# Patient Record
Sex: FEMALE | Race: WHITE | Employment: OTHER | URBAN - METROPOLITAN AREA
[De-identification: names, ages, dates, MRNs, and addresses within clinical notes are randomized per-mention and may not be internally consistent; named-entity substitution may affect disease eponyms.]

---

## 2017-05-12 ENCOUNTER — HOSPITAL ENCOUNTER (OUTPATIENT)
Dept: MAMMOGRAPHY | Age: 67
Discharge: HOME | End: 2017-05-12
Attending: FAMILY MEDICINE
Payer: MEDICARE

## 2017-05-12 DIAGNOSIS — Z12.31 VISIT FOR SCREENING MAMMOGRAM: ICD-10-CM

## 2017-05-12 PROCEDURE — 77067 SCR MAMMO BI INCL CAD: CPT

## 2017-08-04 ENCOUNTER — HOSPITAL ENCOUNTER (OUTPATIENT)
Dept: MAMMOGRAPHY | Age: 67
Discharge: HOME | End: 2017-08-04
Attending: FAMILY MEDICINE
Payer: MEDICARE

## 2017-08-04 DIAGNOSIS — Z13.820 ENCOUNTER FOR SCREENING FOR OSTEOPOROSIS: ICD-10-CM

## 2017-08-04 DIAGNOSIS — Z78.0 POST-MENOPAUSE: ICD-10-CM

## 2017-08-04 PROCEDURE — 77080 DXA BONE DENSITY AXIAL: CPT

## 2018-08-20 ENCOUNTER — HOSPITAL ENCOUNTER (OUTPATIENT)
Dept: ULTRASOUND IMAGING | Age: 68
Discharge: HOME | End: 2018-08-20
Attending: INTERNAL MEDICINE
Payer: MEDICARE

## 2018-08-20 ENCOUNTER — HOSPITAL ENCOUNTER (OUTPATIENT)
Dept: GENERAL RADIOLOGY | Age: 68
Discharge: HOME | End: 2018-08-20
Attending: INTERNAL MEDICINE
Payer: MEDICARE

## 2018-08-20 ENCOUNTER — HOSPITAL ENCOUNTER (OUTPATIENT)
Dept: NUCLEAR MEDICINE | Age: 68
End: 2018-08-20
Attending: INTERNAL MEDICINE
Payer: MEDICARE

## 2018-08-20 DIAGNOSIS — K66.0 ABDOMINAL ADHESIONS: ICD-10-CM

## 2018-08-20 DIAGNOSIS — G89.4 CHRONIC PAIN SYNDROME: ICD-10-CM

## 2018-08-20 DIAGNOSIS — K80.20 GALLSTONE: ICD-10-CM

## 2018-08-20 DIAGNOSIS — B18.2 HEP C W/O COMA, CHRONIC (HCC): ICD-10-CM

## 2018-08-20 PROCEDURE — 76705 ECHO EXAM OF ABDOMEN: CPT

## 2018-08-21 ENCOUNTER — ANESTHESIA EVENT (OUTPATIENT)
Dept: ENDOSCOPY | Age: 68
End: 2018-08-21
Payer: MEDICARE

## 2018-08-21 NOTE — PERIOP NOTES
Instructed pt that they will be notified via office/GI LAB for time of arrival to GI lab. Limited assessment complete per protocol. Follow diet and prep instructions per office. NPO after midnight. Bath or shower the night before and the am of surgery with antibacterial soap. No lotions, oils, powders, cologne on skin. No make up, eye make up or jewelry. Wear loose fitting comfortable, clean clothing . Must have adult present in building the entire time . Medications for the day of procedure : Oxycodone, Ativan    Medications to hold prior to procedure : all vitamins and supplements    Type 1a  No labs or ekg per grid. Patient verbalized understanding of all instructions and provided all medical/health information to the best of their ability.

## 2018-08-22 ENCOUNTER — HOSPITAL ENCOUNTER (OUTPATIENT)
Age: 68
Setting detail: OUTPATIENT SURGERY
Discharge: HOME | End: 2018-08-22
Attending: INTERNAL MEDICINE | Admitting: INTERNAL MEDICINE
Payer: MEDICARE

## 2018-08-22 ENCOUNTER — APPOINTMENT (OUTPATIENT)
Dept: GENERAL RADIOLOGY | Age: 68
End: 2018-08-22
Attending: INTERNAL MEDICINE
Payer: MEDICARE

## 2018-08-22 ENCOUNTER — ANESTHESIA (OUTPATIENT)
Dept: ENDOSCOPY | Age: 68
End: 2018-08-22
Payer: MEDICARE

## 2018-08-22 VITALS
OXYGEN SATURATION: 93 % | BODY MASS INDEX: 15.66 KG/M2 | TEMPERATURE: 97.4 F | DIASTOLIC BLOOD PRESSURE: 85 MMHG | RESPIRATION RATE: 17 BRPM | WEIGHT: 94 LBS | SYSTOLIC BLOOD PRESSURE: 147 MMHG | HEIGHT: 65 IN | HEART RATE: 79 BPM

## 2018-08-22 PROCEDURE — 77030012595 HC SPHNTOM BILI BSC -D: Performed by: INTERNAL MEDICINE

## 2018-08-22 PROCEDURE — 74011000250 HC RX REV CODE- 250

## 2018-08-22 PROCEDURE — 77030032490 HC SLV COMPR SCD KNE COVD -B: Performed by: INTERNAL MEDICINE

## 2018-08-22 PROCEDURE — 77030008703 HC TU ET UNCUF COVD -A: Performed by: ANESTHESIOLOGY

## 2018-08-22 PROCEDURE — C2617 STENT, NON-COR, TEM W/O DEL: HCPCS | Performed by: INTERNAL MEDICINE

## 2018-08-22 PROCEDURE — 74011000250 HC RX REV CODE- 250: Performed by: ANESTHESIOLOGY

## 2018-08-22 PROCEDURE — 76060000033 HC ANESTHESIA 1 TO 1.5 HR: Performed by: INTERNAL MEDICINE

## 2018-08-22 PROCEDURE — 77030007288 HC DEV LOK BILI BSC -A: Performed by: INTERNAL MEDICINE

## 2018-08-22 PROCEDURE — 74011250637 HC RX REV CODE- 250/637: Performed by: INTERNAL MEDICINE

## 2018-08-22 PROCEDURE — 76040000027: Performed by: INTERNAL MEDICINE

## 2018-08-22 PROCEDURE — C1769 GUIDE WIRE: HCPCS | Performed by: INTERNAL MEDICINE

## 2018-08-22 PROCEDURE — 74011250636 HC RX REV CODE- 250/636: Performed by: ANESTHESIOLOGY

## 2018-08-22 PROCEDURE — 77030008477 HC STYL SATN SLP COVD -A: Performed by: ANESTHESIOLOGY

## 2018-08-22 PROCEDURE — 77030009038 HC CATH BILI STN RTVR BSC -C: Performed by: INTERNAL MEDICINE

## 2018-08-22 PROCEDURE — 74011250636 HC RX REV CODE- 250/636

## 2018-08-22 PROCEDURE — 74330 X-RAY BILE/PANC ENDOSCOPY: CPT

## 2018-08-22 DEVICE — PANCREATIC STENT
Type: IMPLANTABLE DEVICE | Status: FUNCTIONAL
Brand: ADVANIX™ PANCREATIC STENT

## 2018-08-22 RX ORDER — LIDOCAINE HYDROCHLORIDE 20 MG/ML
INJECTION, SOLUTION EPIDURAL; INFILTRATION; INTRACAUDAL; PERINEURAL AS NEEDED
Status: DISCONTINUED | OUTPATIENT
Start: 2018-08-22 | End: 2018-08-22 | Stop reason: HOSPADM

## 2018-08-22 RX ORDER — SODIUM CHLORIDE 0.9 % (FLUSH) 0.9 %
5-10 SYRINGE (ML) INJECTION AS NEEDED
Status: DISCONTINUED | OUTPATIENT
Start: 2018-08-22 | End: 2018-08-22 | Stop reason: HOSPADM

## 2018-08-22 RX ORDER — DEXAMETHASONE SODIUM PHOSPHATE 4 MG/ML
INJECTION, SOLUTION INTRA-ARTICULAR; INTRALESIONAL; INTRAMUSCULAR; INTRAVENOUS; SOFT TISSUE AS NEEDED
Status: DISCONTINUED | OUTPATIENT
Start: 2018-08-22 | End: 2018-08-22 | Stop reason: HOSPADM

## 2018-08-22 RX ORDER — ROCURONIUM BROMIDE 10 MG/ML
INJECTION, SOLUTION INTRAVENOUS AS NEEDED
Status: DISCONTINUED | OUTPATIENT
Start: 2018-08-22 | End: 2018-08-22 | Stop reason: HOSPADM

## 2018-08-22 RX ORDER — SODIUM CHLORIDE, SODIUM LACTATE, POTASSIUM CHLORIDE, CALCIUM CHLORIDE 600; 310; 30; 20 MG/100ML; MG/100ML; MG/100ML; MG/100ML
100 INJECTION, SOLUTION INTRAVENOUS CONTINUOUS
Status: DISCONTINUED | OUTPATIENT
Start: 2018-08-22 | End: 2018-08-22 | Stop reason: HOSPADM

## 2018-08-22 RX ORDER — EPHEDRINE SULFATE 50 MG/ML
INJECTION, SOLUTION INTRAVENOUS AS NEEDED
Status: DISCONTINUED | OUTPATIENT
Start: 2018-08-22 | End: 2018-08-22 | Stop reason: HOSPADM

## 2018-08-22 RX ORDER — SODIUM CHLORIDE 9 MG/ML
10 INJECTION, SOLUTION INTRAVENOUS CONTINUOUS
Status: DISCONTINUED | OUTPATIENT
Start: 2018-08-22 | End: 2018-08-22 | Stop reason: HOSPADM

## 2018-08-22 RX ORDER — FENTANYL CITRATE 50 UG/ML
INJECTION, SOLUTION INTRAMUSCULAR; INTRAVENOUS AS NEEDED
Status: DISCONTINUED | OUTPATIENT
Start: 2018-08-22 | End: 2018-08-22 | Stop reason: HOSPADM

## 2018-08-22 RX ORDER — ONDANSETRON 2 MG/ML
INJECTION INTRAMUSCULAR; INTRAVENOUS AS NEEDED
Status: DISCONTINUED | OUTPATIENT
Start: 2018-08-22 | End: 2018-08-22 | Stop reason: HOSPADM

## 2018-08-22 RX ORDER — SUCCINYLCHOLINE CHLORIDE 20 MG/ML
INJECTION INTRAMUSCULAR; INTRAVENOUS AS NEEDED
Status: DISCONTINUED | OUTPATIENT
Start: 2018-08-22 | End: 2018-08-22 | Stop reason: HOSPADM

## 2018-08-22 RX ORDER — PROPOFOL 10 MG/ML
INJECTION, EMULSION INTRAVENOUS AS NEEDED
Status: DISCONTINUED | OUTPATIENT
Start: 2018-08-22 | End: 2018-08-22 | Stop reason: HOSPADM

## 2018-08-22 RX ADMIN — LIDOCAINE HYDROCHLORIDE 100 MG: 20 INJECTION, SOLUTION EPIDURAL; INFILTRATION; INTRACAUDAL; PERINEURAL at 12:12

## 2018-08-22 RX ADMIN — ROCURONIUM BROMIDE 5 MG: 10 INJECTION, SOLUTION INTRAVENOUS at 12:12

## 2018-08-22 RX ADMIN — DEXAMETHASONE SODIUM PHOSPHATE 10 MG: 4 INJECTION, SOLUTION INTRA-ARTICULAR; INTRALESIONAL; INTRAMUSCULAR; INTRAVENOUS; SOFT TISSUE at 12:20

## 2018-08-22 RX ADMIN — INDOMETHACIN 100 MG: 50 SUPPOSITORY RECTAL at 13:18

## 2018-08-22 RX ADMIN — SUCCINYLCHOLINE CHLORIDE 80 MG: 20 INJECTION INTRAMUSCULAR; INTRAVENOUS at 12:12

## 2018-08-22 RX ADMIN — PROPOFOL 200 MG: 10 INJECTION, EMULSION INTRAVENOUS at 12:12

## 2018-08-22 RX ADMIN — EPHEDRINE SULFATE 10 MG: 50 INJECTION, SOLUTION INTRAVENOUS at 12:44

## 2018-08-22 RX ADMIN — SODIUM CHLORIDE, SODIUM LACTATE, POTASSIUM CHLORIDE, AND CALCIUM CHLORIDE 100 ML/HR: 600; 310; 30; 20 INJECTION, SOLUTION INTRAVENOUS at 11:56

## 2018-08-22 RX ADMIN — FAMOTIDINE 20 MG: 10 INJECTION INTRAVENOUS at 11:56

## 2018-08-22 RX ADMIN — FENTANYL CITRATE 100 MCG: 50 INJECTION, SOLUTION INTRAMUSCULAR; INTRAVENOUS at 13:30

## 2018-08-22 RX ADMIN — ONDANSETRON 4 MG: 2 INJECTION INTRAMUSCULAR; INTRAVENOUS at 13:12

## 2018-08-22 NOTE — ANESTHESIA PREPROCEDURE EVALUATION
Anesthetic History   No history of anesthetic complications            Review of Systems / Medical History  Patient summary reviewed and pertinent labs reviewed    Pulmonary  Within defined limits                 Neuro/Psych   Within defined limits           Cardiovascular                Pertinent negatives: No past MI  Exercise tolerance: >4 METS     GI/Hepatic/Renal           Liver disease (elevated LFTs)     Endo/Other        Arthritis     Other Findings   Comments: Abdominal pain  Weight loss almost 30 pounds over 2 years  Migraines  cholelithiasis         Physical Exam    Airway  Mallampati: III  TM Distance: 4 - 6 cm  Neck ROM: normal range of motion   Mouth opening: Normal     Cardiovascular  Regular rate and rhythm,  S1 and S2 normal,  no murmur, click, rub, or gallop  Rhythm: regular  Rate: normal         Dental  No notable dental hx       Pulmonary  Breath sounds clear to auscultation               Abdominal  GI exam deferred       Other Findings            Anesthetic Plan    ASA: 2  Anesthesia type: general          Induction: Intravenous and RSI  Anesthetic plan and risks discussed with: Patient      Pt produced a letter from Miguel Shine MD that recommended an IV in the neck approach due to difficulty with IV access on many occasions in the past.  Ultrasound was used to guide IV placement. The EJ was attempted without success and the IJ was . 5cm below the skin due to patient's body habitus. I 1.25 inch 20G catheter was placed easily preop with active ultrasound guidance. No adverse sequelae.

## 2018-08-22 NOTE — H&P
Gastroenterology Outpatient History and Physical    Patient: Filemon Price    Physician: Modesto Maldonado MD    Chief Complaint: CBD stones    History of Present Illness: EPI pain    Justification for Procedure: As above    History:  Past Medical History:   Diagnosis Date    Abdominal pain, unspecified site     Abdominal tenderness, epigastric     Abnormal liver enzymes 2015    Acute bronchitis     Autoimmune disease ([facility] [address]     Benign neoplasm of skin, site unspecified     Fibromyalgia     Generalized anxiety disorder     Generalized hyperhidrosis     Headache     Hepatitis C     Lumbago     Migraine, unspecified, without mention of intractable migraine without mention of status migrainosus     Motion sickness     Myalgia and myositis, unspecified     Osteoarthrosis, unspecified whether generalized or localized, unspecified site     Other and unspecified noninfectious gastroenteritis and colitis(558.9)     Other and unspecified nonspecific immunological findings     Personal history of tobacco use, presenting hazards to health     Shortness of breath     Tobacco use disorder     Unspecified constipation       Past Surgical History:   Procedure Laterality Date    HX APPENDECTOMY      HX GYN      about 12 lap surgery    HX HYSTERECTOMY      complete    HX ORTHOPAEDIC Left     knee    HX TONSIL AND ADENOIDECTOMY      as a child    HX UROLOGICAL      bladder/ bladder/ bladder sling      Social History     Social History    Marital status:      Spouse name: N/A    Number of children: N/A    Years of education: N/A     Social History Main Topics    Smoking status: Current Every Day Smoker     Packs/day: 0.50     Years: 35.00     Types: Cigarettes    Smokeless tobacco: Current User    Alcohol use No    Drug use: No    Sexual activity: Not Asked     Other Topics Concern    None     Social History Narrative      Family History   Problem Relation Age of Onset    Arthritis-osteo Mother     Cancer Mother      breast    Heart Disease Mother     Hypertension Mother     Breast Cancer Mother 80       Allergies: Allergies   Allergen Reactions    Biaxin [Clarithromycin] Unknown (comments)    Gluten Diarrhea    Lactose Diarrhea       Medications:   Prior to Admission medications    Medication Sig Start Date End Date Taking? Authorizing Provider   oxyCODONE IR (ROXICODONE) 10 mg tab immediate release tablet Take 1 Tab by mouth four (4) times daily for 5 days. Max Daily Amount: 40 mg. 2of2  Indications: Pain 9/4/18 9/9/18 Yes Vikram Walden MD   fentaNYL (DURAGESIC) 75 mcg/hr 1 Patch by TransDERmal route every seventy-two (72) hours for 30 days. Max Daily Amount: 1 Patch. Indications: Chronic Pain with Opioid Tolerance 8/10/18 9/9/18 Yes Vikram Walden MD   LORazepam (ATIVAN) 1 mg tablet TAKE 1 TABLET BY MOUTH FOUR TIMES DAILY AS NEEDED FOR ANXIETY- MAXIMUM DAILY DOSE IS 4 TABLETS 7/11/18  Yes Vikram Walden MD   ascorbic acid, vitamin C, (VITAMIN C) 500 mg tablet Take  by mouth. Yes Historical Provider   CHOLECALCIFEROL, VITAMIN D3, (VITAMIN D3 PO) Take  by mouth. Yes Historical Provider   oxyCODONE IR (ROXICODONE) 10 mg tab immediate release tablet Take 1 Tab by mouth four (4) times daily for 25 days. Max Daily Amount: 40 mg. 1of2  Indications: Pain 8/10/18 9/4/18  Vikram Walden MD   naloxone 2 mg/actuation spry Use 1 spray intranasally into 1 nostril. Use a new Narcan nasal spray for subsequent doses and administer into alternating nostrils. May repeat every 2 to 3 minutes as needed. 3/12/18   Vikram Walden MD   SUMAtriptan (IMITREX) 50 mg tablet Take 1 Tab by mouth once as needed for Migraine for up to 1 dose. Indications: MIGRAINE 7/26/17   Vikram Walden MD       Vital Signs: Blood pressure 150/82, pulse 79, temperature 98 °F (36.7 °C), resp. rate 18, height 5' 5\" (1.651 m), weight 42.6 kg (94 lb), SpO2 98 %.     Physical Exam:   General: alert      Heart: regular rate and rhythm   Lungs: no tachypnea, retractions or cyanosis, Heart exam - S1, S2 normal, no murmur, no gallop, rate regular   Abdominal: Bowel sounds are normal, soft, non distended             Plan of Care/Planned Procedure: ERCP    Signed:  Julita Madrigal MD 8/22/2018

## 2018-08-22 NOTE — ANESTHESIA POSTPROCEDURE EVALUATION
Post-Anesthesia Evaluation and Assessment    Patient: Kaylee Mohan MRN: [id number]  SSN: [id number]    YOB: 1950  Age: 80 y.o. Sex: female       Cardiovascular Function/Vital Signs  Visit Vitals    /85 (BP 1 Location: Right arm, BP Patient Position: At rest)    Pulse 69    Temp 36.3 °C (97.4 °F)    Resp 21    Ht 5' 5\" (1.651 m)    Wt 42.6 kg (94 lb)    SpO2 95%    BMI 15.64 kg/m2       Patient is status post general anesthesia for Procedure(s):  ENDOSCOPIC RETROGRADE CHOLANGIOPANCREATOGRAPHY (ERCP)/ 16  ENDOSCOPIC SPHINCTEROTOMY  BILIARY STENT PLACEMENT  ENDOSCOPIC STONE EXTRACTION/BALLOON SWEEP. Nausea/Vomiting: None    Postoperative hydration reviewed and adequate. Pain:  Pain Scale 1: Numeric (0 - 10) (08/22/18 1336)  Pain Intensity 1: 0 (08/22/18 1336)   Managed    Neurological Status:   Neuro (WDL): Exceptions to WDL (08/22/18 1336)  Neuro  Neurologic State: Alert (08/22/18 1336)  Orientation Level: Oriented X4 (08/22/18 1336)  Cognition: Appropriate decision making; Appropriate for age attention/concentration; Appropriate safety awareness; Follows commands (08/22/18 1336)  Speech: Clear (08/22/18 1336)  LUE Motor Response: Purposeful (08/22/18 1336)  LLE Motor Response: Purposeful (08/22/18 1336)  RUE Motor Response: Purposeful (08/22/18 1336)  RLE Motor Response: Purposeful (08/22/18 1336)   At baseline    Mental Status and Level of Consciousness: Arousable    Pulmonary Status:   O2 Device: Room air (08/22/18 1402)   Adequate oxygenation and airway patent    Complications related to anesthesia: None    Post-anesthesia assessment completed. No concerns. Right IJ removed and pressure held. No bleeding/sterile dressing placed. Pt discharged to home.     Signed By: Olga Rivero MD     August 22, 2018

## 2018-08-22 NOTE — DISCHARGE INSTRUCTIONS
Endoscopic Retrograde Cholangiopancreatogram (ERCP): What to Expect at [address] [facility]  After you have an endoscopic retrograde cholangiopancreatogram (ERCP). You will be able to go home after your doctor or a nurse checks to make sure you are not having any problems. If you stay in the hospital overnight, you may go home the next day. You may have a sore throat for a day or two after the procedure. This care sheet gives you a general idea about how long it will take for you to recover. But each person recovers at a different pace. Follow the steps below to get better as quickly as possible. How can you care for yourself at home? Activity  1. Rest as much as you need to after you go home. 2. You should be able to go back to your usual activities the day after the procedure. Diet  · Follow your doctor's directions for eating after the procedure. · Drink plenty of fluids (unless your doctor tells you not to). Medicines  · If you have a sore throat the next day, use an over-the-counter spray to numb your throat. Follow-up care is a key part of your treatment and safety. Be sure to make and go to all appointments, and call your doctor if you are having problems. Its also a good idea to know your test results and keep a list of the medicines you take. When should you call for help? Call 911 anytime you think you may need emergency care. For example, call if:  · You vomit blood or what looks like coffee grounds. · You pass maroon or bloody stools. Call your doctor now or go to the emergency room if:  · You have trouble swallowing. · You have belly pain. · Your stools are black and tarlike or have streaks of blood. · You are sick to your stomach and cannot drink fluids. · You have a fever. · You have pain that does not get better after you take your pain medicine. Watch closely for changes in your health, and be sure to contact your doctor if:  · Your throat still hurts after a day or two.   You do not get better as expected. After general anesthesia or intravenous sedation, for 24 hours or while taking prescription Narcotics:  · Limit your activities  · Do not drive and operate hazardous machinery  · Do not make important personal or business decisions  · Do  not drink alcoholic beverages  · If you have not urinated within 8 hours after discharge, please contact your surgeon on call. *  Please give a list of your current medications to your Primary Care Provider. *  Please update this list whenever your medications are discontinued, doses are      changed, or new medications (including over-the-counter products) are added. *  Please carry medication information at all times in case of emergency situations. These are general instructions for a healthy lifestyle:    No smoking/ No tobacco products/ Avoid exposure to second hand smoke    Surgeon General's Warning:  Quitting smoking now greatly reduces serious risk to your health. Obesity, smoking, and sedentary lifestyle greatly increases your risk for illness    A healthy diet, regular physical exercise & weight monitoring are important for maintaining a healthy lifestyle    You may be retaining fluid if you have a history of heart failure or if you experience any of the following symptoms:  Weight gain of 3 pounds or more overnight or 5 pounds in a week, increased swelling in our hands or feet or shortness of breath while lying flat in bed. Please call your doctor as soon as you notice any of these symptoms; do not wait until your next office visit. Recognize signs and symptoms of STROKE:    F-face looks uneven    A-arms unable to move or move unevenly    S-speech slurred or non-existent    T-time-call 911 as soon as signs and symptoms begin-DO NOT go       Back to bed or wait to see if you get better-TIME IS BRAIN.

## 2018-08-22 NOTE — IP AVS SNAPSHOT
[facility] 
 
 
 [address] [place] [address] 
[phone] Patient: Yang Leblanc MRN: [id number] About your hospitalization You were admitted on:  August 22, 2018 You last received care in the:  [facility] PACU You were discharged on:  August 22, 2018 Why you were hospitalized Your primary diagnosis was:  Not on File Follow-up Information Follow up With Details Comments Contact Info MD DR IZZY Venegas [facility] OFFICE WILL CALL WITH FOLLOW-UP APPOINTMENT. [address] [place] [address] [phone] [facility] [address] 
[phone] Your Scheduled Appointments Monday October 08, 2018  1:50 PM EDT Office Visit with Timmy Perez MD  
[address] ([address] [place] [address]) [address] [address]  
[phone] Discharge Orders None A check farhan indicates which time of day the medication should be taken. My Medications ASK your doctor about these medications Instructions Each Dose to Equal  
 Morning Noon Evening Bedtime  
 ascorbic acid (vitamin C) 500 mg tablet Commonly known as:  VITAMIN C Your last dose was: Your next dose is: Take  by mouth. fentaNYL 75 mcg/hr Commonly known as:  Cori Parrot Your last dose was: Your next dose is:    
   
   
 1 Patch by TransDERmal route every seventy-two (72) hours for 30 days. Max Daily Amount: 1 Patch. Indications: Chronic Pain with Opioid Tolerance 1 Patch LORazepam 1 mg tablet Commonly known as:  ATIVAN Your last dose was: Your next dose is: TAKE 1 TABLET BY MOUTH FOUR TIMES DAILY AS NEEDED FOR ANXIETY- MAXIMUM DAILY DOSE IS 4 TABLETS  
     
   
   
   
  
 naloxone 2 mg/actuation Spry Your last dose was: Your next dose is: Use 1 spray intranasally into 1 nostril. Use a new Narcan nasal spray for subsequent doses and administer into alternating nostrils. May repeat every 2 to 3 minutes as needed. * oxyCODONE IR 10 mg Tab immediate release tablet Commonly known as:  Jung Cortez Your last dose was: Your next dose is: Take 1 Tab by mouth four (4) times daily for 25 days. Max Daily Amount: 40 mg. 1of2  Indications: Pain 10 mg  
    
   
   
   
  
 * oxyCODONE IR 10 mg Tab immediate release tablet Commonly known as:  Dominicafricachristopher Cortez Start taking on:  9/4/2018 Your last dose was: Your next dose is: Take 1 Tab by mouth four (4) times daily for 5 days. Max Daily Amount: 40 mg. 2of2  Indications: Pain 10 mg  
    
   
   
   
  
 SUMAtriptan 50 mg tablet Commonly known as:  IMITREX Your last dose was: Your next dose is: Take 1 Tab by mouth once as needed for Migraine for up to 1 dose. Indications: MIGRAINE 50 mg  
    
   
   
   
  
 VITAMIN D3 PO Your last dose was: Your next dose is: Take  by mouth. * Notice: This list has 2 medication(s) that are the same as other medications prescribed for you. Read the directions carefully, and ask your doctor or other care provider to review them with you. Opioid Education Prescription Opioids: What You Need to Know: 
 
Prescription opioids can be used to help relieve moderate-to-severe pain and are often prescribed following a surgery or injury, or for certain health conditions. These medications can be an important part of treatment but also come with serious risks. Opioids are strong pain medicines. Examples include hydrocodone, oxycodone, fentanyl, and morphine. Heroin is an example of an illegal opioid.   It is important to work with your health care provider to make sure you are getting the safest, most effective care. WHAT ARE THE RISKS AND SIDE EFFECTS OF OPIOID USE? Prescription opioids carry serious risks of addiction and overdose, especially with prolonged use. An opioid overdose, often marked by slow breathing, can cause sudden death. The use of prescription opioids can have a number of side effects as well, even when taken as directed. · Tolerance-meaning you might need to take more of a medication for the same pain relief · Physical dependence-meaning you have symptoms of withdrawal when the medication is stopped. Withdrawal symptoms can include nausea, sweating, chills, diarrhea, stomach cramps, and muscle aches. Withdrawal can last up to several weeks, depending on which drug you took and how long you took it. · Increased sensitivity to pain · Constipation · Nausea, vomiting, and dry mouth · Sleepiness and dizziness · Confusion · Depression · Low levels of testosterone that can result in lower sex drive, energy, and strength · Itching and sweating RISKS ARE GREATER WITH:      
· History of drug misuse, substance use disorder, or overdose · Mental health conditions (such as depression or anxiety) · Sleep apnea · Older age (72 years or older) · Pregnancy Avoid alcohol while taking prescription opioids. Also, unless specifically advised by your health care provider, medications to avoid include: · Benzodiazepines (such as Xanax or Valium) · Muscle relaxants (such as Soma or Flexeril) · Hypnotics (such as Ambien or Lunesta) · Other prescription opioids KNOW YOUR OPTIONS Talk to your health care provider about ways to manage your pain that don't involve prescription opioids. Some of these options may actually work better and have fewer risks and side effects. Options may include: 
· Pain relievers such as acetaminophen, ibuprofen, and naproxen · Some medications that are also used for depression or seizures · Physical therapy and exercise · Counseling to help patients learn how to cope better with triggers of pain and stress. · Application of heat or cold compress · Massage therapy · Relaxation techniques Be Informed Make sure you know the name of your medication, how much and how often to take it, and its potential risks & side effects. IF YOU ARE PRESCRIBED OPIOIDS FOR PAIN: 
· Never take opioids in greater amounts or more often than prescribed. Remember the goal is not to be pain-free but to manage your pain at a tolerable level. · Follow up with your primary care provider to: · Work together to create a plan on how to manage your pain. · Talk about ways to help manage your pain that don't involve prescription opioids. · Talk about any and all concerns and side effects. · Help prevent misuse and abuse. · Never sell or share prescription opioids · Help prevent misuse and abuse. · Store prescription opioids in a secure place and out of reach of others (this may include visitors, children, friends, and family). · Safely dispose of unused/unwanted prescription opioids: Find your community drug take-back program or your pharmacy mail-back program, or flush them down the toilet, following guidance from the Food and Drug Administration (www.fda.gov/Drugs/ResourcesForYou). · Visit www.cdc.gov/drugoverdose to learn about the risks of opioid abuse and overdose. · If you believe you may be struggling with addiction, tell your health care provider and ask for guidance or call [address] [place] [address] at 7-254-902-PGSQ. Discharge Instructions Endoscopic Retrograde Cholangiopancreatogram (ERCP): What to Expect at [facility] Your Recovery After you have an endoscopic retrograde cholangiopancreatogram (ERCP). You will be able to go home after your doctor or a nurse checks to make sure you are not having any problems.  If you stay in the hospital overnight, you may go home the next day. You may have a sore throat for a day or two after the procedure. This care sheet gives you a general idea about how long it will take for you to recover. But each person recovers at a different pace. Follow the steps below to get better as quickly as possible. How can you care for yourself at home? Activity 1. Rest as much as you need to after you go home. 2. You should be able to go back to your usual activities the day after the procedure. Diet · Follow your doctor's directions for eating after the procedure. · Drink plenty of fluids (unless your doctor tells you not to). Medicines · If you have a sore throat the next day, use an over-the-counter spray to numb your throat. Follow-up care is a key part of your treatment and safety. Be sure to make and go to all appointments, and call your doctor if you are having problems. Its also a good idea to know your test results and keep a list of the medicines you take. When should you call for help? Call 911 anytime you think you may need emergency care. For example, call if: 
· You vomit blood or what looks like coffee grounds. · You pass maroon or bloody stools. Call your doctor now or go to the emergency room if: 
· You have trouble swallowing. · You have belly pain. · Your stools are black and tarlike or have streaks of blood. · You are sick to your stomach and cannot drink fluids. · You have a fever. · You have pain that does not get better after you take your pain medicine. Watch closely for changes in your health, and be sure to contact your doctor if: 
· Your throat still hurts after a day or two. You do not get better as expected. After general anesthesia or intravenous sedation, for 24 hours or while taking prescription Narcotics: · Limit your activities · Do not drive and operate hazardous machinery · Do not make important personal or business decisions · Do  not drink alcoholic beverages · If you have not urinated within 8 hours after discharge, please contact your surgeon on call. *  Please give a list of your current medications to your Primary Care Provider. *  Please update this list whenever your medications are discontinued, doses are 
    changed, or new medications (including over-the-counter products) are added. *  Please carry medication information at all times in case of emergency situations. These are general instructions for a healthy lifestyle: No smoking/ No tobacco products/ Avoid exposure to second hand smoke Surgeon General's Warning:  Quitting smoking now greatly reduces serious risk to your health. Obesity, smoking, and sedentary lifestyle greatly increases your risk for illness A healthy diet, regular physical exercise & weight monitoring are important for maintaining a healthy lifestyle You may be retaining fluid if you have a history of heart failure or if you experience any of the following symptoms:  Weight gain of 3 pounds or more overnight or 5 pounds in a week, increased swelling in our hands or feet or shortness of breath while lying flat in bed. Please call your doctor as soon as you notice any of these symptoms; do not wait until your next office visit. Recognize signs and symptoms of STROKE: 
 
F-face looks uneven A-arms unable to move or move unevenly S-speech slurred or non-existent T-time-call 911 as soon as signs and symptoms begin-DO NOT go Back to bed or wait to see if you get better-TIME IS BRAIN. ACO Transitions of Care Introducing [employer] [address]y offers a voluntary care coordination program to provide high quality service and care to [facility] fee-for-service beneficiaries. Mauricio Corona was designed to help you enhance your health and well-being through the following services: ? Transitions of Care  support for individuals who are transitioning from one care setting to another (example: Hospital to home). ? Chronic and Complex Care Coordination  support for individuals and caregivers of those with serious or chronic illnesses or with more than one chronic (ongoing) condition and those who take a number of different medications. If you meet specific medical criteria, a [facility] [address] may call you directly to coordinate your care with your primary care physician and your other care providers. For questions about the [facility] programs, please, contact your physicians office. For general questions or additional information about Accountable Care Organizations: 
Please visit www.medicare.gov/acos. html or call 1-800-MEDICARE (8-911.361.5572) TTY users should call 1-540.689.7777. Introducing [facility]! Dear Cruzito Jin: 
Thank you for requesting a [employer] account. Our records indicate that you already have an active Wudya account. You can access your account anytime at [email or web address] Did you know that you can access your hospital and ER discharge instructions at any time in [employer]ud[employer]? You can also review all of your test results from your hospital stay or ER visit. Additional Information If you have questions, please visit the Frequently Asked Questions section of the [employer] website at [email or web address] Remember, W[employer]ya is NOT to be used for urgent needs. For medical emergencies, dial 911. Now available from your iPhone and Android! Introducing Kavon Fernandez As a [facility] patient, I wanted to make you aware of our electronic visit tool called Kavon Fernandez. [facility] [address] 24/7 allows you to connect within minutes with a medical provider 24 hours a day, seven days a week via a mobile device or tablet or logging into a secure website from your computer. You can access [employer] from anywhere in the [place]. A virtual visit might be right for you when you have a simple condition and feel like you just dont want to get out of bed, or cant get away from work for an appointment, when your regular [employer] provider is not available (evenings, weekends or holidays), or when youre out of town and need minor care. Electronic visits cost only $49 and if the [employer] 24/7 provider determines a prescription is needed to treat your condition, one can be electronically transmitted to a nearby pharmacy*. Please take a moment to enroll today if you have not already done so. The enrollment process is free and takes just a few minutes. To enroll, please download the [employer] 24/7 dagoberto to your tablet or phone, or visit [email or web address] to enroll on your computer. And, as an [address] [place] [address] patient with a [employer] account, the results of your visits will be scanned into your electronic medical record and your primary care provider will be able to view the scanned results. We urge you to continue to see your regular [employer] provider for your ongoing medical care. And while your primary care provider may not be the one available when you seek a [employer] virtual visit, the peace of mind you get from getting a real diagnosis real time can be priceless. For more information on [employer], view our Frequently Asked Questions (FAQs) at [email or web address]. Sincerely, 
 
Livia Henning MD 
Chief Medical Officer [employer] *:  certain medications cannot be prescribed via [employer] Unresulted Labs-Please follow up with your PCP about these lab tests Order Current Status XR ERCP / ERCB COMBINED In process Providers Seen During Your Hospitalization Provider Specialty Primary office phone Tawanna Barthel, MD Gastroenterology [phone] Your Primary Care Physician (PCP) Primary Care Physician Office Phone Office Fax [address] [phone] You are allergic to the following Allergen Reactions Biaxin (Clarithromycin) Unknown (comments) Gluten Diarrhea Lactose Diarrhea Recent Documentation Height Weight BMI OB Status Smoking Status 1.651 m 42.6 kg 15.64 kg/m2 Hysterectomy Current Every Day Smoker Emergency Contacts Name Discharge Info Relation Home Work Mobile [address] CAREGIVER [3] Spouse [3] [phone] Patient Belongings The following personal items are in your possession at time of discharge: 
  Dental Appliances: None  Visual Aid: None Please provide this summary of care documentation to your next provider. Signatures-by signing, you are acknowledging that this After Visit Summary has been reviewed with you and you have received a copy. Patient Signature:  ____________________________________________________________ Date:  ____________________________________________________________  
  
Luther Lawler Provider Signature:  ____________________________________________________________ Date:  ____________________________________________________________

## 2018-08-22 NOTE — PROCEDURES
ERCP: Endoscopic Retrograde Cholangiopancreatogram    DATE of PROCEDURE: 8/22/2018    INDICATION:  Choledocholithiasis  Elevated LFTs  Epigastric pain  Nausea    POSTPROCEDURE DIAGNOSIS:  Choledocholithiasis  Prophylactic pancreatic duct stement  Moderate retained food in the stomach-possible  gastroparesis    MEDICATIONS ADMINISTERED:    1. General anesthesia    INSTRUMENT: [id number]    PROCEDURE:  After obtaining informed consent, the patient was placed in prone position on the fluoroscopy table. The patient was then sedated. The endoscope was passed under indirect technique to the oropharynx and gently passed into the duodenum. Only partial views of the stomach and duodenum were obtained. After the procedure, the patient was taken to the recovery area in stable condition. FINDINGS:  STOMACH: moderate amount of retained food in the gastric fundus and body, suggesting gastroparesis.  confirms oxycodone use. AMPULLA: Normal, small  BILE DUCT: cannulated with some difficulty using a 44 sphincterotome over a 0.035 wire. OThe wire repeatedly and went in a pancreatic trajectory despite multiple attempts at repositioning. The wire was left in PT and a second wire was advanced through the tome but also went into the pancreatic duct. Then a 5 English 5 cm plastic temporary single pigtail stent was placed into the pancreatic duct with fluoroscopic guidance. This allowed prompt cannulation of the bile duct. Wire advanced into intrahepatic ducts under fluoroscopic guidance, and a cholangiogram was obtained. This revealed multiple mobile filling defects consistent with stones. The CBD was mildly dilated, 10-11mm. partial filling of the the cystic duct, intentionally underfilled. An 8mm biliary sphincterotomy was performed over a guidewire without complications. The tome was exchanged for a 9/12mm extraction balloon. Multiple CBD stones were removed from the duct. Repeat occlusion cholangiogram was clear. PANCREATIC DUCT: Limited Pancreatogram was Normal in the head. Body and tail were not filled. Rectal Indomethicin Given: Yes, 100 mg    ASSESSMENT/PLAN:  1. Clears x 2 hrs, then low fat diet for 24 hrs  2. Avoid ASA/NSAIDs x 2 weeks after biliary sphincterotomy  3.  Surgical referral for cholecystectomy  EGD in 1-4 weeks for PD stent removal      Johnathon Lindsay MD  [facility] [place]

## 2018-09-13 PROBLEM — K80.50 CHOLEDOCHOLITHIASIS: Status: ACTIVE | Noted: 2018-09-13

## 2018-09-13 PROBLEM — R10.11 RUQ PAIN: Status: ACTIVE | Noted: 2018-09-13

## 2018-09-13 PROBLEM — K80.10 CCC (CHRONIC CALCULOUS CHOLECYSTITIS): Status: ACTIVE | Noted: 2018-09-13

## 2018-09-23 ENCOUNTER — ANESTHESIA EVENT (OUTPATIENT)
Dept: ENDOSCOPY | Age: 68
End: 2018-09-23
Payer: MEDICARE

## 2018-09-23 RX ORDER — SODIUM CHLORIDE 0.9 % (FLUSH) 0.9 %
5-10 SYRINGE (ML) INJECTION AS NEEDED
Status: CANCELLED | OUTPATIENT
Start: 2018-09-23

## 2018-09-23 RX ORDER — SODIUM CHLORIDE, SODIUM LACTATE, POTASSIUM CHLORIDE, CALCIUM CHLORIDE 600; 310; 30; 20 MG/100ML; MG/100ML; MG/100ML; MG/100ML
100 INJECTION, SOLUTION INTRAVENOUS CONTINUOUS
Status: CANCELLED | OUTPATIENT
Start: 2018-09-23

## 2018-09-24 ENCOUNTER — HOSPITAL ENCOUNTER (OUTPATIENT)
Age: 68
Setting detail: OUTPATIENT SURGERY
Discharge: HOME | End: 2018-09-24
Attending: INTERNAL MEDICINE | Admitting: INTERNAL MEDICINE
Payer: MEDICARE

## 2018-09-24 ENCOUNTER — ANESTHESIA (OUTPATIENT)
Dept: ENDOSCOPY | Age: 68
End: 2018-09-24
Payer: MEDICARE

## 2018-09-24 VITALS
SYSTOLIC BLOOD PRESSURE: 126 MMHG | WEIGHT: 95 LBS | HEIGHT: 66 IN | RESPIRATION RATE: 14 BRPM | TEMPERATURE: 97.8 F | OXYGEN SATURATION: 96 % | HEART RATE: 71 BPM | BODY MASS INDEX: 15.27 KG/M2 | DIASTOLIC BLOOD PRESSURE: 79 MMHG

## 2018-09-24 PROCEDURE — 76060000031 HC ANESTHESIA FIRST 0.5 HR: Performed by: INTERNAL MEDICINE

## 2018-09-24 PROCEDURE — 74011250636 HC RX REV CODE- 250/636

## 2018-09-24 PROCEDURE — 74011250636 HC RX REV CODE- 250/636: Performed by: ANESTHESIOLOGY

## 2018-09-24 PROCEDURE — 76040000025: Performed by: INTERNAL MEDICINE

## 2018-09-24 RX ORDER — PROPOFOL 10 MG/ML
INJECTION, EMULSION INTRAVENOUS AS NEEDED
Status: DISCONTINUED | OUTPATIENT
Start: 2018-09-24 | End: 2018-09-24 | Stop reason: HOSPADM

## 2018-09-24 RX ORDER — PROPOFOL 10 MG/ML
INJECTION, EMULSION INTRAVENOUS
Status: DISCONTINUED | OUTPATIENT
Start: 2018-09-24 | End: 2018-09-24 | Stop reason: HOSPADM

## 2018-09-24 RX ORDER — SODIUM CHLORIDE, SODIUM LACTATE, POTASSIUM CHLORIDE, CALCIUM CHLORIDE 600; 310; 30; 20 MG/100ML; MG/100ML; MG/100ML; MG/100ML
100 INJECTION, SOLUTION INTRAVENOUS CONTINUOUS
Status: DISCONTINUED | OUTPATIENT
Start: 2018-09-24 | End: 2018-09-24 | Stop reason: HOSPADM

## 2018-09-24 RX ADMIN — PROPOFOL 30 MG: 10 INJECTION, EMULSION INTRAVENOUS at 10:50

## 2018-09-24 RX ADMIN — PROPOFOL 30 MG: 10 INJECTION, EMULSION INTRAVENOUS at 10:53

## 2018-09-24 RX ADMIN — PROPOFOL 20 MG: 10 INJECTION, EMULSION INTRAVENOUS at 10:55

## 2018-09-24 RX ADMIN — SODIUM CHLORIDE, SODIUM LACTATE, POTASSIUM CHLORIDE, AND CALCIUM CHLORIDE 100 ML/HR: 600; 310; 30; 20 INJECTION, SOLUTION INTRAVENOUS at 10:39

## 2018-09-24 RX ADMIN — PROPOFOL 20 MG: 10 INJECTION, EMULSION INTRAVENOUS at 10:52

## 2018-09-24 RX ADMIN — PROPOFOL 140 MCG/KG/MIN: 10 INJECTION, EMULSION INTRAVENOUS at 10:50

## 2018-09-24 NOTE — ANESTHESIA POSTPROCEDURE EVALUATION
Post-Anesthesia Evaluation and Assessment Patient: Jayme Swift MRN: [id number]  SSN: [id number] YOB: 1950  Age: 80 y.o. Sex: female Cardiovascular Function/Vital Signs Visit Vitals  /60  Pulse 70  Temp 36.6 °C (97.8 °F)  Resp 16  
 Ht 5' 5.5\" (1.664 m)  Wt 43.1 kg (95 lb)  SpO2 97%  BMI 15.57 kg/m2 Patient is status post general anesthesia for Procedure(s): 
egd. Nausea/Vomiting: None Postoperative hydration reviewed and adequate. Pain: 
Pain Scale 1: Numeric (0 - 10) (09/24/18 0956) Pain Intensity 1: 6 (09/24/18 0956) Managed Neurological Status: At baseline Mental Status and Level of Consciousness: Alert and oriented Pulmonary Status:  
O2 Device: Nasal cannula (09/24/18 1111) Adequate oxygenation and airway patent Complications related to anesthesia: None Post-anesthesia assessment completed. No concerns Signed By: Stafford Holter, MD   
 September 24, 2018

## 2018-09-24 NOTE — PROGRESS NOTES
Iv pulled and pressure held for 5 minutes. Placed 2 2x2's gauze and tegaderm. No hematoma and no bleeding noted. Educated pt on holding pressure if site starts to bleed and call the doctor.

## 2018-09-24 NOTE — ANESTHESIA PREPROCEDURE EVALUATION
Anesthetic History No history of anesthetic complications Review of Systems / Medical History Patient summary reviewed and pertinent labs reviewed Pulmonary Within defined limits Neuro/Psych Within defined limits Cardiovascular Pertinent negatives: No past MI Exercise tolerance: >4 METS 
  
GI/Hepatic/Renal 
  
 
 
 
Liver disease (elevated LFTs) Endo/Other Arthritis Other Findings Comments: Abdominal pain Weight loss almost 30 pounds over 2 years Migraines 
cholelithiasis Physical Exam 
 
Airway Mallampati: III 
TM Distance: 4 - 6 cm Neck ROM: normal range of motion Mouth opening: Normal 
 
 Cardiovascular Regular rate and rhythm,  S1 and S2 normal,  no murmur, click, rub, or gallop Rhythm: regular Rate: normal 
 
 
 
 Dental 
No notable dental hx Pulmonary Breath sounds clear to auscultation Abdominal 
GI exam deferred Other Findings Anesthetic Plan ASA: 2 Anesthesia type: total IV anesthesia Induction: Intravenous and RSI Anesthetic plan and risks discussed with: Patient and Spouse Pt has a letter from Pernell Short MD that recommended an IV in the neck approach due to difficulty with IV access on many occasions in the past.  She had some superficial veins in her neck and I attempted twice with a 22g IV, but I was unsuccessful. I then used ultrasound to scan her neck and guide IV placement. Both her right and left IJ veins were 0.5cm below the skin due to patient's body habitus. Because I had already prepped her left neck with chlorprep, I cannulated the left IJ with a 4.8 cm 20G PIV catheter with a single attempt with active ultrasound guidance. No apparent complications.

## 2018-09-24 NOTE — DISCHARGE INSTRUCTIONS
Gastrointestinal Esophagogastroduodenoscopy (EGD) - Upper Exam Discharge Instructions    1. Call Dr. Rajendra Sosa at [phone] for any problems or questions. 2. Contact the doctor's office for follow up appointment as directed. 3. Medication may cause drowsiness for several hours, therefore, do not drive or  operate machinery for remainder of the day. 4. No alcohol today. 5. Ordinarily, you may resume regular diet and activity after exam unless otherwise  specified by your physician. 6. For mild soreness in your throat you may use Cepacol throat lozenges or warm salt-water gargles as needed. Any additional instructions:Recommendations:  Low residue diet  Increase fluid intake  Decrease narcotics- likely cause of gastroparesis  Proceed with laparoscopic Cholecystectomy as scheduled  Colon cancer screening, If not up-to-date       Instructions given to Hayde Peñaloza and other family members.   Instructions given by:  Dr. Rajendra Sosa

## 2018-09-24 NOTE — PROCEDURES
EGD Procedure Note    PreOp Diagnosis:   Foreign body removal  Pancreatic duct stent    PostOp Diagnosis:  Normal ampulla- without retained stent  Probable gastroparesis    Medications:  Monitored Anesthesia    Procedure:  EGD   Instrument:   After informed consent was obtained, the patient was sedated and the endoscope was inserted  in the mouth and advanced into the duodenum without difficulty. The scope was slowly withdrawn while the mucosa was carefully inspected, including a retroflexed view of the cardia and fundus. Findings:   Esophagus: The proximal and mid esophagus appeared normal.  In the distal esophagus, The Z line was normal  Stomach: Normal, without ulcers, erosions, or polyps. Large amount of retained solid food Filling most of the fundus, body, antrum. No gastric outlet obstruction  Duodenum:   Normal.  Some retained food.   Views of the ampulla with a side-viewing endoscope appear normal    Recommendations:  Low residue diet  Increase fluid intake  Decrease narcotics- likely cause of gastroparesis  Proceed with laparoscopic Cholecystectomy as scheduled  Colon cancer screening, If not up-to-date    Tayo Gongora MD

## 2018-09-24 NOTE — H&P
Gastroenterology Outpatient History and Physical 
 
Patient: Filemon Price Physician: Modesto Maldonado MD 
 
Chief Complaint: Pancreatic duct stent History of Present Illness: Foreign body removal 
 
Justification for Procedure: As above History: 
Past Medical History:  
Diagnosis Date  Abdominal pain, unspecified site  Abdominal tenderness, epigastric  Abnormal liver enzymes 2015  Acute bronchitis  Autoimmune disease ([facility] [address]  Benign neoplasm of skin, site unspecified  Fibromyalgia  Generalized anxiety disorder  Generalized hyperhidrosis  Headache  Hepatitis C   
 Lumbago  Migraine, unspecified, without mention of intractable migraine without mention of status migrainosus  Motion sickness  Myalgia and myositis, unspecified  Osteoarthrosis, unspecified whether generalized or localized, unspecified site  Other and unspecified noninfectious gastroenteritis and colitis(558.9)  Other and unspecified nonspecific immunological findings  Personal history of tobacco use, presenting hazards to health  Shortness of breath  Tobacco use disorder  Unspecified constipation Past Surgical History:  
Procedure Laterality Date  HX APPENDECTOMY  HX GYN    
 about 12 lap surgery  HX HYSTERECTOMY complete  HX ORTHOPAEDIC Left   
 knee  HX TONSIL AND ADENOIDECTOMY    
 as a child  HX UROLOGICAL    
 bladder/ bladder/ bladder sling Social History Social History  Marital status:  Spouse name: N/A  
 Number of children: N/A  
 Years of education: N/A Social History Main Topics  Smoking status: Current Every Day Smoker Packs/day: 0.50 Years: 35.00 Types: Cigarettes  Smokeless tobacco: Never Used  Alcohol use No  
 Drug use: No  
 Sexual activity: Not Asked Other Topics Concern  None Social History Narrative Family History Problem Relation Age of Onset David Coronel Arthritis-osteo Mother  Cancer Mother   
  breast  
 Heart Disease Mother  Hypertension Mother  Breast Cancer Mother 80 Allergies: Allergies Allergen Reactions  Biaxin [Clarithromycin] Unknown (comments)  Gluten Diarrhea  Lactose Diarrhea Medications:  
Prior to Admission medications Medication Sig Start Date End Date Taking? Authorizing Provider  
oxyCODONE IR (ROXICODONE) 10 mg tab immediate release tablet Take 1 Tab by mouth four (4) times daily for 5 days. Max Daily Amount: 40 mg. 2of2  Indications: Pain 10/2/18 10/7/18 Yes Be Madrigal MD  
fentaNYL (DURAGESIC) 75 mcg/hr 1 Patch by TransDERmal route every seventy-two (72) hours for 30 days. Max Daily Amount: 1 Patch. Indications: Chronic Pain with Opioid Tolerance 9/7/18 10/7/18 Yes Be Madrigal MD  
LORazepam (ATIVAN) 1 mg tablet TAKE 1 TABLET BY MOUTH FOUR TIMES DAILY AS NEEDED FOR ANXIETY- MAXIMUM DAILY DOSE IS 4 TABLETS 7/11/18  Yes Be Madrigal MD  
SUMAtriptan (IMITREX) 50 mg tablet Take 1 Tab by mouth once as needed for Migraine for up to 1 dose. Indications: MIGRAINE 7/26/17  Yes Be Madrigal MD  
oxyCODONE IR (ROXICODONE) 10 mg tab immediate release tablet Take 1 Tab by mouth four (4) times daily for 25 days. Max Daily Amount: 40 mg. 1of2  Indications: Pain 9/7/18 10/2/18  Be Madrigal MD  
naloxone 2 mg/actuation spry Use 1 spray intranasally into 1 nostril. Use a new Narcan nasal spray for subsequent doses and administer into alternating nostrils. May repeat every 2 to 3 minutes as needed. 3/12/18   Be Madrigal MD  
ascorbic acid, vitamin C, (VITAMIN C) 500 mg tablet Take  by mouth. Historical Provider CHOLECALCIFEROL, VITAMIN D3, (VITAMIN D3 PO) Take  by mouth. Historical Provider Vital Signs: Blood pressure 150/84, pulse 86, temperature 97.8 °F (36.6 °C), resp.  rate 18, height 5' 5.5\" (1.664 m), weight 43.1 kg (95 lb), SpO2 97 %. 
 
Physical Exam:  
General: alert Heart: regular rate and rhythm Lungs: no tachypnea, retractions or cyanosis, Heart exam - S1, S2 normal, no murmur, no gallop, rate regular Abdominal: Bowel sounds are normal, soft, non distended Plan of Care/Planned Procedure: EGD Signed: 
Adina Bee MD 9/24/2018

## 2018-09-24 NOTE — ROUTINE PROCESS
PATIENT DISCHARGED HOME VIA WHEELCHAIR WITH FAMILY. DISCHARGE INSTRUCTIONS GIVEN.  VSS AT DISCHARGE.

## 2018-10-08 PROBLEM — F11.20 CONTINUOUS OPIOID DEPENDENCE (HCC): Status: ACTIVE | Noted: 2018-10-08

## 2018-10-16 ENCOUNTER — HOSPITAL ENCOUNTER (OUTPATIENT)
Dept: GENERAL RADIOLOGY | Age: 68
Discharge: HOME | End: 2018-10-16
Payer: MEDICARE

## 2018-10-16 ENCOUNTER — HOSPITAL ENCOUNTER (OUTPATIENT)
Dept: SURGERY | Age: 68
Discharge: HOME | End: 2018-10-16
Payer: MEDICARE

## 2018-10-16 VITALS
WEIGHT: 100.25 LBS | HEIGHT: 66 IN | DIASTOLIC BLOOD PRESSURE: 95 MMHG | SYSTOLIC BLOOD PRESSURE: 147 MMHG | HEART RATE: 85 BPM | RESPIRATION RATE: 16 BRPM | BODY MASS INDEX: 16.11 KG/M2 | TEMPERATURE: 97.8 F | OXYGEN SATURATION: 98 %

## 2018-10-16 DIAGNOSIS — T85.528A DISPLACEMENT OF OTHER GASTROINTESTINAL PROSTHETIC DEVICES, IMPLANTS AND GRAFTS, INITIAL ENCOUNTER: ICD-10-CM

## 2018-10-16 LAB
ALBUMIN SERPL-MCNC: 4 G/DL (ref 3.2–4.6)
ALBUMIN/GLOB SERPL: 1.1 {RATIO} (ref 1.2–3.5)
ALP SERPL-CCNC: 84 U/L (ref 50–136)
ALT SERPL-CCNC: 21 U/L (ref 12–65)
ANION GAP SERPL CALC-SCNC: 7 MMOL/L (ref 7–16)
AST SERPL-CCNC: 24 U/L (ref 15–37)
BASOPHILS # BLD: 0 K/UL (ref 0–0.2)
BASOPHILS NFR BLD: 1 % (ref 0–2)
BILIRUB SERPL-MCNC: 0.7 MG/DL (ref 0.2–1.1)
BUN SERPL-MCNC: 12 MG/DL (ref 8–23)
CALCIUM SERPL-MCNC: 8.8 MG/DL (ref 8.3–10.4)
CHLORIDE SERPL-SCNC: 104 MMOL/L (ref 98–107)
CO2 SERPL-SCNC: 28 MMOL/L (ref 21–32)
CREAT SERPL-MCNC: 0.71 MG/DL (ref 0.6–1)
DIFFERENTIAL METHOD BLD: NORMAL
EOSINOPHIL # BLD: 0.1 K/UL (ref 0–0.8)
EOSINOPHIL NFR BLD: 1 % (ref 0.5–7.8)
ERYTHROCYTE [DISTWIDTH] IN BLOOD BY AUTOMATED COUNT: 12.6 %
GLOBULIN SER CALC-MCNC: 3.8 G/DL (ref 2.3–3.5)
GLUCOSE SERPL-MCNC: 63 MG/DL (ref 65–100)
HCT VFR BLD AUTO: 42.5 % (ref 35.8–46.3)
HGB BLD-MCNC: 14.2 G/DL (ref 11.7–15.4)
IMM GRANULOCYTES # BLD: 0 K/UL (ref 0–0.5)
IMM GRANULOCYTES NFR BLD AUTO: 0 % (ref 0–5)
INR PPP: 1
LYMPHOCYTES # BLD: 2.6 K/UL (ref 0.5–4.6)
LYMPHOCYTES NFR BLD: 41 % (ref 13–44)
MCH RBC QN AUTO: 31.9 PG (ref 26.1–32.9)
MCHC RBC AUTO-ENTMCNC: 33.4 G/DL (ref 31.4–35)
MCV RBC AUTO: 95.5 FL (ref 79.6–97.8)
MONOCYTES # BLD: 0.6 K/UL (ref 0.1–1.3)
MONOCYTES NFR BLD: 9 % (ref 4–12)
NEUTS SEG # BLD: 3.1 K/UL (ref 1.7–8.2)
NEUTS SEG NFR BLD: 48 % (ref 43–78)
NRBC # BLD: 0 K/UL (ref 0–0.2)
PLATELET # BLD AUTO: 169 K/UL (ref 150–450)
PMV BLD AUTO: 10.8 FL (ref 9.4–12.3)
POTASSIUM SERPL-SCNC: 3.5 MMOL/L (ref 3.5–5.1)
PROT SERPL-MCNC: 7.8 G/DL (ref 6.3–8.2)
PROTHROMBIN TIME: 12.3 SEC (ref 11.5–14.5)
RBC # BLD AUTO: 4.45 M/UL (ref 4.05–5.2)
SODIUM SERPL-SCNC: 139 MMOL/L (ref 136–145)
WBC # BLD AUTO: 6.4 K/UL (ref 4.3–11.1)

## 2018-10-16 PROCEDURE — 85025 COMPLETE CBC W/AUTO DIFF WBC: CPT

## 2018-10-16 PROCEDURE — 74018 RADEX ABDOMEN 1 VIEW: CPT

## 2018-10-16 PROCEDURE — 80053 COMPREHEN METABOLIC PANEL: CPT

## 2018-10-16 PROCEDURE — 85610 PROTHROMBIN TIME: CPT

## 2018-10-16 RX ORDER — SODIUM CHLORIDE 0.9 % (FLUSH) 0.9 %
5-10 SYRINGE (ML) INJECTION EVERY 8 HOURS
Status: CANCELLED | OUTPATIENT
Start: 2018-10-16

## 2018-10-16 RX ORDER — OXYCODONE HYDROCHLORIDE 10 MG/1
10 TABLET ORAL
COMMUNITY
End: 2018-11-06 | Stop reason: SDUPTHER

## 2018-10-16 RX ORDER — SODIUM CHLORIDE 0.9 % (FLUSH) 0.9 %
5-10 SYRINGE (ML) INJECTION AS NEEDED
Status: CANCELLED | OUTPATIENT
Start: 2018-10-16

## 2018-10-16 NOTE — PERIOP NOTES
Recent Results (from the past 12 hour(s)) CBC WITH AUTOMATED DIFF Collection Time: 10/16/18 10:47 AM  
Result Value Ref Range WBC 6.4 4.3 - 11.1 K/uL  
 RBC 4.45 4.05 - 5.2 M/uL  
 HGB 14.2 11.7 - 15.4 g/dL HCT 42.5 35.8 - 46.3 % MCV 95.5 79.6 - 97.8 FL  
 MCH 31.9 26.1 - 32.9 PG  
 MCHC 33.4 31.4 - 35.0 g/dL  
 RDW 12.6 % PLATELET 471 455 - 113 K/uL MPV 10.8 9.4 - 12.3 FL ABSOLUTE NRBC 0.00 0.0 - 0.2 K/uL  
 DF AUTOMATED NEUTROPHILS 48 43 - 78 % LYMPHOCYTES 41 13 - 44 % MONOCYTES 9 4.0 - 12.0 % EOSINOPHILS 1 0.5 - 7.8 % BASOPHILS 1 0.0 - 2.0 % IMMATURE GRANULOCYTES 0 0.0 - 5.0 %  
 ABS. NEUTROPHILS 3.1 1.7 - 8.2 K/UL  
 ABS. LYMPHOCYTES 2.6 0.5 - 4.6 K/UL  
 ABS. MONOCYTES 0.6 0.1 - 1.3 K/UL  
 ABS. EOSINOPHILS 0.1 0.0 - 0.8 K/UL  
 ABS. BASOPHILS 0.0 0.0 - 0.2 K/UL  
 ABS. IMM. GRANS. 0.0 0.0 - 0.5 K/UL METABOLIC PANEL, COMPREHENSIVE Collection Time: 10/16/18  1:19 PM  
Result Value Ref Range Sodium 139 136 - 145 mmol/L Potassium 3.5 3.5 - 5.1 mmol/L Chloride 104 98 - 107 mmol/L  
 CO2 28 21 - 32 mmol/L Anion gap 7 7 - 16 mmol/L Glucose 63 (L) 65 - 100 mg/dL BUN 12 8 - 23 MG/DL Creatinine 0.71 0.6 - 1.0 MG/DL  
 GFR est AA >60 >60 ml/min/1.73m2 GFR est non-AA >60 >60 ml/min/1.73m2 Calcium 8.8 8.3 - 10.4 MG/DL Bilirubin, total 0.7 0.2 - 1.1 MG/DL  
 ALT (SGPT) 21 12 - 65 U/L  
 AST (SGOT) 24 15 - 37 U/L Alk. phosphatase 84 50 - 136 U/L Protein, total 7.8 6.3 - 8.2 g/dL Albumin 4.0 3.2 - 4.6 g/dL Globulin 3.8 (H) 2.3 - 3.5 g/dL A-G Ratio 1.1 (L) 1.2 - 3.5 PROTHROMBIN TIME + INR Collection Time: 10/16/18  1:19 PM  
Result Value Ref Range Prothrombin time 12.3 11.5 - 14.5 sec INR 1.0

## 2018-10-16 NOTE — PERIOP NOTES
Patient verified name and . Patient provided medical/health information and PTA medications to the best of their ability. TYPE  CASE:1B Order for consent Orders in EHR and consent verified. Labs per surgeon:CBC, CMP, PT/INR Labs per anesthesia protocol: no additional 
EKG  :  NA 
 
Patient provided with and instructed on education handouts including Guide to Surgery, blood transfusions, pain management, and hand hygiene for the family and community, and [facility]. Hibiclens and instructions given per hospital policy. Instructed patient to continue previous medications as prescribed prior to surgery unless otherwise directed and to take the following medications the day of surgery according to anesthesia guidelines : Ativan or Oxycodone as instructed if needed . Instructed patient to hold  the following medications: Vitamin C-all other vitamins, supplements, NSAIDs. Original medication prescription bottles were not visualized during patient appointment. Medication profile updated and reviewed with patient. Patient teach back successful and patient demonstrates knowledge of instruction.

## 2018-11-12 ENCOUNTER — ANESTHESIA EVENT (OUTPATIENT)
Dept: SURGERY | Age: 68
End: 2018-11-12
Payer: MEDICARE

## 2018-11-13 ENCOUNTER — ANESTHESIA (OUTPATIENT)
Dept: SURGERY | Age: 68
End: 2018-11-13
Payer: MEDICARE

## 2018-11-13 ENCOUNTER — HOSPITAL ENCOUNTER (OUTPATIENT)
Age: 68
Setting detail: OUTPATIENT SURGERY
Discharge: HOME | End: 2018-11-13
Attending: SURGERY | Admitting: SURGERY
Payer: MEDICARE

## 2018-11-13 VITALS
WEIGHT: 98.25 LBS | DIASTOLIC BLOOD PRESSURE: 94 MMHG | HEART RATE: 69 BPM | SYSTOLIC BLOOD PRESSURE: 142 MMHG | BODY MASS INDEX: 15.79 KG/M2 | HEIGHT: 66 IN | TEMPERATURE: 99.4 F | RESPIRATION RATE: 22 BRPM | OXYGEN SATURATION: 96 %

## 2018-11-13 PROBLEM — R74.8 ELEVATED LIVER ENZYMES: Status: ACTIVE | Noted: 2018-11-13

## 2018-11-13 PROCEDURE — 77030019908 HC STETH ESOPH SIMS -A: Performed by: ANESTHESIOLOGY

## 2018-11-13 PROCEDURE — 74011250636 HC RX REV CODE- 250/636: Performed by: ANESTHESIOLOGY

## 2018-11-13 PROCEDURE — 77030008522 HC TBNG INSUF LAPRO STRY -B: Performed by: SURGERY

## 2018-11-13 PROCEDURE — 74011250636 HC RX REV CODE- 250/636

## 2018-11-13 PROCEDURE — 88304 TISSUE EXAM BY PATHOLOGIST: CPT

## 2018-11-13 PROCEDURE — 77030037088 HC TUBE ENDOTRACH ORAL NSL COVD-A: Performed by: ANESTHESIOLOGY

## 2018-11-13 PROCEDURE — 76060000034 HC ANESTHESIA 1.5 TO 2 HR: Performed by: SURGERY

## 2018-11-13 PROCEDURE — 76210000003 HC OR PH I REC 3.5 TO 4 HR: Performed by: SURGERY

## 2018-11-13 PROCEDURE — 88313 SPECIAL STAINS GROUP 2: CPT

## 2018-11-13 PROCEDURE — 88312 SPECIAL STAINS GROUP 1: CPT

## 2018-11-13 PROCEDURE — 77030000038 HC TIP SCIS LAPSCP SURI -B: Performed by: SURGERY

## 2018-11-13 PROCEDURE — 77030018876 HC APPL CLP LIG4 COVD -B: Performed by: SURGERY

## 2018-11-13 PROCEDURE — 76210000020 HC REC RM PH II FIRST 0.5 HR: Performed by: SURGERY

## 2018-11-13 PROCEDURE — 77030032490 HC SLV COMPR SCD KNE COVD -B: Performed by: SURGERY

## 2018-11-13 PROCEDURE — 77030035048 HC TRCR ENDOSC OPTCL COVD -B: Performed by: SURGERY

## 2018-11-13 PROCEDURE — 74011250637 HC RX REV CODE- 250/637: Performed by: ANESTHESIOLOGY

## 2018-11-13 PROCEDURE — 74011000250 HC RX REV CODE- 250

## 2018-11-13 PROCEDURE — 77030035051: Performed by: SURGERY

## 2018-11-13 PROCEDURE — 77030008467 HC STPLR SKN COVD -B: Performed by: SURGERY

## 2018-11-13 PROCEDURE — 74011250636 HC RX REV CODE- 250/636: Performed by: SURGERY

## 2018-11-13 PROCEDURE — 77030031139 HC SUT VCRL2 J&J -A: Performed by: SURGERY

## 2018-11-13 PROCEDURE — 77030035044 HC TRCR ENDOSC VRSPRT BLDLSS COVD -C: Performed by: SURGERY

## 2018-11-13 PROCEDURE — 74011000250 HC RX REV CODE- 250: Performed by: SURGERY

## 2018-11-13 PROCEDURE — 77030020782 HC GWN BAIR PAWS FLX 3M -B: Performed by: ANESTHESIOLOGY

## 2018-11-13 PROCEDURE — 77030021158 HC TRCR BLN GELPRT AMR -B: Performed by: SURGERY

## 2018-11-13 PROCEDURE — 76010000161 HC OR TIME 1 TO 1.5 HR INTENSV-TIER 1: Performed by: SURGERY

## 2018-11-13 PROCEDURE — 77030025088 HC APPL CLP LIG 1 COVD -B: Performed by: SURGERY

## 2018-11-13 PROCEDURE — 77030039425 HC BLD LARYNG TRULITE DISP TELE -A: Performed by: ANESTHESIOLOGY

## 2018-11-13 PROCEDURE — 88307 TISSUE EXAM BY PATHOLOGIST: CPT

## 2018-11-13 RX ORDER — ALBUTEROL SULFATE 0.83 MG/ML
2.5 SOLUTION RESPIRATORY (INHALATION) AS NEEDED
Status: DISCONTINUED | OUTPATIENT
Start: 2018-11-13 | End: 2018-11-13 | Stop reason: HOSPADM

## 2018-11-13 RX ORDER — MIDAZOLAM HYDROCHLORIDE 1 MG/ML
2 INJECTION, SOLUTION INTRAMUSCULAR; INTRAVENOUS ONCE
Status: DISCONTINUED | OUTPATIENT
Start: 2018-11-13 | End: 2018-11-13 | Stop reason: HOSPADM

## 2018-11-13 RX ORDER — FENTANYL CITRATE 50 UG/ML
INJECTION, SOLUTION INTRAMUSCULAR; INTRAVENOUS AS NEEDED
Status: DISCONTINUED | OUTPATIENT
Start: 2018-11-13 | End: 2018-11-13 | Stop reason: HOSPADM

## 2018-11-13 RX ORDER — OXYCODONE HYDROCHLORIDE 5 MG/1
5 TABLET ORAL
Status: DISCONTINUED | OUTPATIENT
Start: 2018-11-13 | End: 2018-11-13 | Stop reason: HOSPADM

## 2018-11-13 RX ORDER — KETOROLAC TROMETHAMINE 30 MG/ML
INJECTION, SOLUTION INTRAMUSCULAR; INTRAVENOUS AS NEEDED
Status: DISCONTINUED | OUTPATIENT
Start: 2018-11-13 | End: 2018-11-13 | Stop reason: HOSPADM

## 2018-11-13 RX ORDER — PROPOFOL 10 MG/ML
INJECTION, EMULSION INTRAVENOUS AS NEEDED
Status: DISCONTINUED | OUTPATIENT
Start: 2018-11-13 | End: 2018-11-13 | Stop reason: HOSPADM

## 2018-11-13 RX ORDER — LABETALOL HYDROCHLORIDE 5 MG/ML
INJECTION, SOLUTION INTRAVENOUS AS NEEDED
Status: DISCONTINUED | OUTPATIENT
Start: 2018-11-13 | End: 2018-11-13 | Stop reason: HOSPADM

## 2018-11-13 RX ORDER — ROCURONIUM BROMIDE 10 MG/ML
INJECTION, SOLUTION INTRAVENOUS AS NEEDED
Status: DISCONTINUED | OUTPATIENT
Start: 2018-11-13 | End: 2018-11-13 | Stop reason: HOSPADM

## 2018-11-13 RX ORDER — HYDROMORPHONE HYDROCHLORIDE 2 MG/ML
0.5 INJECTION, SOLUTION INTRAMUSCULAR; INTRAVENOUS; SUBCUTANEOUS
Status: DISCONTINUED | OUTPATIENT
Start: 2018-11-13 | End: 2018-11-13 | Stop reason: HOSPADM

## 2018-11-13 RX ORDER — ONDANSETRON 2 MG/ML
4 INJECTION INTRAMUSCULAR; INTRAVENOUS ONCE
Status: DISCONTINUED | OUTPATIENT
Start: 2018-11-13 | End: 2018-11-13 | Stop reason: HOSPADM

## 2018-11-13 RX ORDER — MIDAZOLAM HYDROCHLORIDE 1 MG/ML
2 INJECTION, SOLUTION INTRAMUSCULAR; INTRAVENOUS
Status: COMPLETED | OUTPATIENT
Start: 2018-11-13 | End: 2018-11-13

## 2018-11-13 RX ORDER — ACETAMINOPHEN 10 MG/ML
INJECTION, SOLUTION INTRAVENOUS AS NEEDED
Status: DISCONTINUED | OUTPATIENT
Start: 2018-11-13 | End: 2018-11-13 | Stop reason: HOSPADM

## 2018-11-13 RX ORDER — SODIUM CHLORIDE 0.9 % (FLUSH) 0.9 %
5-10 SYRINGE (ML) INJECTION AS NEEDED
Status: DISCONTINUED | OUTPATIENT
Start: 2018-11-13 | End: 2018-11-13 | Stop reason: HOSPADM

## 2018-11-13 RX ORDER — NALOXONE HYDROCHLORIDE 0.4 MG/ML
0.1 INJECTION, SOLUTION INTRAMUSCULAR; INTRAVENOUS; SUBCUTANEOUS AS NEEDED
Status: DISCONTINUED | OUTPATIENT
Start: 2018-11-13 | End: 2018-11-13 | Stop reason: HOSPADM

## 2018-11-13 RX ORDER — SODIUM CHLORIDE 0.9 % (FLUSH) 0.9 %
5-10 SYRINGE (ML) INJECTION EVERY 8 HOURS
Status: DISCONTINUED | OUTPATIENT
Start: 2018-11-13 | End: 2018-11-13 | Stop reason: HOSPADM

## 2018-11-13 RX ORDER — DIPHENHYDRAMINE HYDROCHLORIDE 50 MG/ML
12.5 INJECTION, SOLUTION INTRAMUSCULAR; INTRAVENOUS
Status: DISCONTINUED | OUTPATIENT
Start: 2018-11-13 | End: 2018-11-13 | Stop reason: HOSPADM

## 2018-11-13 RX ORDER — HYDROMORPHONE HYDROCHLORIDE 2 MG/ML
0.2 INJECTION, SOLUTION INTRAMUSCULAR; INTRAVENOUS; SUBCUTANEOUS
Status: DISCONTINUED | OUTPATIENT
Start: 2018-11-13 | End: 2018-11-13 | Stop reason: HOSPADM

## 2018-11-13 RX ORDER — LIDOCAINE HYDROCHLORIDE 10 MG/ML
0.1 INJECTION INFILTRATION; PERINEURAL AS NEEDED
Status: DISCONTINUED | OUTPATIENT
Start: 2018-11-13 | End: 2018-11-13 | Stop reason: HOSPADM

## 2018-11-13 RX ORDER — SODIUM CHLORIDE, SODIUM LACTATE, POTASSIUM CHLORIDE, CALCIUM CHLORIDE 600; 310; 30; 20 MG/100ML; MG/100ML; MG/100ML; MG/100ML
100 INJECTION, SOLUTION INTRAVENOUS CONTINUOUS
Status: DISCONTINUED | OUTPATIENT
Start: 2018-11-13 | End: 2018-11-13 | Stop reason: HOSPADM

## 2018-11-13 RX ORDER — DEXAMETHASONE SODIUM PHOSPHATE 4 MG/ML
INJECTION, SOLUTION INTRA-ARTICULAR; INTRALESIONAL; INTRAMUSCULAR; INTRAVENOUS; SOFT TISSUE AS NEEDED
Status: DISCONTINUED | OUTPATIENT
Start: 2018-11-13 | End: 2018-11-13 | Stop reason: HOSPADM

## 2018-11-13 RX ORDER — BUPIVACAINE HYDROCHLORIDE 2.5 MG/ML
INJECTION, SOLUTION EPIDURAL; INFILTRATION; INTRACAUDAL AS NEEDED
Status: DISCONTINUED | OUTPATIENT
Start: 2018-11-13 | End: 2018-11-13 | Stop reason: HOSPADM

## 2018-11-13 RX ORDER — LIDOCAINE HYDROCHLORIDE 20 MG/ML
INJECTION, SOLUTION EPIDURAL; INFILTRATION; INTRACAUDAL; PERINEURAL AS NEEDED
Status: DISCONTINUED | OUTPATIENT
Start: 2018-11-13 | End: 2018-11-13 | Stop reason: HOSPADM

## 2018-11-13 RX ORDER — ONDANSETRON 2 MG/ML
INJECTION INTRAMUSCULAR; INTRAVENOUS AS NEEDED
Status: DISCONTINUED | OUTPATIENT
Start: 2018-11-13 | End: 2018-11-13 | Stop reason: HOSPADM

## 2018-11-13 RX ORDER — FENTANYL CITRATE 50 UG/ML
100 INJECTION, SOLUTION INTRAMUSCULAR; INTRAVENOUS ONCE
Status: DISCONTINUED | OUTPATIENT
Start: 2018-11-13 | End: 2018-11-13 | Stop reason: HOSPADM

## 2018-11-13 RX ORDER — OXYCODONE HYDROCHLORIDE 5 MG/1
10 TABLET ORAL
Status: COMPLETED | OUTPATIENT
Start: 2018-11-13 | End: 2018-11-13

## 2018-11-13 RX ORDER — CEFAZOLIN SODIUM/WATER 2 G/20 ML
2 SYRINGE (ML) INTRAVENOUS ONCE
Status: COMPLETED | OUTPATIENT
Start: 2018-11-13 | End: 2018-11-13

## 2018-11-13 RX ADMIN — PROPOFOL 100 MG: 10 INJECTION, EMULSION INTRAVENOUS at 09:06

## 2018-11-13 RX ADMIN — HYDROMORPHONE HYDROCHLORIDE 0.5 MG: 2 INJECTION, SOLUTION INTRAMUSCULAR; INTRAVENOUS; SUBCUTANEOUS at 10:39

## 2018-11-13 RX ADMIN — HYDROMORPHONE HYDROCHLORIDE 0.2 MG: 2 INJECTION, SOLUTION INTRAMUSCULAR; INTRAVENOUS; SUBCUTANEOUS at 11:39

## 2018-11-13 RX ADMIN — LIDOCAINE HYDROCHLORIDE 50 MG: 10 INJECTION, SOLUTION INFILTRATION; PERINEURAL at 09:06

## 2018-11-13 RX ADMIN — HYDROMORPHONE HYDROCHLORIDE 0.5 MG: 2 INJECTION, SOLUTION INTRAMUSCULAR; INTRAVENOUS; SUBCUTANEOUS at 10:34

## 2018-11-13 RX ADMIN — DEXAMETHASONE SODIUM PHOSPHATE 4 MG: 4 INJECTION, SOLUTION INTRA-ARTICULAR; INTRALESIONAL; INTRAMUSCULAR; INTRAVENOUS; SOFT TISSUE at 09:17

## 2018-11-13 RX ADMIN — HYDROMORPHONE HYDROCHLORIDE 0.2 MG: 2 INJECTION, SOLUTION INTRAMUSCULAR; INTRAVENOUS; SUBCUTANEOUS at 11:46

## 2018-11-13 RX ADMIN — OXYCODONE HYDROCHLORIDE 10 MG: 5 TABLET ORAL at 11:18

## 2018-11-13 RX ADMIN — ACETAMINOPHEN 1000 MG: 10 INJECTION, SOLUTION INTRAVENOUS at 10:00

## 2018-11-13 RX ADMIN — Medication 2 G: at 09:01

## 2018-11-13 RX ADMIN — HYDROMORPHONE HYDROCHLORIDE 0.2 MG: 2 INJECTION, SOLUTION INTRAMUSCULAR; INTRAVENOUS; SUBCUTANEOUS at 11:24

## 2018-11-13 RX ADMIN — HYDROMORPHONE HYDROCHLORIDE 0.2 MG: 2 INJECTION, SOLUTION INTRAMUSCULAR; INTRAVENOUS; SUBCUTANEOUS at 11:52

## 2018-11-13 RX ADMIN — MIDAZOLAM HYDROCHLORIDE 2 MG: 2 INJECTION, SOLUTION INTRAMUSCULAR; INTRAVENOUS at 08:24

## 2018-11-13 RX ADMIN — FENTANYL CITRATE 100 MCG: 50 INJECTION, SOLUTION INTRAMUSCULAR; INTRAVENOUS at 09:06

## 2018-11-13 RX ADMIN — KETOROLAC TROMETHAMINE 30 MG: 30 INJECTION, SOLUTION INTRAMUSCULAR; INTRAVENOUS at 10:09

## 2018-11-13 RX ADMIN — ONDANSETRON 4 MG: 2 INJECTION INTRAMUSCULAR; INTRAVENOUS at 10:01

## 2018-11-13 RX ADMIN — ROCURONIUM BROMIDE 50 MG: 10 INJECTION, SOLUTION INTRAVENOUS at 09:06

## 2018-11-13 RX ADMIN — LIDOCAINE HYDROCHLORIDE 40 MG: 20 INJECTION, SOLUTION EPIDURAL; INFILTRATION; INTRACAUDAL; PERINEURAL at 10:25

## 2018-11-13 RX ADMIN — SODIUM CHLORIDE, SODIUM LACTATE, POTASSIUM CHLORIDE, AND CALCIUM CHLORIDE 100 ML/HR: 600; 310; 30; 20 INJECTION, SOLUTION INTRAVENOUS at 08:00

## 2018-11-13 RX ADMIN — LABETALOL HYDROCHLORIDE 2.5 MG: 5 INJECTION, SOLUTION INTRAVENOUS at 09:31

## 2018-11-13 RX ADMIN — HYDROMORPHONE HYDROCHLORIDE 0.2 MG: 2 INJECTION, SOLUTION INTRAMUSCULAR; INTRAVENOUS; SUBCUTANEOUS at 12:05

## 2018-11-13 RX ADMIN — HYDROMORPHONE HYDROCHLORIDE 0.2 MG: 2 INJECTION, SOLUTION INTRAMUSCULAR; INTRAVENOUS; SUBCUTANEOUS at 11:34

## 2018-11-13 RX ADMIN — HYDROMORPHONE HYDROCHLORIDE 0.2 MG: 2 INJECTION, SOLUTION INTRAMUSCULAR; INTRAVENOUS; SUBCUTANEOUS at 11:58

## 2018-11-13 RX ADMIN — HYDROMORPHONE HYDROCHLORIDE 0.5 MG: 2 INJECTION, SOLUTION INTRAMUSCULAR; INTRAVENOUS; SUBCUTANEOUS at 10:29

## 2018-11-13 RX ADMIN — HYDROMORPHONE HYDROCHLORIDE 0.2 MG: 2 INJECTION, SOLUTION INTRAMUSCULAR; INTRAVENOUS; SUBCUTANEOUS at 11:29

## 2018-11-13 RX ADMIN — HYDROMORPHONE HYDROCHLORIDE 0.5 MG: 2 INJECTION, SOLUTION INTRAMUSCULAR; INTRAVENOUS; SUBCUTANEOUS at 10:47

## 2018-11-13 RX ADMIN — SODIUM CHLORIDE, SODIUM LACTATE, POTASSIUM CHLORIDE, AND CALCIUM CHLORIDE: 600; 310; 30; 20 INJECTION, SOLUTION INTRAVENOUS at 10:00

## 2018-11-13 NOTE — OP NOTES
[facility] [address], [address] [facility]  [phone]    OPERATVE REPORT    Name: Twila Pacheco     Date of Surgery: 11/13/2018  Med Record Number: [id number]   Age: 80 y.o. Sex: female   Pre-operative Diagnosis:   Chronic Calculous Cholecystitis  H/o Hepatitis C  H/O Elevated LFTs  >20 prior abd surgeries    Post-operative Diagnosis: same  Procedure:   Laparoscopic Cholecystectomy  Laparoscopic wedge liver biopsy  Surgeon: Massiel Benton MD  Asistants: none  Anesthesia:  General  Complications: none  Specimen: gallbladder to path; wedge liver biopsy to path  Estimated Blood Loss: <30cc    Procedure Description:   The risks, benefits, potential complications, treatment options, and expected outcomes were discussed with the patient pre-operatively. The possibilities of reaction to medication, chronic pain, bleeding, infection, injury to internal organs including bowel or  bile ducts, blood clots, hernia, bile leak, the need for further surgery, open surgery, etc.. .. Hung Crespo were discussed with the patient. The patient voiced understanding and gave informed consent preoperatively. The patient was taken to the Operating Room, and the Sapna Zuniga time-out protocol checklist was followed. After the induction of adequate anesthesia, the abdomen was prepped and draped in the usual sterile fashion. Preoperative antibiotics were given. A sub umbilical incision was made and a cut down approach was used to place a blunt Bela trochar under direct vision. After adequate pneumoperitioneum, two 5mm static and dynamic retraction ports were placed and an 11mm trochar also placed, all in the usual locations. The gallbladder was dilated, redundant, and distended. The liver was grossly abnormal with fibrotic and mild-moderate cirrhotic changes with signs of portal HTN with some dilated venous tributerirs on the GB wall.   The gallbladder was retracted and inflammatory adhesions to the inferior aspect of the gallbladder were taken down. Careful and tedious dissection was performed to free up the cystic duct and artery from the surrounding tissues. A clear window was created between the inferior aspect of the gallbladder wall, the cystic duct, and the cystic artery. The cystic duct could clearly be seen to enter the gallbladder and the cystic artery seen to traverse on the gallbladder wall toward the fundus of the gallbladder. The critical view of safety was achieved. We placed 3 clips on the distal cystic duct (patient side) and 2 clips on the proximal (specimen side) of the cystic duct. We then placed 2 clips on the proximal cystic artery and 2 clips on the distal cystic artery. Both structures were then divided. The cystic artery could be seen to pulsate at its clipped end as usual.  The gallbladder was the removed from its attachments to the liver. Small venous tributaries were clipped as needed as dissection was carried up toward the gallbladder fundus. The gallbladder was retracted out through the umbilical trochar suite with the camera placed temporarily through the epigastric trochar site. After the gallbladder had been removed, we turned our attention to the task the liver biopsy. The liver looked abnormal and had signs of hepatic steatosis. A liver wedge biopsy was obtained from the lateral aspect of segment IV at the apex of the fundal/dome region of the gallbladder fossa. This was performed with cautery around the border of the biopsy site and then curved scissors to wedge out an adequate specimen for histologic review. The gallbladder fossa and liver biopsy site were inspected. No bile leaks were seen, the clips on the artery and duct were intact and hemostatsis confirmed. Marcaine was infiltrated into the pre peritoneal tissues around each trochar site. The fascia of the umbilical incision was closed with interrupted 0 vicryl suture.   Clips were placed to close the skin incisions. The wounds were dressed sterilely with telfa and tegaderm, All sponge, instruments, and needle counts were correct. The patient was taken to recovery in good condition.     Randall Tinoco MD, FACS

## 2018-11-13 NOTE — ANESTHESIA PREPROCEDURE EVALUATION
Anesthetic History Review of Systems / Medical History Patient summary reviewed, nursing notes reviewed and pertinent labs reviewed Pulmonary Neuro/Psych Cardiovascular Exercise tolerance: >4 METS 
  
GI/Hepatic/Renal 
  
 
 
 
 
 
 Endo/Other Other Findings Physical Exam 
 
Airway Mallampati: II 
TM Distance: 4 - 6 cm Neck ROM: normal range of motion Mouth opening: Normal 
 
 Cardiovascular Regular rate and rhythm,  S1 and S2 normal,  no murmur, click, rub, or gallop Dental 
No notable dental hx Pulmonary Breath sounds clear to auscultation Abdominal 
GI exam deferred Other Findings Anesthetic Plan ASA: 2 Anesthesia type: general 
 
 
 
 
Induction: Intravenous Anesthetic plan and risks discussed with: Patient

## 2018-11-13 NOTE — ANESTHESIA POSTPROCEDURE EVALUATION
Procedure(s): CHOLECYSTECTOMY LAPAROSCOPIC. Anesthesia Post Evaluation Multimodal analgesia: multimodal analgesia used between 6 hours prior to anesthesia start to PACU discharge Patient location during evaluation: PACU Patient participation: complete - patient participated Level of consciousness: awake and awake and alert Pain management: adequate Airway patency: patent Anesthetic complications: no 
Cardiovascular status: acceptable Respiratory status: acceptable Hydration status: acceptable Visit Vitals /74 Pulse 70 Temp 37.4 °C (99.4 °F) Resp 19 Ht 5' 5.5\" (1.664 m) Wt 44.6 kg (98 lb 4 oz) SpO2 96% BMI 16.10 kg/m²

## 2018-11-13 NOTE — DISCHARGE INSTRUCTIONS
Leave dressings 5-6 days. Then remove, but keep incisions covered daily until follow-up. Try to keep incisions as dry as possible to lower risk of infection. No heavy lifting (>5lbs) for 6 weeks to reduce risk of developing a hernia in the incisions. No driving until you are off pain meds for 24hrs and have no pain with movements associated with driving. Pain prescription (Norco) on chart for patient to use as needed for pain    Follow-up with Dr Alejandro Jack in the office at:  [address], [address]  (Call for an appt time-->[phone]-->option 1)    [place] foods today, nothing spicy or greasy    After general anesthesia or intravenous sedation, for 24 hours or while taking prescription Narcotics:  · Limit your activities  · Do not drive and operate hazardous machinery  · Do not make important personal or business decisions  · Do  not drink alcoholic beverages  · If you have not urinated within 8 hours after discharge, please contact your surgeon on call. *  Please give a list of your current medications to your Primary Care Provider. *  Please update this list whenever your medications are discontinued, doses are      changed, or new medications (including over-the-counter products) are added. *  Please carry medication information at all times in case of emergency situations. These are general instructions for a healthy lifestyle:  No smoking/ No tobacco products/ Avoid exposure to second hand smoke  Surgeon General's Warning:  Quitting smoking now greatly reduces serious risk to your health.   Obesity, smoking, and sedentary lifestyle greatly increases your risk for illness  A healthy diet, regular physical exercise & weight monitoring are important for maintaining a healthy lifestyle    You may be retaining fluid if you have a history of heart failure or if you experience any of the following symptoms:  Weight gain of 3 pounds or more overnight or 5 pounds in a week, increased swelling in our hands or feet or shortness of breath while lying flat in bed. Please call your doctor as soon as you notice any of these symptoms; do not wait until your next office visit. Recognize signs and symptoms of STROKE:  F-face looks uneven  A-arms unable to move or move unevenly  S-speech slurred or non-existent  T-time-call 911 as soon as signs and symptoms begin-DO NOT go       Back to bed or wait to see if you get better-TIME IS BRAIN.

## 2018-11-13 NOTE — H&P
H&P/Consult Note/Progress Note/Office Note:  
Nader Godwin  MRN: [id number]  :1950  Age:68 y.o. 
 
HPI: Nader Godwin is a 80 y.o. female with h/o Hep C who reports over 20 abdominal surgeries in the past and who was referred for evaluation for possible cholecystectomy. She reports intermittent episodes of severe right upper quadrant pain with radiation to her back for over a year. She reports only occasional relief with antacids. She had known gallstones since at least  and most recently developed an attack of severe RUQ  pain which was worse than previous attacks and led to diagnosis of choledocholithiasis requiring ERCP, stone extraction, and stent placement. She reported she was scheduled to have her stent removed by GI on 2018. I saw her in  gave her a prescription for Actigall which she did not take in an effort to dissolve her stones. At that time she was having epigastric pain, on multiple narcotics, and had untreated Hep C. Her CT identified no liver mass or pancreatic mass but this showed hepatic ductal dilation. At this time she reports her Hep C has been treated her platelet count is normal. 
She denies any history of anyone ever telling her she has cirrhosis 18 abd U/S Hx: chronic pain sydrome, gallstone, abd adhesions, hep c w/o coma COMPARISON: Abdominal ultrasound 2016, abdominal CT 3/5/2015 
  
PANCREAS: Unremarkable in its visualized portions. LIVER: Size: 16.5 cm, normal in size. Echogenicity: Normal Biliary system: No intrahepatic biliary ductal dilation. MAIN PORTAL VEIN: Doppler evaluation: Patent. Normal hepatopedal flow. Normal portal venous waveform. IMAGED PORTIONS OF THE INFERIOR VENA CAVA: Unremarkable. GALLBLADDER:  Gallbladder wall: 0.3 cm, within normal limits in thickness. Gallbladder lumen: As demonstrated on prior exams, there are innumerable shadowing calculi in the gallbladder lumen. There is no pericholecystic fluid. Sonographic Ghotra sign: Negative per the sonographer. COMMON BILE DUCT: Size: 1.1 cm, enlarged. There is a probable shadowing calculus measuring 0.6 cm 
within the distal common bile duct near the pancreatic head. RIGHT KIDNEY: Size: 11.8 cm Cortex: Normal renal cortical echogenicity. Collecting system: No evidence of hydronephrosis. The spleen measures 11.8 cm and demonstrates unremarkable echogenicity. 
  
The left kidney measures 11.3 cm. There is a 0.7 cm echogenic focus without 
significant associated shadowing within the left renal parenchyma. This is 
well-circumscribed. This may represent a focal calcification. There is mild left renal pelviectasis. 
  
The imaged portions of the abdominal aorta are normal in caliber, measuring up to 1.6 cm. There is no evidence of right upper quadrant free fluid. 
  
IMPRESSION: 
The common bile duct is abnormally enlarged, measuring up to 1.1 cm. On the abdominal ultrasound of 4/29/2016, the common bile duct measured 0.4 cm. There is a 0.6 cm shadowing focus within the distal common bile duct, most likely a stone. There is no intrahepatic biliary ductal dilation. 
  
The overall constellation of findings favor early/biliary obstruction upstream 
from a 0.6 cm calculus in the distal common bile duct. There is as yet no 
significant intrahepatic biliary ductal dilation. Evaluation by gastroenterology 
with consideration towards MRCP or ERCP is recommended. 
  
Extensive cholelithiasis without evidence of acute cholecystitis. 
  
There is a 0.7 cm echogenic focus involving the left renal parenchyma. There is 
no substantial associated shadowing. This is indeterminate but could represent a 
parenchymal calcification. Mild left renal pelviectasis Prior abd/pelvis CT from 2015 showed: The liver appears homogeneous. No enhancing lesions. No obvious changes of cirrhosis. The intrahepatic biliary tree is mildly dilated. The CBD measures 9 mm, borderline enlarged. No obstructing mass identified. Pancreas grossly unremarkable. 
  
Multiple small gallstones are present. The spleen enhances uniformly. No free 
fluid, acute inflammatory changes or adenopathy. IMPRESSION:  Cholelithiasis and mild biliary tree dilation, probably 
obstruction. ERCP should be considered. No focal liver lesion. Katherine Mcgraw 8/22/18 s/p ERCP and prophylactic PD duct stent placement (Dr Beau Soto): Choledocholithiasis FINDINGS: 
STOMACH: moderate amount of retained food in the gastric fundus and body, suggesting gastroparesis.  confirms oxycodone use. AMPULLA: Normal, small BILE DUCT: cannulated with some difficulty using a 44 sphincterotome over a 0.035 wire. OThe wire repeatedly and went in a pancreatic trajectory despite multiple attempts at repositioning. The wire was left in PT and a second wire was advanced through the tome but also went into the pancreatic duct. Then a 5 [place] 5 cm plastic temporary single pigtail stent was placed into the pancreatic duct with fluoroscopic guidance. This allowed prompt cannulation of the bile duct. Wire advanced into intrahepatic ducts under fluoroscopic guidance, and a cholangiogram was obtained. This revealed multiple mobile filling defects consistent with stones. The CBD was mildly dilated, 10-11mm. partial filling of the the cystic duct, intentionally underfilled. An 8mm biliary sphincterotomy was performed over a guidewire without complications. The tome was exchanged for a 9/12mm extraction balloon. Multiple CBD stones were removed from the duct. Repeat occlusion cholangiogram was clear. PANCREATIC DUCT: Limited Pancreatogram was Normal in the head. Body and tail were not filled. 
  
ASSESSMENT/PLAN: 
1. Clears x 2 hrs, then low fat diet for 24 hrs 2. Avoid ASA/NSAIDs x 2 weeks after biliary sphincterotomy 3. Surgical referral for cholecystectomy She is s/p EGD () which she reports was unremarkable by Dr Santo oTro And prior to that was s/p EGD with Dr. Jeanne Yi in  shown below. 5/25/15 s/p EGD (Dr Alexey Trinidad) DIAGNOSIS 
A: DUODENAL BIOPSIES: UNREMARKABLE SMALL INTESTINAL MUCOSA. B: GASTRIC BIOPSIES: UNREMARKABLE GASTRIC MUCOSA. Electronically signed out on 2015 09:56 by Miguel Angel Valentine. Esau Oro MD 
 
She reports an unremarkable colonoscopy in 2016 She reported a long and complicated surgical history. She reports over 20 laparoscopies for endometriosis. She is status post  ×2. She eventually had total abdominal hysterectomy and bilateral salpingo-oophorectomy and reports she had a bladder injury at that time which required a low transverse incision and urologic bladder repair. She is status post appendectomy. She is status post cystoscopy with bladder polyp removal which was benign. She is status post prolapsed bladder surgery at which time she had a mesh and sling placed. This was done by Dr. Karen Ortiz in . She denies any prior colon stomach or liver surgery. Past Medical History:  
Diagnosis Date  Abdominal pain, unspecified site  Abdominal tenderness, epigastric  Abnormal liver enzymes   
 resolved per pt  Acute bronchitis   
 hx of  Arthritis  Benign neoplasm of skin, site unspecified  BMI less than 19,adult BMI 16.4  Claustrophobia  Fibromyalgia  Generalized anxiety disorder  Generalized hyperhidrosis  Headache  Hepatitis C   
 treated with no further complications  Lumbago  Migraine, unspecified, without mention of intractable migraine without mention of status migrainosus   
 managed with PRN medications  Motion sickness  Myalgia and myositis, unspecified  Osteoarthrosis, unspecified whether generalized or localized, unspecified site  Other and unspecified noninfectious gastroenteritis and colitis(558.9)  Other and unspecified nonspecific immunological findings  Personal history of tobacco use, presenting hazards to health  Scoliosis  Shortness of breath  Tobacco use disorder  Unspecified constipation Past Surgical History:  
Procedure Laterality Date  HX APPENDECTOMY  HX  SECTION    
 HX COLONOSCOPY    
 HX ENDOSCOPY    
 HX ORTHOPAEDIC Left   
 knee  HX PELVIC LAPAROSCOPY    
 multiple due to endometriosis  HX ESPERANZA AND BSO  HX TONSIL AND ADENOIDECTOMY  as a child  HX UROLOGICAL    
 bladder/ bladder/ bladder sling No current facility-administered medications for this encounter. Current Outpatient Medications Medication Sig  
 oxyCODONE IR (ROXICODONE) 10 mg tab immediate release tablet Take 1 Tab by mouth four (4) times daily as needed. Max Daily Amount: 40 mg.  
 fentaNYL (DURAGESIC) 75 mcg/hr 1 Patch by TransDERmal route every seventy-two (72) hours for 30 days. Max Daily Amount: 1 Patch.  LORazepam (ATIVAN) 1 mg tablet TAKE 1 TABLET BY MOUTH FOUR TIMES DAILY AS NEEDED FOR ANXIETY- MAXIMUM DAILY DOSE IS 4 TABLETS  
 SUMAtriptan (IMITREX) 50 mg tablet Take 1 Tab by mouth once as needed for Migraine for up to 1 dose. Indications: MIGRAINE  ascorbic acid, vitamin C, (VITAMIN C) 500 mg tablet Take 500 mg by mouth daily. Biaxin [clarithromycin]; Gluten; and Lactose Social History Socioeconomic History  Marital status:  Spouse name: Not on file  Number of children: Not on file  Years of education: Not on file  Highest education level: Not on file Social Needs  Financial resource strain: Not on file  Food insecurity - worry: Not on file  Food insecurity - inability: Not on file  Transportation needs - medical: Not on file  Transportation needs - non-medical: Not on file Occupational History  Not on file Tobacco Use  Smoking status: Current Every Day Smoker Packs/day: 1.00 Years: 35.00 Pack years: 35.00 Types: Cigarettes  Smokeless tobacco: Never Used Substance and Sexual Activity  Alcohol use: No  
  Alcohol/week: 0.0 oz  Drug use: No  
 Sexual activity: Not on file Other Topics Concern  Not on file Social History Narrative  Not on file Social History Tobacco Use Smoking Status Current Every Day Smoker  Packs/day: 1.00  Years: 35.00  Pack years: 35.00  Types: Cigarettes Smokeless Tobacco Never Used Family History Problem Relation Age of Onset Akash Arthritis-osteo Mother  Cancer Mother   
     breast  
 Heart Disease Mother  Hypertension Mother  Breast Cancer Mother 80 ROS: The patient has no difficulty with chest pain or shortness of breath. No fever or chills. Comprehensive review of systems was otherwise unremarkable except as noted above. Physical Exam:  
There were no vitals taken for this visit. There were no vitals filed for this visit. [unfilled] 
[unfilled] Constitutional: Alert, oriented, cooperative patient in no acute distress; appears stated age Eyes:Sclera are clear. EOMs intact ENMT: no external lesions gross hearing normal; no obvious neck masses, no ear or lip lesions, nares normal 
CV: RRR. Normal perfusion Resp: No JVD. Breathing is  non-labored; no audible wheezing. GI: soft and non-distended; multiple healed scars; thick dense quoc-umbilical texture Musculoskeletal: unremarkable with normal function. No embolic signs or cyanosis. Neuro:  Oriented; moves all 4; no focal deficits Psychiatric: normal affect and mood, no memory impairment Recent vitals (if inpt): 
@IPVITALS(24:)@ Labs: 
No results for input(s): WBC, HGB, PLT, NA, K, CL, CO2, BUN, CREA, GLU, PTP, INR, APTT, TBIL, TBILI, CBIL, SGOT, GPT, ALT, AP, AML, AML, LPSE, LCAD, NH4, TROPT, TROIQ, PCO2, PO2, HCO3, HGBEXT, PLTEXT, HGBEXT, PLTEXT in the last 72 hours. No lab exists for component:  PH, INREXT, INREXT Lab Results Component Value Date/Time WBC 6.4 10/16/2018 10:47 AM  
 HGB 14.2 10/16/2018 10:47 AM  
 PLATELET 438 17/15/6098 10:47 AM  
 Sodium 139 10/16/2018 01:19 PM  
 Potassium 3.5 10/16/2018 01:19 PM  
 Chloride 104 10/16/2018 01:19 PM  
 CO2 28 10/16/2018 01:19 PM  
 BUN 12 10/16/2018 01:19 PM  
 Creatinine 0.71 10/16/2018 01:19 PM  
 Glucose 63 (L) 10/16/2018 01:19 PM  
 INR 1.0 10/16/2018 01:19 PM  
 Bilirubin, total 0.7 10/16/2018 01:19 PM  
 AST (SGOT) 24 10/16/2018 01:19 PM  
 ALT (SGPT) 21 10/16/2018 01:19 PM  
 Alk. phosphatase 84 10/16/2018 01:19 PM  
 
 
 
I reviewed recent labs and recent radiologic studies. I independently reviewed radiology images for studies I described above or studies I have ordered. Admission date (for inpatients): (Not on file) [unfilled]  [unfilled] ASSESSMENT/PLAN: 
Problem List  Date Reviewed: 10/8/2018 Codes Class Noted [facility] ([facility] ICD-10-CM: H23.05 ICD-9-CM: 304.01  10/8/2018 CCC (chronic calculous cholecystitis) ICD-10-CM: K80.10 ICD-9-CM: 574.10  9/13/2018 RUQ pain ICD-10-CM: R10.11 ICD-9-CM: 789.01  9/13/2018 Choledocholithiasis s/p ERCP ICD-10-CM: K80.50 ICD-9-CM: 574.50  9/13/2018 Chronic hepatitis C without hepatic coma (HCC) ICD-10-CM: B18.2 ICD-9-CM: 070.54  8/14/2015 Constipation ICD-10-CM: K59.00 ICD-9-CM: 564.00  8/14/2015 Migraine ICD-10-CM: Z61.627 ICD-9-CM: 346.90  8/14/2015 Chronic pain ICD-10-CM: G89.29 ICD-9-CM: 338.29  8/14/2015 Osteoarthritis of multiple joints ICD-10-CM: M15.9 ICD-9-CM: 715.89  Unknown Fibromyalgia ICD-10-CM: M79.7 ICD-9-CM: 729.1  Unknown Generalized anxiety disorder ICD-10-CM: F41.1 ICD-9-CM: 300.02  Unknown Lumbago ICD-10-CM: M54.5 ICD-9-CM: 724.2  Unknown Tobacco use disorder ICD-10-CM: F17.200 ICD-9-CM: 305.1  Unknown Other and unspecified nonspecific immunological findings ICD-10-CM: R89.4 ICD-9-CM: 795.79  Unknown Hepatitis C ICD-10-CM: B19.20 ICD-9-CM: 070.70  2/25/2015 Portal hypertension (HCC) ICD-10-CM: K76.6 ICD-9-CM: 572.3  2/25/2015 Gallstones ICD-10-CM: K80.20 ICD-9-CM: 574.20  2/25/2015 Overview Signed 3/26/2015  2:03 PM by García Allen MD  
  Wt loss; Hep C 
2/4/15 US   
TECHNIQUE: Sonographic gray scale and Doppler images were obtained of the 
abdomen. FINDINGS: There are no discrete lesions in the visualized portions of the liver 
or spleen. The liver measures 18 cm and the spleen measures 14.5 cm. Limited visualization of the pancreas is unremarkable. The common bile duct diameter is 9 mm. The gallbladder is distended measuring 11 
cm in length, with 4-5 mm nonspecific wall thickening. There are multiple 
layering stones throughout the lumen. Sonographic Ghotra's sign negative 
according to the technologist. 
The right kidney measures 9.9 cm in length. The left kidney measures 10.5 cm. There is no hydronephrosis. A tiny round echogenic focus measuring less than 4 
mm is noted in the right upper kidney, likely vascular calcification rather than 
nonobstructing stone. In the left lateral kidney there is a 6 mm round echogenic 
lesion with no shadowing or Doppler flow, most compatible with a benign 
angiomyolipoma. Color Doppler demonstrates flow to both kidneys. There is no evidence of ascites. The partially visualized aorta is normal in caliber with atherosclerotic change 
noted. Main portal vein and IVC appear patent on Doppler imaging. IMPRESSION:  
1. Gallstones, gallbladder wall thickening, distended gallbladder, and biliary 
dilatation. 2. Splenomegaly. 3. Small left renal mass noted, most compatible with benign angiomyolipoma. 3/5/14 CT scan CT ABDOMEN AND PELVIS WITH CONTRAST. COMPARISON: None. Recent ultrasound is reviewed. FINDINGS:  
Abdomen: The lung bases are grossly clear. There are some bullous disease. The liver appears homogeneous. No enhancing lesions. No obvious changes of 
cirrhosis. The intrahepatic biliary tree is mildly dilated. The common bile duct 
measures 9 mm, borderline enlarged. No obstructing mass identified. Pancreas 
grossly unremarkable. Multiple small gallstones are present. The spleen enhances uniformly. No free 
fluid, acute inflammatory changes or adenopathy. Bowel loops are not dilated. The kidneys enhance uniformly. No radiopaque renal calculi. No hydronephrosis. The adrenal glands are normal size. Aorta is normal caliber. Pelvis: No free fluid or acute inflammatory changes. The urinary bladder is  
unremarkable. The uterus is absent. IMPRESSION: Cholelithiasis and mild biliary tree dilation, probably 
obstruction. ERCP should be considered. No focal liver lesion. Splenomegaly ICD-10-CM: R16.1 ICD-9-CM: 789.2  2/25/2015 Renal mass ICD-10-CM: N28.89 ICD-9-CM: 593.9  2/25/2015 Overview Addendum 2/25/2015 11:32 AM by Amber Quiroz MD  
  2/25/15 US shows small left renal mass most compatible with benign angiomyolipoma Pt told to follow-up with urology (she plans to see anyway regarding bladder prolapse) Epigastric pain ICD-10-CM: R10.13 ICD-9-CM: 789.06  2/25/2015 Active Problems: * No active hospital problems. * We had a lengthy discussion about the significant operative risks given her history. Of concern primarily is the fact she has had over 20 abdominal surgeries which were mostly GYN related and involved her lower abdomen. She denies any upper abdominal surgery including liver, colon, or gastric surgeries.   There is also the concern with her history of hepatitis C which she reports has been completely treated by GI and there is mention in the chart without any documentation of suspected cirrhosis. I do not see where any prior imaging or biopsy has shown this. If present, this will clearly increase her risks. I discussed this with her. I discussed the patient's condition with the patient at length and treatment options. I discussed risks of surgery (laparoscopic, possible open, cholecystectomy, possible liver biopsy) in language the patient could understand including bleeding, infection, need for further surgery, bile leak, injury to bowel or bile duct, abscess, fistula, SBO, blood clots, renal failure, respiratory failure, etc... The patient voiced understanding of all this and all questions were answered. Alternatives to surgery were discussed also and risks of the alternatives. The patient requested that we proceed with surgery. Informed consent was obtained. There is concern for cirrhosis and portal hypertension although her recent ultrasound identified no nodularity or liver echogenicity abnormality. Her albumin on 8/11/2017 was 4.9 with normal liver enzymes at that time and her platelet count was normal at 151k. These are the most recent labs we have. We will need to repeat her labs prior to surgery and I have ordered CBC, CMP, and coags. We will proceed with cholecystectomy and possible liver biopsy - 11/13/18.  
 
 
 
Signed:  Dmitry Almazan MD,  FACS

## 2018-11-13 NOTE — PERIOP NOTES
10:59 AM 
Dione called and notified that when pt arrived, her IV was leaking and the 1st mg dilaudid given may have leaked. Orders to give additional 2 mg IV dilaudid, max of 2 mg for new order, to be given in 0.2 mg doses. 11:24 AM 
Report to Jasmine Franz RN.

## 2018-11-21 PROBLEM — R10.11 RUQ PAIN: Status: RESOLVED | Noted: 2018-09-13 | Resolved: 2018-11-21

## 2018-11-21 PROBLEM — K74.69 OTHER CIRRHOSIS OF LIVER (HCC): Status: ACTIVE | Noted: 2018-11-21

## 2019-04-04 PROBLEM — K80.10 CCC (CHRONIC CALCULOUS CHOLECYSTITIS): Status: RESOLVED | Noted: 2018-09-13 | Resolved: 2019-04-04

## 2019-10-01 PROBLEM — K80.50 CHOLEDOCHOLITHIASIS: Status: RESOLVED | Noted: 2018-09-13 | Resolved: 2019-10-01

## 2019-10-01 PROBLEM — R74.8 ELEVATED LIVER ENZYMES: Status: RESOLVED | Noted: 2018-11-13 | Resolved: 2019-10-01

## 2020-10-13 ENCOUNTER — TRANSCRIBE ORDER (OUTPATIENT)
Dept: SCHEDULING | Age: 70
End: 2020-10-13

## 2020-10-13 DIAGNOSIS — Z12.31 VISIT FOR SCREENING MAMMOGRAM: Primary | ICD-10-CM

## 2020-12-15 ENCOUNTER — HOSPITAL ENCOUNTER (OUTPATIENT)
Dept: MAMMOGRAPHY | Age: 70
Discharge: HOME | End: 2020-12-15
Attending: FAMILY MEDICINE
Payer: MEDICARE

## 2020-12-15 DIAGNOSIS — Z12.31 VISIT FOR SCREENING MAMMOGRAM: ICD-10-CM

## 2020-12-15 PROCEDURE — 77080 DXA BONE DENSITY AXIAL: CPT

## 2020-12-15 PROCEDURE — 77063 BREAST TOMOSYNTHESIS BI: CPT

## 2021-04-24 ENCOUNTER — APPOINTMENT (OUTPATIENT)
Dept: CT IMAGING | Age: 71
DRG: 871 | End: 2021-04-24
Attending: EMERGENCY MEDICINE
Payer: MEDICARE

## 2021-04-24 ENCOUNTER — HOSPITAL ENCOUNTER (INPATIENT)
Age: 71
LOS: 1 days | Discharge: HOME | DRG: 871 | End: 2021-04-26
Attending: EMERGENCY MEDICINE | Admitting: HOSPITALIST
Payer: MEDICARE

## 2021-04-24 DIAGNOSIS — R10.84 ABDOMINAL PAIN, GENERALIZED: ICD-10-CM

## 2021-04-24 DIAGNOSIS — R16.0 LIVER MASS: Primary | ICD-10-CM

## 2021-04-24 LAB
ALBUMIN SERPL-MCNC: 2.2 G/DL (ref 3.2–4.6)
ALBUMIN/GLOB SERPL: 0.5 {RATIO} (ref 1.2–3.5)
ALP SERPL-CCNC: 434 U/L (ref 50–136)
ALT SERPL-CCNC: 85 U/L (ref 12–65)
AMMONIA PLAS-SCNC: <10 UMOL/L (ref 11–32)
ANION GAP SERPL CALC-SCNC: 5 MMOL/L (ref 7–16)
AST SERPL-CCNC: 150 U/L (ref 15–37)
BILIRUB SERPL-MCNC: 9.7 MG/DL (ref 0.2–1.1)
BUN SERPL-MCNC: 19 MG/DL (ref 8–23)
CALCIUM SERPL-MCNC: 8.4 MG/DL (ref 8.3–10.4)
CHLORIDE SERPL-SCNC: 102 MMOL/L (ref 98–107)
CO2 SERPL-SCNC: 27 MMOL/L (ref 21–32)
CREAT SERPL-MCNC: 0.62 MG/DL (ref 0.6–1)
DIFFERENTIAL METHOD BLD: ABNORMAL
ERYTHROCYTE [DISTWIDTH] IN BLOOD BY AUTOMATED COUNT: 14.4 % (ref 11.9–14.6)
GLOBULIN SER CALC-MCNC: 4.4 G/DL (ref 2.3–3.5)
GLUCOSE SERPL-MCNC: 117 MG/DL (ref 65–100)
HCT VFR BLD AUTO: 48.8 % (ref 35.8–46.3)
HGB BLD-MCNC: 17 G/DL (ref 11.7–15.4)
LACTATE SERPL-SCNC: 2 MMOL/L (ref 0.4–2)
LIPASE SERPL-CCNC: 645 U/L (ref 73–393)
LYMPHOCYTES # BLD: 0.3 K/UL (ref 0.5–4.6)
LYMPHOCYTES NFR BLD MANUAL: 2 % (ref 16–44)
MAGNESIUM SERPL-MCNC: 2.4 MG/DL (ref 1.8–2.4)
MCH RBC QN AUTO: 31.7 PG (ref 26.1–32.9)
MCHC RBC AUTO-ENTMCNC: 34.8 G/DL (ref 31.4–35)
MCV RBC AUTO: 91 FL (ref 79.6–97.8)
MONOCYTES # BLD: 0.5 K/UL (ref 0.1–1.3)
MONOCYTES NFR BLD MANUAL: 3 % (ref 3–9)
NEUTS BAND NFR BLD MANUAL: 4 % (ref 0–10)
NEUTS SEG # BLD: 14.8 K/UL (ref 1.7–8.2)
NEUTS SEG NFR BLD MANUAL: 91 % (ref 47–75)
NRBC # BLD: 0 K/UL (ref 0–0.2)
PLATELET # BLD AUTO: 294 K/UL (ref 150–450)
PLATELET COMMENTS,PCOM: ADEQUATE
PMV BLD AUTO: 12 FL (ref 9.4–12.3)
POTASSIUM SERPL-SCNC: 5.8 MMOL/L (ref 3.5–5.1)
PROCALCITONIN SERPL-MCNC: 2.67 NG/ML
PROT SERPL-MCNC: 6.6 G/DL (ref 6.3–8.2)
RBC # BLD AUTO: 5.36 M/UL (ref 4.05–5.2)
RBC MORPH BLD: ABNORMAL
SODIUM SERPL-SCNC: 134 MMOL/L (ref 136–145)
WBC # BLD AUTO: 15.6 K/UL (ref 4.3–11.1)
WBC MORPH BLD: ABNORMAL

## 2021-04-24 PROCEDURE — 74011250636 HC RX REV CODE- 250/636: Performed by: EMERGENCY MEDICINE

## 2021-04-24 PROCEDURE — 84145 PROCALCITONIN (PCT): CPT

## 2021-04-24 PROCEDURE — 96376 TX/PRO/DX INJ SAME DRUG ADON: CPT

## 2021-04-24 PROCEDURE — 74011000258 HC RX REV CODE- 258: Performed by: EMERGENCY MEDICINE

## 2021-04-24 PROCEDURE — 96375 TX/PRO/DX INJ NEW DRUG ADDON: CPT

## 2021-04-24 PROCEDURE — 83690 ASSAY OF LIPASE: CPT

## 2021-04-24 PROCEDURE — 74011000636 HC RX REV CODE- 636: Performed by: EMERGENCY MEDICINE

## 2021-04-24 PROCEDURE — 74011000250 HC RX REV CODE- 250: Performed by: EMERGENCY MEDICINE

## 2021-04-24 PROCEDURE — 83735 ASSAY OF MAGNESIUM: CPT

## 2021-04-24 PROCEDURE — 85025 COMPLETE CBC W/AUTO DIFF WBC: CPT

## 2021-04-24 PROCEDURE — 85610 PROTHROMBIN TIME: CPT

## 2021-04-24 PROCEDURE — 83605 ASSAY OF LACTIC ACID: CPT

## 2021-04-24 PROCEDURE — 96365 THER/PROPH/DIAG IV INF INIT: CPT

## 2021-04-24 PROCEDURE — 74177 CT ABD & PELVIS W/CONTRAST: CPT

## 2021-04-24 PROCEDURE — 99284 EMERGENCY DEPT VISIT MOD MDM: CPT

## 2021-04-24 PROCEDURE — 82140 ASSAY OF AMMONIA: CPT

## 2021-04-24 PROCEDURE — 80053 COMPREHEN METABOLIC PANEL: CPT

## 2021-04-24 RX ORDER — SODIUM CHLORIDE 0.9 % (FLUSH) 0.9 %
10 SYRINGE (ML) INJECTION
Status: COMPLETED | OUTPATIENT
Start: 2021-04-24 | End: 2021-04-24

## 2021-04-24 RX ORDER — HYDROMORPHONE HYDROCHLORIDE 1 MG/ML
1 INJECTION, SOLUTION INTRAMUSCULAR; INTRAVENOUS; SUBCUTANEOUS ONCE
Status: DISCONTINUED | OUTPATIENT
Start: 2021-04-24 | End: 2021-04-24 | Stop reason: CLARIF

## 2021-04-24 RX ORDER — HYDROMORPHONE HYDROCHLORIDE 1 MG/ML
1 INJECTION, SOLUTION INTRAMUSCULAR; INTRAVENOUS; SUBCUTANEOUS ONCE
Status: COMPLETED | OUTPATIENT
Start: 2021-04-24 | End: 2021-04-24

## 2021-04-24 RX ORDER — ONDANSETRON 2 MG/ML
4 INJECTION INTRAMUSCULAR; INTRAVENOUS
Status: COMPLETED | OUTPATIENT
Start: 2021-04-24 | End: 2021-04-24

## 2021-04-24 RX ORDER — LORAZEPAM 2 MG/ML
1 INJECTION INTRAMUSCULAR
Status: COMPLETED | OUTPATIENT
Start: 2021-04-24 | End: 2021-04-24

## 2021-04-24 RX ORDER — MORPHINE SULFATE 4 MG/ML
4 INJECTION INTRAVENOUS
Status: DISCONTINUED | OUTPATIENT
Start: 2021-04-24 | End: 2021-04-24

## 2021-04-24 RX ADMIN — SODIUM CHLORIDE 1000 ML: 900 INJECTION, SOLUTION INTRAVENOUS at 20:33

## 2021-04-24 RX ADMIN — Medication 10 ML: at 23:42

## 2021-04-24 RX ADMIN — HYDROMORPHONE HYDROCHLORIDE 1 MG: 1 INJECTION, SOLUTION INTRAMUSCULAR; INTRAVENOUS; SUBCUTANEOUS at 20:58

## 2021-04-24 RX ADMIN — PROMETHAZINE HYDROCHLORIDE 12.5 MG: 25 INJECTION INTRAMUSCULAR; INTRAVENOUS at 21:55

## 2021-04-24 RX ADMIN — SODIUM CHLORIDE 100 ML: 900 INJECTION, SOLUTION INTRAVENOUS at 23:42

## 2021-04-24 RX ADMIN — ONDANSETRON 4 MG: 2 INJECTION INTRAMUSCULAR; INTRAVENOUS at 20:30

## 2021-04-24 RX ADMIN — DIATRIZOATE MEGLUMINE AND DIATRIZOATE SODIUM 15 ML: 660; 100 LIQUID ORAL; RECTAL at 21:56

## 2021-04-24 RX ADMIN — LORAZEPAM 1 MG: 2 INJECTION INTRAMUSCULAR; INTRAVENOUS at 20:58

## 2021-04-24 RX ADMIN — HYDROMORPHONE HYDROCHLORIDE 1 MG: 1 INJECTION, SOLUTION INTRAMUSCULAR; INTRAVENOUS; SUBCUTANEOUS at 20:30

## 2021-04-24 RX ADMIN — SODIUM CHLORIDE 1000 ML: 900 INJECTION, SOLUTION INTRAVENOUS at 21:55

## 2021-04-24 RX ADMIN — HYDROMORPHONE HYDROCHLORIDE 1 MG: 1 INJECTION, SOLUTION INTRAMUSCULAR; INTRAVENOUS; SUBCUTANEOUS at 21:56

## 2021-04-24 RX ADMIN — PIPERACILLIN SODIUM AND TAZOBACTAM SODIUM 4.5 G: 4; .5 INJECTION, POWDER, LYOPHILIZED, FOR SOLUTION INTRAVENOUS at 23:57

## 2021-04-24 RX ADMIN — IOPAMIDOL 80 ML: 755 INJECTION, SOLUTION INTRAVENOUS at 23:42

## 2021-04-25 ENCOUNTER — ANESTHESIA (OUTPATIENT)
Dept: ENDOSCOPY | Age: 71
DRG: 871 | End: 2021-04-25
Payer: MEDICARE

## 2021-04-25 ENCOUNTER — ANESTHESIA EVENT (OUTPATIENT)
Dept: ENDOSCOPY | Age: 71
DRG: 871 | End: 2021-04-25
Payer: MEDICARE

## 2021-04-25 ENCOUNTER — APPOINTMENT (OUTPATIENT)
Dept: GENERAL RADIOLOGY | Age: 71
DRG: 871 | End: 2021-04-25
Attending: INTERNAL MEDICINE
Payer: MEDICARE

## 2021-04-25 PROBLEM — K76.9 LIVER LESION, RIGHT LOBE: Status: ACTIVE | Noted: 2021-04-25

## 2021-04-25 PROBLEM — R11.2 NAUSEA AND VOMITING: Status: RESOLVED | Noted: 2021-04-25 | Resolved: 2021-04-25

## 2021-04-25 PROBLEM — R11.2 NAUSEA AND VOMITING: Status: ACTIVE | Noted: 2021-04-25

## 2021-04-25 PROBLEM — R63.4 RECENT UNINTENTIONAL WEIGHT LOSS OVER SEVERAL MONTHS: Status: ACTIVE | Noted: 2021-04-25

## 2021-04-25 PROBLEM — K83.1 BILIARY OBSTRUCTION: Status: ACTIVE | Noted: 2021-04-25

## 2021-04-25 PROBLEM — R18.8 CIRRHOSIS OF LIVER WITH ASCITES (HCC): Status: ACTIVE | Noted: 2018-11-21

## 2021-04-25 PROBLEM — R10.9 ABDOMINAL PAIN: Status: ACTIVE | Noted: 2021-04-25

## 2021-04-25 PROBLEM — K74.60 CIRRHOSIS OF LIVER WITH ASCITES (HCC): Status: ACTIVE | Noted: 2018-11-21

## 2021-04-25 LAB
ALBUMIN SERPL-MCNC: 2.2 G/DL (ref 3.2–4.6)
ALBUMIN/GLOB SERPL: 0.5 {RATIO} (ref 1.2–3.5)
ALP SERPL-CCNC: 420 U/L (ref 50–136)
ALT SERPL-CCNC: 75 U/L (ref 12–65)
ANION GAP SERPL CALC-SCNC: 11 MMOL/L (ref 7–16)
APPEARANCE UR: CLEAR
AST SERPL-CCNC: 87 U/L (ref 15–37)
BACTERIA URNS QL MICRO: 0 /HPF
BILIRUB SERPL-MCNC: 7.7 MG/DL (ref 0.2–1.1)
BILIRUB UR QL: ABNORMAL
BUN SERPL-MCNC: 15 MG/DL (ref 8–23)
CALCIUM SERPL-MCNC: 9 MG/DL (ref 8.3–10.4)
CASTS URNS QL MICRO: 0 /LPF
CHLORIDE SERPL-SCNC: 107 MMOL/L (ref 98–107)
CO2 SERPL-SCNC: 23 MMOL/L (ref 21–32)
COLOR UR: ABNORMAL
CREAT SERPL-MCNC: 0.43 MG/DL (ref 0.6–1)
CRYSTALS URNS QL MICRO: 0 /LPF
EPI CELLS #/AREA URNS HPF: NORMAL /HPF
GLOBULIN SER CALC-MCNC: 4.3 G/DL (ref 2.3–3.5)
GLUCOSE SERPL-MCNC: 123 MG/DL (ref 65–100)
GLUCOSE UR STRIP.AUTO-MCNC: NEGATIVE MG/DL
HGB UR QL STRIP: ABNORMAL
INR PPP: 1.1
KETONES UR QL STRIP.AUTO: NEGATIVE MG/DL
LEUKOCYTE ESTERASE UR QL STRIP.AUTO: ABNORMAL
LIPASE SERPL-CCNC: 477 U/L (ref 73–393)
MUCOUS THREADS URNS QL MICRO: 0 /LPF
NITRITE UR QL STRIP.AUTO: NEGATIVE
OTHER OBSERVATIONS,UCOM: NORMAL
PH UR STRIP: 6.5 [PH] (ref 5–9)
POTASSIUM SERPL-SCNC: 3.2 MMOL/L (ref 3.5–5.1)
PROT SERPL-MCNC: 6.5 G/DL (ref 6.3–8.2)
PROT UR STRIP-MCNC: NEGATIVE MG/DL
PROTHROMBIN TIME: 14.2 SEC (ref 12.5–14.7)
RBC #/AREA URNS HPF: 0 /HPF
SODIUM SERPL-SCNC: 141 MMOL/L (ref 136–145)
SP GR UR REFRACTOMETRY: <1.005 (ref 1–1.02)
UROBILINOGEN UR QL STRIP.AUTO: 0.2 EU/DL (ref 0.2–1)
WBC URNS QL MICRO: NORMAL /HPF

## 2021-04-25 PROCEDURE — C1769 GUIDE WIRE: HCPCS | Performed by: INTERNAL MEDICINE

## 2021-04-25 PROCEDURE — 0FC98ZZ EXTIRPATION OF MATTER FROM COMMON BILE DUCT, VIA NATURAL OR ARTIFICIAL OPENING ENDOSCOPIC: ICD-10-PCS | Performed by: INTERNAL MEDICINE

## 2021-04-25 PROCEDURE — 65270000029 HC RM PRIVATE

## 2021-04-25 PROCEDURE — 74011250636 HC RX REV CODE- 250/636: Performed by: ANESTHESIOLOGY

## 2021-04-25 PROCEDURE — 77030019908 HC STETH ESOPH SIMS -A: Performed by: STUDENT IN AN ORGANIZED HEALTH CARE EDUCATION/TRAINING PROGRAM

## 2021-04-25 PROCEDURE — 81003 URINALYSIS AUTO W/O SCOPE: CPT

## 2021-04-25 PROCEDURE — 76040000025: Performed by: INTERNAL MEDICINE

## 2021-04-25 PROCEDURE — 94761 N-INVAS EAR/PLS OXIMETRY MLT: CPT

## 2021-04-25 PROCEDURE — 74011250637 HC RX REV CODE- 250/637: Performed by: HOSPITALIST

## 2021-04-25 PROCEDURE — 74011000258 HC RX REV CODE- 258: Performed by: HOSPITALIST

## 2021-04-25 PROCEDURE — 87040 BLOOD CULTURE FOR BACTERIA: CPT

## 2021-04-25 PROCEDURE — 77030040361 HC SLV COMPR DVT MDII -B: Performed by: INTERNAL MEDICINE

## 2021-04-25 PROCEDURE — 77030037088 HC TUBE ENDOTRACH ORAL NSL COVD-A: Performed by: STUDENT IN AN ORGANIZED HEALTH CARE EDUCATION/TRAINING PROGRAM

## 2021-04-25 PROCEDURE — 77010033678 HC OXYGEN DAILY

## 2021-04-25 PROCEDURE — 2709999900 HC NON-CHARGEABLE SUPPLY

## 2021-04-25 PROCEDURE — 2709999900 HC NON-CHARGEABLE SUPPLY: Performed by: INTERNAL MEDICINE

## 2021-04-25 PROCEDURE — 74011250636 HC RX REV CODE- 250/636: Performed by: EMERGENCY MEDICINE

## 2021-04-25 PROCEDURE — 36415 COLL VENOUS BLD VENIPUNCTURE: CPT

## 2021-04-25 PROCEDURE — 74011250636 HC RX REV CODE- 250/636: Performed by: HOSPITALIST

## 2021-04-25 PROCEDURE — 77030039425 HC BLD LARYNG TRULITE DISP TELE -A: Performed by: STUDENT IN AN ORGANIZED HEALTH CARE EDUCATION/TRAINING PROGRAM

## 2021-04-25 PROCEDURE — 81015 MICROSCOPIC EXAM OF URINE: CPT

## 2021-04-25 PROCEDURE — 76060000032 HC ANESTHESIA 0.5 TO 1 HR: Performed by: INTERNAL MEDICINE

## 2021-04-25 PROCEDURE — 74330 X-RAY BILE/PANC ENDOSCOPY: CPT

## 2021-04-25 PROCEDURE — 74011250636 HC RX REV CODE- 250/636: Performed by: STUDENT IN AN ORGANIZED HEALTH CARE EDUCATION/TRAINING PROGRAM

## 2021-04-25 PROCEDURE — 77030009038 HC CATH BILI STN RTVR BSC -C: Performed by: INTERNAL MEDICINE

## 2021-04-25 PROCEDURE — 74011000636 HC RX REV CODE- 636: Performed by: INTERNAL MEDICINE

## 2021-04-25 PROCEDURE — 80053 COMPREHEN METABOLIC PANEL: CPT

## 2021-04-25 PROCEDURE — 74011000250 HC RX REV CODE- 250: Performed by: STUDENT IN AN ORGANIZED HEALTH CARE EDUCATION/TRAINING PROGRAM

## 2021-04-25 PROCEDURE — 82105 ALPHA-FETOPROTEIN SERUM: CPT

## 2021-04-25 PROCEDURE — 83690 ASSAY OF LIPASE: CPT

## 2021-04-25 PROCEDURE — 77030007288 HC DEV LOK BILI BSC -A: Performed by: INTERNAL MEDICINE

## 2021-04-25 RX ORDER — MIDAZOLAM HYDROCHLORIDE 1 MG/ML
2 INJECTION, SOLUTION INTRAMUSCULAR; INTRAVENOUS
Status: ACTIVE | OUTPATIENT
Start: 2021-04-25 | End: 2021-04-26

## 2021-04-25 RX ORDER — SODIUM CHLORIDE 0.9 % (FLUSH) 0.9 %
5-40 SYRINGE (ML) INJECTION EVERY 8 HOURS
Status: DISCONTINUED | OUTPATIENT
Start: 2021-04-25 | End: 2021-04-26 | Stop reason: HOSPADM

## 2021-04-25 RX ORDER — PROPOFOL 10 MG/ML
INJECTION, EMULSION INTRAVENOUS AS NEEDED
Status: DISCONTINUED | OUTPATIENT
Start: 2021-04-25 | End: 2021-04-25 | Stop reason: HOSPADM

## 2021-04-25 RX ORDER — ACETAMINOPHEN 325 MG/1
650 TABLET ORAL
Status: DISCONTINUED | OUTPATIENT
Start: 2021-04-25 | End: 2021-04-26 | Stop reason: HOSPADM

## 2021-04-25 RX ORDER — FENTANYL CITRATE 50 UG/ML
100 INJECTION, SOLUTION INTRAMUSCULAR; INTRAVENOUS ONCE
Status: ACTIVE | OUTPATIENT
Start: 2021-04-25 | End: 2021-04-25

## 2021-04-25 RX ORDER — ACETAMINOPHEN 650 MG/1
650 SUPPOSITORY RECTAL
Status: DISCONTINUED | OUTPATIENT
Start: 2021-04-25 | End: 2021-04-26 | Stop reason: HOSPADM

## 2021-04-25 RX ORDER — SODIUM POLYSTYRENE SULFONATE 15 G/60ML
45 SUSPENSION ORAL; RECTAL
Status: COMPLETED | OUTPATIENT
Start: 2021-04-25 | End: 2021-04-25

## 2021-04-25 RX ORDER — NALOXONE HYDROCHLORIDE 0.4 MG/ML
0.04 INJECTION, SOLUTION INTRAMUSCULAR; INTRAVENOUS; SUBCUTANEOUS
Status: DISCONTINUED | OUTPATIENT
Start: 2021-04-25 | End: 2021-04-26 | Stop reason: HOSPADM

## 2021-04-25 RX ORDER — SODIUM CHLORIDE, SODIUM LACTATE, POTASSIUM CHLORIDE, CALCIUM CHLORIDE 600; 310; 30; 20 MG/100ML; MG/100ML; MG/100ML; MG/100ML
100 INJECTION, SOLUTION INTRAVENOUS CONTINUOUS
Status: DISCONTINUED | OUTPATIENT
Start: 2021-04-25 | End: 2021-04-25

## 2021-04-25 RX ORDER — ROCURONIUM BROMIDE 10 MG/ML
INJECTION, SOLUTION INTRAVENOUS AS NEEDED
Status: DISCONTINUED | OUTPATIENT
Start: 2021-04-25 | End: 2021-04-25 | Stop reason: HOSPADM

## 2021-04-25 RX ORDER — ONDANSETRON 2 MG/ML
4 INJECTION INTRAMUSCULAR; INTRAVENOUS
Status: DISCONTINUED | OUTPATIENT
Start: 2021-04-25 | End: 2021-04-25 | Stop reason: SDUPTHER

## 2021-04-25 RX ORDER — SODIUM CHLORIDE, SODIUM LACTATE, POTASSIUM CHLORIDE, CALCIUM CHLORIDE 600; 310; 30; 20 MG/100ML; MG/100ML; MG/100ML; MG/100ML
INJECTION, SOLUTION INTRAVENOUS
Status: DISCONTINUED | OUTPATIENT
Start: 2021-04-25 | End: 2021-04-25 | Stop reason: HOSPADM

## 2021-04-25 RX ORDER — MORPHINE SULFATE 2 MG/ML
2 INJECTION, SOLUTION INTRAMUSCULAR; INTRAVENOUS
Status: DISCONTINUED | OUTPATIENT
Start: 2021-04-25 | End: 2021-04-26 | Stop reason: HOSPADM

## 2021-04-25 RX ORDER — LIDOCAINE HYDROCHLORIDE 10 MG/ML
0.1 INJECTION INFILTRATION; PERINEURAL AS NEEDED
Status: DISCONTINUED | OUTPATIENT
Start: 2021-04-25 | End: 2021-04-26 | Stop reason: HOSPADM

## 2021-04-25 RX ORDER — SODIUM CHLORIDE 9 MG/ML
75 INJECTION, SOLUTION INTRAVENOUS CONTINUOUS
Status: DISCONTINUED | OUTPATIENT
Start: 2021-04-25 | End: 2021-04-25

## 2021-04-25 RX ORDER — DEXAMETHASONE SODIUM PHOSPHATE 4 MG/ML
INJECTION, SOLUTION INTRA-ARTICULAR; INTRALESIONAL; INTRAMUSCULAR; INTRAVENOUS; SOFT TISSUE AS NEEDED
Status: DISCONTINUED | OUTPATIENT
Start: 2021-04-25 | End: 2021-04-25 | Stop reason: HOSPADM

## 2021-04-25 RX ORDER — HYDROMORPHONE HYDROCHLORIDE 1 MG/ML
1 INJECTION, SOLUTION INTRAMUSCULAR; INTRAVENOUS; SUBCUTANEOUS ONCE
Status: COMPLETED | OUTPATIENT
Start: 2021-04-25 | End: 2021-04-25

## 2021-04-25 RX ORDER — SODIUM CHLORIDE 0.9 % (FLUSH) 0.9 %
5-40 SYRINGE (ML) INJECTION AS NEEDED
Status: DISCONTINUED | OUTPATIENT
Start: 2021-04-25 | End: 2021-04-26 | Stop reason: HOSPADM

## 2021-04-25 RX ORDER — MIDAZOLAM HYDROCHLORIDE 1 MG/ML
2 INJECTION, SOLUTION INTRAMUSCULAR; INTRAVENOUS ONCE
Status: ACTIVE | OUTPATIENT
Start: 2021-04-25 | End: 2021-04-25

## 2021-04-25 RX ORDER — ONDANSETRON 2 MG/ML
4 INJECTION INTRAMUSCULAR; INTRAVENOUS
Status: DISCONTINUED | OUTPATIENT
Start: 2021-04-25 | End: 2021-04-26 | Stop reason: HOSPADM

## 2021-04-25 RX ORDER — IBUPROFEN 200 MG
1 TABLET ORAL
Status: DISCONTINUED | OUTPATIENT
Start: 2021-04-25 | End: 2021-04-26 | Stop reason: HOSPADM

## 2021-04-25 RX ORDER — SODIUM CHLORIDE 9 MG/ML
50 INJECTION, SOLUTION INTRAVENOUS CONTINUOUS
Status: DISPENSED | OUTPATIENT
Start: 2021-04-25 | End: 2021-04-25

## 2021-04-25 RX ORDER — LORAZEPAM 0.5 MG/1
0.5 TABLET ORAL
Status: DISCONTINUED | OUTPATIENT
Start: 2021-04-25 | End: 2021-04-26 | Stop reason: HOSPADM

## 2021-04-25 RX ORDER — SUCCINYLCHOLINE CHLORIDE 20 MG/ML
INJECTION INTRAMUSCULAR; INTRAVENOUS AS NEEDED
Status: DISCONTINUED | OUTPATIENT
Start: 2021-04-25 | End: 2021-04-25 | Stop reason: HOSPADM

## 2021-04-25 RX ORDER — SENNOSIDES 8.6 MG/1
2 TABLET ORAL
Status: DISCONTINUED | OUTPATIENT
Start: 2021-04-25 | End: 2021-04-26 | Stop reason: HOSPADM

## 2021-04-25 RX ORDER — OXYCODONE HYDROCHLORIDE 5 MG/1
5 TABLET ORAL
Status: ACTIVE | OUTPATIENT
Start: 2021-04-25 | End: 2021-04-26

## 2021-04-25 RX ORDER — ONDANSETRON 2 MG/ML
INJECTION INTRAMUSCULAR; INTRAVENOUS AS NEEDED
Status: DISCONTINUED | OUTPATIENT
Start: 2021-04-25 | End: 2021-04-25 | Stop reason: HOSPADM

## 2021-04-25 RX ORDER — LIDOCAINE HYDROCHLORIDE 20 MG/ML
INJECTION, SOLUTION EPIDURAL; INFILTRATION; INTRACAUDAL; PERINEURAL AS NEEDED
Status: DISCONTINUED | OUTPATIENT
Start: 2021-04-25 | End: 2021-04-25 | Stop reason: HOSPADM

## 2021-04-25 RX ORDER — HYDROMORPHONE HYDROCHLORIDE 1 MG/ML
0.2 INJECTION, SOLUTION INTRAMUSCULAR; INTRAVENOUS; SUBCUTANEOUS
Status: COMPLETED | OUTPATIENT
Start: 2021-04-25 | End: 2021-04-25

## 2021-04-25 RX ADMIN — Medication 10 ML: at 13:53

## 2021-04-25 RX ADMIN — MORPHINE SULFATE 2 MG: 2 INJECTION, SOLUTION INTRAMUSCULAR; INTRAVENOUS at 08:50

## 2021-04-25 RX ADMIN — MORPHINE SULFATE 2 MG: 2 INJECTION, SOLUTION INTRAMUSCULAR; INTRAVENOUS at 03:11

## 2021-04-25 RX ADMIN — IOPAMIDOL 50 ML: 755 INJECTION, SOLUTION INTRAVENOUS at 12:21

## 2021-04-25 RX ADMIN — LORAZEPAM 0.5 MG: 0.5 TABLET ORAL at 23:42

## 2021-04-25 RX ADMIN — ROCURONIUM BROMIDE 5 MG: 10 INJECTION, SOLUTION INTRAVENOUS at 12:04

## 2021-04-25 RX ADMIN — CEFTRIAXONE 2 G: 2 INJECTION, POWDER, FOR SOLUTION INTRAMUSCULAR; INTRAVENOUS at 03:14

## 2021-04-25 RX ADMIN — MORPHINE SULFATE 2 MG: 2 INJECTION, SOLUTION INTRAMUSCULAR; INTRAVENOUS at 16:04

## 2021-04-25 RX ADMIN — SODIUM CHLORIDE, SODIUM LACTATE, POTASSIUM CHLORIDE, AND CALCIUM CHLORIDE 100 ML/HR: 600; 310; 30; 20 INJECTION, SOLUTION INTRAVENOUS at 10:15

## 2021-04-25 RX ADMIN — MORPHINE SULFATE 2 MG: 2 INJECTION, SOLUTION INTRAMUSCULAR; INTRAVENOUS at 20:25

## 2021-04-25 RX ADMIN — LORAZEPAM 0.5 MG: 0.5 TABLET ORAL at 17:20

## 2021-04-25 RX ADMIN — HYDROMORPHONE HYDROCHLORIDE 0.2 MG: 1 INJECTION, SOLUTION INTRAMUSCULAR; INTRAVENOUS; SUBCUTANEOUS at 13:10

## 2021-04-25 RX ADMIN — HYDROMORPHONE HYDROCHLORIDE 1 MG: 1 INJECTION, SOLUTION INTRAMUSCULAR; INTRAVENOUS; SUBCUTANEOUS at 00:55

## 2021-04-25 RX ADMIN — SODIUM CHLORIDE 50 ML/HR: 900 INJECTION, SOLUTION INTRAVENOUS at 02:48

## 2021-04-25 RX ADMIN — SODIUM CHLORIDE, SODIUM LACTATE, POTASSIUM CHLORIDE, AND CALCIUM CHLORIDE: 600; 310; 30; 20 INJECTION, SOLUTION INTRAVENOUS at 11:58

## 2021-04-25 RX ADMIN — DEXAMETHASONE SODIUM PHOSPHATE 4 MG: 4 INJECTION, SOLUTION INTRAMUSCULAR; INTRAVENOUS at 12:10

## 2021-04-25 RX ADMIN — SODIUM POLYSTYRENE SULFONATE 45 G: 15 SUSPENSION ORAL; RECTAL at 02:24

## 2021-04-25 RX ADMIN — HYDROMORPHONE HYDROCHLORIDE 0.2 MG: 1 INJECTION, SOLUTION INTRAMUSCULAR; INTRAVENOUS; SUBCUTANEOUS at 13:05

## 2021-04-25 RX ADMIN — PROPOFOL 120 MG: 10 INJECTION, EMULSION INTRAVENOUS at 12:04

## 2021-04-25 RX ADMIN — HYDROMORPHONE HYDROCHLORIDE 0.2 MG: 1 INJECTION, SOLUTION INTRAMUSCULAR; INTRAVENOUS; SUBCUTANEOUS at 12:55

## 2021-04-25 RX ADMIN — HYDROMORPHONE HYDROCHLORIDE 0.2 MG: 1 INJECTION, SOLUTION INTRAMUSCULAR; INTRAVENOUS; SUBCUTANEOUS at 13:00

## 2021-04-25 RX ADMIN — LIDOCAINE HYDROCHLORIDE 60 MG: 20 INJECTION, SOLUTION EPIDURAL; INFILTRATION; INTRACAUDAL; PERINEURAL at 12:04

## 2021-04-25 RX ADMIN — ONDANSETRON 4 MG: 2 INJECTION INTRAMUSCULAR; INTRAVENOUS at 12:10

## 2021-04-25 RX ADMIN — Medication 10 ML: at 21:15

## 2021-04-25 RX ADMIN — SUCCINYLCHOLINE CHLORIDE 100 MG: 20 INJECTION, SOLUTION INTRAMUSCULAR; INTRAVENOUS at 12:05

## 2021-04-25 NOTE — PROGRESS NOTES
END OF SHIFT NOTE:    INTAKE/OUTPUT  04/24 0701 - 04/25 0700  In: 153 [I.V.:153]  Out: -   Voiding: YES  Catheter: NO  Drain:              Flatus: Patient does have flatus present. Stool:  0 occurrences. Characteristics:       Emesis: 0 occurrences. Characteristics:        VITAL SIGNS  Patient Vitals for the past 12 hrs:   Temp Pulse Resp BP SpO2   04/25/21 1607 97.4 °F (36.3 °C) 89 18 (!) 143/80 96 %   04/25/21 1352 98.1 °F (36.7 °C) 80 17 (!) 145/85 96 %   04/25/21 1330  81 16 136/86 100 %   04/25/21 1314  78 16 135/82 100 %   04/25/21 1304  79 16 128/82 100 %   04/25/21 1259  82 16 123/80 100 %   04/25/21 1254  82 14 120/76 100 %   04/25/21 1244  89 14 121/77 100 %   04/25/21 1239  91 14 112/71 99 %   04/25/21 1234 97.7 °F (36.5 °C) 89 10 129/82 100 %   04/25/21 1009 98.6 °F (37 °C) (!) 103 16 139/82 94 %   04/25/21 0716 98.2 °F (36.8 °C) (!) 111 18 117/86 93 %       Pain Assessment  Pain Intensity 1: 5 (04/25/21 1700)  Pain Location 1: Abdomen  Pain Intervention(s) 1: Medication (see MAR)  Patient Stated Pain Goal: 3    Ambulating  Yes    Shift report given to oncoming nurse at the bedside.     Heather Bhagat, RN

## 2021-04-25 NOTE — PROGRESS NOTES
TRANSFER - IN REPORT:    Verbal report received from Sujatha Stringer RN(name) on [facility]  being received from room 215(unit) for ordered procedure      Report consisted of patients Situation, Background, Assessment and   Recommendations(SBAR). Information from the following report(s) SBAR and MAR was reviewed with the receiving nurse. Opportunity for questions and clarification was provided. Assessment completed upon patients arrival to unit and care assumed.

## 2021-04-25 NOTE — PROCEDURES
ENDOSCOPIC  RETROGRADE CHOLANGIOPANCREATOGRAPHY    DATE of PROCEDURE: 4/25/2021    PT NAME: Vannessa Peña     [id number]    MEDICATION: general    INSTRUMENT:  [id number]    SPECIAL PROCEDURE:15 mm balloon sweep  BLOOD LOSS- 0 to min. SPEC- no  IMPLANT- none    PROCEDURE: After informed consent, the patient was placed under anesthesia in the semi-prone position. The duodenoscope was passed without difficulty to the area of the ampulla. A standard cannulation and ERCP was performed with the findings as outlined and described below. Patient tolerated the procedure well. ASSESSMENT:  1. CBD of 18 mm with min pus and small stones- widely patent papillotomy site allowed a 15 mm balloon but there was room for further papillotomy if needed in future   2. Pancreas not evaluated    3. GB absent    PLAN:  1.  Advance po     C Verdia Romberg, MD

## 2021-04-25 NOTE — ED TRIAGE NOTES
Patient arrives to ED pov from home. Patient complains of lower abdominal pain and n/v/d x few days. Patient has jaundice appearance and reports that being new. Patient states she has lost 20 lbs since March 22. Denies bloody diarrhea or vomiting. Patient reports fevers and chills at home.

## 2021-04-25 NOTE — ED PROVIDER NOTES
Patient presents the ER complaint of worsening abdominal pain. She presents with her  reports she has had some degree of abdominal pain which is worsened over the past couple days. States she has had issues with chronic abdominal pain, reports approximately 20 pound weight loss over the past couple months. Reports some nausea vomiting today. Denies fevers or chills. Denies hematemesis or melena. The history is provided by the patient. Abdominal Pain   This is a new problem. The current episode started more than 2 days ago. The problem has not changed since onset. The pain is located in the generalized abdominal region. The quality of the pain is aching and cramping. The pain is at a severity of 5/10. The pain is moderate. Associated symptoms include nausea and vomiting. Pertinent negatives include no hematochezia, no melena, no hematuria, no headaches, no arthralgias, no chest pain and no back pain. Nothing worsens the pain. The pain is relieved by nothing. Past workup includes CT scan. Her past medical history is significant for gallstones. The patient's surgical history includes appendectomy, cholecystectomy and hysterectomy. Past Medical History:   Diagnosis Date    Benign neoplasm of skin, site unspecified     BMI less than 19,adult     BMI 16.4    CCC (chronic calculous cholecystitis) 9/13/2018 11/13/18 s/p lap meghana and liver biopsy; Dr Tia Silva: GALLBLADDER: GALLBLADDER WITH ACUTE AND CHRONIC INFLAMMATION AND CHOLELITHIASIS. B: LIVER BIOPSY: HEPATIC PARENCHYMA WITH CIRRHOSIS (INFLAMMATORY GRADE: 2, FIBROSIS STAGE: 4). Electronically signed out on 11/14/2018 15:20 by Lauren Geller. Jannet Velasquez M.D., Ph.D.  Microscopic Description  A: Histologic sections of the gallbladder wall d    Claustrophobia     Fibromyalgia     Gallstones 2/25/2015    Wt loss; Hep C 2/4/15 US   TECHNIQUE: Sonographic gray scale and Doppler images were obtained of the abdomen.  FINDINGS: There are no discrete lesions in the visualized portions of the liver or spleen. The liver measures 18 cm and the spleen measures 14.5 cm. Limited visualization of the pancreas is unremarkable. The common bile duct diameter is 9 mm.  The gallbladder is distended measuring 11 cm in    Generalized anxiety disorder     Generalized hyperhidrosis     Hepatitis C     treated with no further complications    Lumbago     Migraine, unspecified, without mention of intractable migraine without mention of status migrainosus     managed with PRN medications    Motion sickness     Myalgia and myositis, unspecified     Other and unspecified nonspecific immunological findings     Personal history of tobacco use, presenting hazards to health     Scoliosis     Tobacco use disorder        Past Surgical History:   Procedure Laterality Date    HX APPENDECTOMY      HX  SECTION      HX COLONOSCOPY      HX ENDOSCOPY      HX ORTHOPAEDIC Left     knee    HX PELVIC LAPAROSCOPY      multiple due to endometriosis    HX ESPERANZA AND BSO      HX TONSIL AND ADENOIDECTOMY  as a child    HX UROLOGICAL      bladder/ bladder/ bladder sling         Family History:   Problem Relation Age of Onset    Arthritis-osteo Mother     Cancer Mother         breast    Heart Disease Mother     Hypertension Mother     Breast Cancer Mother 80       Social History     Socioeconomic History    Marital status:      Spouse name: Not on file    Number of children: Not on file    Years of education: Not on file    Highest education level: Not on file   Occupational History    Not on file   Social Needs    Financial resource strain: Not on file    Food insecurity     Worry: Not on file     Inability: Not on file    Transportation needs     Medical: Not on file     Non-medical: Not on file   Tobacco Use    Smoking status: Current Every Day Smoker     Packs/day: 1.00     Years: 35.00     Pack years: 35.00     Types: Cigarettes    Smokeless tobacco: Current User   Substance and Sexual Activity    Alcohol use: No     Alcohol/week: 0.0 standard drinks    Drug use: No    Sexual activity: Not on file   Lifestyle    Physical activity     Days per week: Not on file     Minutes per session: Not on file    Stress: Not on file   Relationships    Social connections     Talks on phone: Not on file     Gets together: Not on file     Attends [employer] service: Not on file     Active member of club or organization: Not on file     Attends meetings of clubs or organizations: Not on file     Relationship status: Not on file    Intimate partner violence     Fear of current or ex partner: Not on file     Emotionally abused: Not on file     Physically abused: Not on file     Forced sexual activity: Not on file   Other Topics Concern    Not on file   Social History Narrative    Not on file         ALLERGIES: Biaxin [clarithromycin], Gluten, and Lactose    Review of Systems   Constitutional: Positive for chills. Negative for fatigue. HENT: Negative for congestion, dental problem and voice change. Eyes: Negative for photophobia, redness and visual disturbance. Respiratory: Negative for chest tightness, shortness of breath and stridor. Cardiovascular: Negative for chest pain and leg swelling. Gastrointestinal: Positive for abdominal pain, nausea and vomiting. Negative for hematochezia and melena. Endocrine: Negative for polydipsia and polyuria. Genitourinary: Negative for decreased urine volume, hematuria and urgency. Musculoskeletal: Negative for arthralgias and back pain. Skin: Positive for color change. Negative for pallor and rash. Neurological: Negative for tremors, weakness and headaches. Hematological: Negative for adenopathy. Does not bruise/bleed easily. Psychiatric/Behavioral: Negative for behavioral problems and confusion. All other systems reviewed and are negative.       Vitals:    04/24/21 2023   BP: (!) 151/101   Pulse: (!) 105   Resp: 24   Temp: 97.7 °F (36.5 °C)   SpO2: 97%   Weight: 36.3 kg (80 lb)   Height: 5' 5\" (1.651 m)            Physical Exam  Vitals signs and nursing note reviewed. Constitutional:       Appearance: Normal appearance. She is ill-appearing. HENT:      Head: Normocephalic and atraumatic. Nose: Nose normal. No congestion or rhinorrhea. Mouth/Throat:      Mouth: Mucous membranes are moist.      Pharynx: No oropharyngeal exudate or posterior oropharyngeal erythema. Eyes:      General: Scleral icterus present. Extraocular Movements: Extraocular movements intact. Conjunctiva/sclera: Conjunctivae normal.      Pupils: Pupils are equal, round, and reactive to light. Neck:      Musculoskeletal: Normal range of motion and neck supple. Cardiovascular:      Rate and Rhythm: Normal rate and regular rhythm. Pulses: Normal pulses. Heart sounds: Normal heart sounds. No murmur. No friction rub. Pulmonary:      Effort: Pulmonary effort is normal. No respiratory distress. Breath sounds: Normal breath sounds. No stridor. No wheezing. Abdominal:      General: Abdomen is flat. Palpations: Abdomen is soft. Tenderness: There is abdominal tenderness. Musculoskeletal:         General: No swelling, tenderness or signs of injury. Right lower leg: No edema. Left lower leg: No edema. Skin:     General: Skin is warm and dry. Coloration: Skin is not jaundiced or pale. Findings: No bruising. Neurological:      General: No focal deficit present. Mental Status: She is alert and oriented to person, place, and time. Cranial Nerves: No cranial nerve deficit. Sensory: No sensory deficit. Motor: No weakness.       Coordination: Coordination normal.   Psychiatric:         Mood and Affect: Mood normal.         Behavior: Behavior normal.          MDM  Number of Diagnoses or Management Options  Abdominal pain, generalized  Liver mass  Diagnosis management comments: Ill-appearing, appears jaundiced with scleral icterus, diffuse abdominal tenderness noted on exam.    Differential diagnosis in this patient is broad    Review of records, status post cholecystectomy, history of choledocholithiasis requiring ERCP and stenting. Will obtain labs here including LFTs ammonia and lactic acid. 10:49 PM  White count elevated at 15,000. Normal lactic acid. Lipase elevated at 645, normal ammonia  Elevated bilirubin at 9.7, elevated AST and ALT as well as alk phos    CT shows something obstructing the common bile duct GI contacted they would like patient admitted n.p.o. and they will see in the morning. Amount and/or Complexity of Data Reviewed  Clinical lab tests: ordered and reviewed  Tests in the radiology section of CPT®: ordered and reviewed  Decide to obtain previous medical records or to obtain history from someone other than the patient: yes  Review and summarize past medical records: yes  Independent visualization of images, tracings, or specimens: yes    Risk of Complications, Morbidity, and/or Mortality  Presenting problems: moderate  Diagnostic procedures: moderate  Management options: high    Patient Progress  Patient progress: stable         Procedures        Results Include:    Recent Results (from the past 24 hour(s))   CBC WITH AUTOMATED DIFF    Collection Time: 04/24/21  8:28 PM   Result Value Ref Range    WBC 15.6 (H) 4.3 - 11.1 K/uL    RBC 5.36 (H) 4.05 - 5.2 M/uL    HGB 17.0 (H) 11.7 - 15.4 g/dL    HCT 48.8 (H) 35.8 - 46.3 %    MCV 91.0 79.6 - 97.8 FL    MCH 31.7 26.1 - 32.9 PG    MCHC 34.8 31.4 - 35.0 g/dL    RDW 14.4 11.9 - 14.6 %    PLATELET 638 492 - 513 K/uL    MPV 12.0 9.4 - 12.3 FL    ABSOLUTE NRBC 0.00 0.0 - 0.2 K/uL    NEUTROPHILS 91 (H) 47 - 75 %    BAND NEUTROPHILS 4 0 - 10 %    LYMPHOCYTES 2 (L) 16 - 44 %    MONOCYTES 3 3 - 9 %    ABS. NEUTROPHILS 14.8 (H) 1.7 - 8.2 K/UL    ABS. LYMPHOCYTES 0.3 (L) 0.5 - 4.6 K/UL    ABS. MONOCYTES 0.5 0.1 - 1.3 K/UL    RBC COMMENTS NORMOCYTIC/NORMOCHROMIC      WBC COMMENTS Result Confirmed By Smear      PLATELET COMMENTS ADEQUATE      DF MANUAL     LIPASE    Collection Time: 04/24/21  8:28 PM   Result Value Ref Range    Lipase 645 (H) 73 - 393 U/L   MAGNESIUM    Collection Time: 04/24/21  8:28 PM   Result Value Ref Range    Magnesium 2.4 1.8 - 2.4 mg/dL   LACTIC ACID    Collection Time: 04/24/21  8:28 PM   Result Value Ref Range    Lactic acid 2.0 0.4 - 2.0 MMOL/L   AMMONIA    Collection Time: 04/24/21  8:28 PM   Result Value Ref Range    Ammonia <10 (L) 11 - 32 UMOL/L   METABOLIC PANEL, COMPREHENSIVE    Collection Time: 04/24/21  9:37 PM   Result Value Ref Range    Sodium 134 (L) 136 - 145 mmol/L    Potassium 5.8 (H) 3.5 - 5.1 mmol/L    Chloride 102 98 - 107 mmol/L    CO2 27 21 - 32 mmol/L    Anion gap 5 (L) 7 - 16 mmol/L    Glucose 117 (H) 65 - 100 mg/dL    BUN 19 8 - 23 MG/DL    Creatinine 0.62 0.6 - 1.0 MG/DL    GFR est AA >60 >60 ml/min/1.73m2    GFR est non-AA >60 >60 ml/min/1.73m2    Calcium 8.4 8.3 - 10.4 MG/DL    Bilirubin, total 9.7 (H) 0.2 - 1.1 MG/DL    ALT (SGPT) 85 (H) 12 - 65 U/L    AST (SGOT) 150 (H) 15 - 37 U/L    Alk. phosphatase 434 (H) 50 - 136 U/L    Protein, total 6.6 6.3 - 8.2 g/dL    Albumin 2.2 (L) 3.2 - 4.6 g/dL    Globulin 4.4 (H) 2.3 - 3.5 g/dL    A-G Ratio 0.5 (L) 1.2 - 3.5       Voice dictation software was used during the making of this note. This software is not perfect and grammatical and other typographical errors may be present. This note has been proofread, but may still contain errors.   Tarah Espinoza MD; 4/24/2021 @10:50 PM   ===================================================================

## 2021-04-25 NOTE — PROGRESS NOTES
[facility]ist Progress Note    Patient: Tameka Morales MRN: [id number]  SSN: [id number]    YOB: 1950  Age: 72 y.o. Sex: female      Admit Date: 4/24/2021    LOS: 0 days     Subjective:     Chief Complaint: Abdominal pain  Reason for Admission: Biliary obstruction    72year old CF with a PMH of Hep C, cirrhosis with ascites & portal HTN, s/p cholecystectomy, fibromyalgia, IBS, and anxiety admitted on 4/25/21 with worsening abdominal pain over 2 weeks with associated nausea. She was found to have gallstones in her CBD. She went for ERCP with 2 stones removed on 4/25/21.    4/25 - I saw her after ERCP. Says she's hungry. Still with abdominal pain but improved. Denies N/V/D. Denies F/C. Denies CP/SOB. Review of systems negative except stated above. Assessment/Plan (MDM): Active Medical Complaints and Diagnoses:    Principal Problem:    Biliary obstruction (4/25/2021)  - S/P ERCP with 2 stones extracted  - Continue Ceftriaxone  - Doing better  - Check CMP in AM    Active Problems:    Abdominal pain (4/25/2021)  - Due to #1  - Slowly improving      Liver lesion, right lobe (4/25/2021)  - Seen on CT  - AFP pending  - Will need OP surveillance with GI      Chronic hepatitis C without hepatic coma ([facility] [address] (8/14/2015)  - S/P Harvoni      Generalized anxiety disorder ()  - Ativan PRN      BMI less than 19,adult (4/4/2019)      Recent unintentional weight loss over several months (4/25/2021)  - ? Due to #1  - Consult nutrition    Otherwise all chronic medical issues appear stable with no changes. See assessment at bottom of progress note for complete acute, chronic, and resolved hospital medical issues.     Diet: DIET CLEAR LIQUID  VTE ppx: SCDs    Objective:     Visit Vitals  BP (!) 145/85   Pulse 80   Temp 98.1 °F (36.7 °C)   Resp 17   Ht 5' 5\" (1.651 m)   Wt 36.3 kg (80 lb)   SpO2 96%   BMI 13.31 kg/m²      Oxygen Therapy  O2 Sat (%): 96 % (04/25/21 1352)  Pulse via Oximetry: 82 beats per minute (04/25/21 1330)  O2 Device: Nasal cannula (04/25/21 1259)  O2 Flow Rate (L/min): 3 l/min (04/25/21 1330)      Intake and Output:     Intake/Output Summary (Last 24 hours) at 4/25/2021 1449  Last data filed at 4/25/2021 1221  Gross per 24 hour   Intake 553 ml   Output 0 ml   Net 553 ml         Physical Exam:   GENERAL: alert, cooperative, no distress, appears stated age  EYE: conjunctivae/corneas clear. PERRL. THROAT & NECK: normal and no erythema or exudates noted. LUNG: clear to auscultation bilaterally  HEART: regular rate and rhythm, S1S2, no murmur, no JVD  ABDOMEN: soft, mildly tender, mildly distended. Bowel sounds normal.   EXTREMITIES:  No edema, 2+ pedal/radial pulses bilaterally  SKIN: jaundice appearance  NEUROLOGIC: A&Ox3. Cranial nerves 2-12 grossly intact. Lab/Data Review:  Recent Results (from the past 24 hour(s))   CBC WITH AUTOMATED DIFF    Collection Time: 04/24/21  8:28 PM   Result Value Ref Range    WBC 15.6 (H) 4.3 - 11.1 K/uL    RBC 5.36 (H) 4.05 - 5.2 M/uL    HGB 17.0 (H) 11.7 - 15.4 g/dL    HCT 48.8 (H) 35.8 - 46.3 %    MCV 91.0 79.6 - 97.8 FL    MCH 31.7 26.1 - 32.9 PG    MCHC 34.8 31.4 - 35.0 g/dL    RDW 14.4 11.9 - 14.6 %    PLATELET 933 953 - 583 K/uL    MPV 12.0 9.4 - 12.3 FL    ABSOLUTE NRBC 0.00 0.0 - 0.2 K/uL    NEUTROPHILS 91 (H) 47 - 75 %    BAND NEUTROPHILS 4 0 - 10 %    LYMPHOCYTES 2 (L) 16 - 44 %    MONOCYTES 3 3 - 9 %    ABS. NEUTROPHILS 14.8 (H) 1.7 - 8.2 K/UL    ABS. LYMPHOCYTES 0.3 (L) 0.5 - 4.6 K/UL    ABS.  MONOCYTES 0.5 0.1 - 1.3 K/UL    RBC COMMENTS NORMOCYTIC/NORMOCHROMIC      WBC COMMENTS Result Confirmed By Smear      PLATELET COMMENTS ADEQUATE      DF MANUAL     LIPASE    Collection Time: 04/24/21  8:28 PM   Result Value Ref Range    Lipase 645 (H) 73 - 393 U/L   MAGNESIUM    Collection Time: 04/24/21  8:28 PM   Result Value Ref Range    Magnesium 2.4 1.8 - 2.4 mg/dL   LACTIC ACID    Collection Time: 04/24/21  8:28 PM   Result Value Ref Range    Lactic acid 2.0 0.4 - 2.0 MMOL/L   AMMONIA    Collection Time: 04/24/21  8:28 PM   Result Value Ref Range    Ammonia <10 (L) 11 - 32 UMOL/L   PROCALCITONIN    Collection Time: 04/24/21  8:28 PM   Result Value Ref Range    Procalcitonin 2.67 ng/mL   PROTHROMBIN TIME + INR    Collection Time: 04/24/21  8:28 PM   Result Value Ref Range    Prothrombin time 14.2 12.5 - 14.7 sec    INR 1.1     METABOLIC PANEL, COMPREHENSIVE    Collection Time: 04/24/21  9:37 PM   Result Value Ref Range    Sodium 134 (L) 136 - 145 mmol/L    Potassium 5.8 (H) 3.5 - 5.1 mmol/L    Chloride 102 98 - 107 mmol/L    CO2 27 21 - 32 mmol/L    Anion gap 5 (L) 7 - 16 mmol/L    Glucose 117 (H) 65 - 100 mg/dL    BUN 19 8 - 23 MG/DL    Creatinine 0.62 0.6 - 1.0 MG/DL    GFR est AA >60 >60 ml/min/1.73m2    GFR est non-AA >60 >60 ml/min/1.73m2    Calcium 8.4 8.3 - 10.4 MG/DL    Bilirubin, total 9.7 (H) 0.2 - 1.1 MG/DL    ALT (SGPT) 85 (H) 12 - 65 U/L    AST (SGOT) 150 (H) 15 - 37 U/L    Alk.  phosphatase 434 (H) 50 - 136 U/L    Protein, total 6.6 6.3 - 8.2 g/dL    Albumin 2.2 (L) 3.2 - 4.6 g/dL    Globulin 4.4 (H) 2.3 - 3.5 g/dL    A-G Ratio 0.5 (L) 1.2 - 3.5     URINALYSIS W/ RFLX MICROSCOPIC    Collection Time: 04/25/21 12:48 AM   Result Value Ref Range    Color KALEB      Appearance CLEAR      Specific gravity <1.005 1.001 - 1.023    pH (UA) 6.5 5.0 - 9.0      Protein Negative NEG mg/dL    Glucose Negative NEG mg/dL    Ketone Negative NEG mg/dL    Bilirubin MODERATE (A) NEG      Blood SMALL (A) NEG      Urobilinogen 0.2 0.2 - 1.0 EU/dL    Nitrites Negative NEG      Leukocyte Esterase TRACE (A) NEG     URINE MICROSCOPIC    Collection Time: 04/25/21 12:48 AM   Result Value Ref Range    WBC 0-3 0 /hpf    RBC 0 0 /hpf    Epithelial cells 0-3 0 /hpf    Bacteria 0 0 /hpf    Casts 0 0 /lpf    Crystals, urine 0 0 /LPF    Mucus 0 0 /lpf    Other observations RESULTS VERIFIED MANUALLY     METABOLIC PANEL, COMPREHENSIVE    Collection Time: 04/25/21  4:10 AM   Result Value Ref Range    Sodium 141 136 - 145 mmol/L    Potassium 3.2 (L) 3.5 - 5.1 mmol/L    Chloride 107 98 - 107 mmol/L    CO2 23 21 - 32 mmol/L    Anion gap 11 7 - 16 mmol/L    Glucose 123 (H) 65 - 100 mg/dL    BUN 15 8 - 23 MG/DL    Creatinine 0.43 (L) 0.6 - 1.0 MG/DL    GFR est AA >60 >60 ml/min/1.73m2    GFR est non-AA >60 >60 ml/min/1.73m2    Calcium 9.0 8.3 - 10.4 MG/DL    Bilirubin, total 7.7 (H) 0.2 - 1.1 MG/DL    ALT (SGPT) 75 (H) 12 - 65 U/L    AST (SGOT) 87 (H) 15 - 37 U/L    Alk. phosphatase 420 (H) 50 - 136 U/L    Protein, total 6.5 6.3 - 8.2 g/dL    Albumin 2.2 (L) 3.2 - 4.6 g/dL    Globulin 4.3 (H) 2.3 - 3.5 g/dL    A-G Ratio 0.5 (L) 1.2 - 3.5     LIPASE    Collection Time: 04/25/21  4:10 AM   Result Value Ref Range    Lipase 477 (H) 73 - 393 U/L       SARS-CoV-2 Lab Results  \"Novel Coronavirus\" Test: No results found for: COV2NT   \"Emergent Disease\" Test: No results found for: EDPR  \"SARS-COV-2\" Test: No results found for: XGCOVT  \"Precision Labs\" Test: No results found for: RSLT  Rapid Test: No results found for: COVR        Imaging:  Xr Ercp / Ercb Combined    Result Date: 4/25/2021  History: Choledocholithiasis EXAM: ERCP FINDINGS: Fluoroscopic guidance is provided for ERCP. 5 images are available for review. 1 minute 37 seconds fluoroscopy time utilized. FINDINGS: The common bile duct is dilated measuring approximately 16 mm. The patient undergoes balloon sweep. No definite filling defects seen on the postsweep images. Clips are present from prior cholecystectomy. Prominent common bile duct. No filling defects seen after balloon sweep. Ct Abd Pelv W Cont    Result Date: 4/25/2021  EXAM: CT ABD PELV W CONT HISTORY: Abdominal pain and distention, elevated LFTs. TECHNIQUE: Axial images of the abdomen and pelvis were performed following the administration of intravenous contrast. Images were obtained in the axial plane and coronal reformatted images were created and reviewed.  CT scan performed using appropriate/available dose optimization/reduction/ALARA techniques. COMPARISON: 3/5/2015 FINDINGS: The visualized lung bases show centrilobular emphysema. The visualized mediastinum is unremarkable. The liver shows significant intrahepatic and extrahepatic biliary ductal dilatation. There is a small low-density lesion in the lateral aspect of the right lobe. There is a 6.7 mm density projecting in the distal common bile duct near the sphincter of Larry. This is causing marked dilatation of the common bile duct which measures up to 1.6 cm. (This is best seen on image number 52 of series 601 and image number 46 of series 2.) The gallbladder has been removed. The pancreatic duct is dilated throughout its course. The spleen, adrenal glands, and kidneys are within normal limits. The bladder appears grossly normal. Distal esophagus and stomach are unremarkable. Small and large bowel are without evidence of obstruction. An appendix is not visualized. There is wall thickening in what appears to be a portion of the jejunum in the left abdomen. The significance of this is unclear. There is a small to moderate amount of ascites in the abdomen and pelvis. No evidence of free air, focal fluid collection. No pathologic adenopathy. No abdominal aortic aneurysm. Osseous structures are without evidence of acute fracture or suspicious lesion. There is a 6.7 mm density projecting in the distal common bile duct at the level of the sphincter of Larry. This may represent a small stone or mass. This is causing significant dilatation of the common bile duct and extrahepatic duct and intrahepatic ducts. The patient is post cholecystectomy. There is a small to moderate amount of ascites in the abdomen and pelvis. There is apparent thickening of the wall of jejunum in the left abdomen. The significance of this is unclear. This could be secondary to enteritis. Please correlate clinically.     No results found for this visit on 04/24/21. Cultures:   All Micro Results     Procedure Component Value Units Date/Time    CULTURE, BLOOD [[id number]] Collected: 04/25/21 0410    Order Status: Completed Specimen: Blood Updated: 04/25/21 0439    CULTURE, BLOOD [[id number]] Collected: 04/25/21 0410    Order Status: Completed Specimen: Blood Updated: 04/25/21 0438          Assessment:     Primary Diagnosis: Biliary obstruction    Hospital Problems as of 4/25/2021 Date Reviewed: 4/15/2021          Codes Class Noted - Resolved POA    * (Principal) Biliary obstruction ICD-10-CM: K83.1  ICD-9-CM: 576.2  4/25/2021 - Present Yes        Abdominal pain ICD-10-CM: R10.9  ICD-9-CM: 789.00  4/25/2021 - Present Yes        Liver lesion, right lobe ICD-10-CM: K76.9  ICD-9-CM: 573.8  4/25/2021 - Present Yes        Chronic hepatitis C without hepatic coma (HCC) ICD-10-CM: B18.2  ICD-9-CM: 070.54  8/14/2015 - Present Yes        Generalized anxiety disorder ICD-10-CM: F41.1  ICD-9-CM: 300.02  Unknown - Present Yes        Recent unintentional weight loss over several months ICD-10-CM: R63.4  ICD-9-CM: 783.21  4/25/2021 - Present Yes        BMI less than 19,adult ICD-10-CM: Z68.1  ICD-9-CM: V85.0  4/4/2019 - Present Yes    Overview Signed 4/4/2019  2:08 PM by Natalio Murcia MD     BMI 16.4             Chronic pain ICD-10-CM: G89.29  ICD-9-CM: 338.29  8/14/2015 - Present Yes        Tobacco use disorder ICD-10-CM: F17.200  ICD-9-CM: 305.1  Unknown - Present Yes        RESOLVED: Nausea and vomiting ICD-10-CM: R11.2  ICD-9-CM: 787.01  4/25/2021 - 4/25/2021 Yes                Discharge Plan:     Home tomorrow    Signed By: Michael Vance DO     April 25, 2021

## 2021-04-25 NOTE — PROGRESS NOTES
04/25/21 0256   Dual Skin Pressure Injury Assessment   Dual Skin Pressure Injury Assessment WDL   Second Care Provider (Based on [address] [place] [address]) Joel Irving RN     No skin breakdown noted.

## 2021-04-25 NOTE — PROGRESS NOTES
TRANSFER - IN REPORT:    Verbal report received from [address] [facility] [address], RN(name) on [facility]  being received from ED(unit) for routine progression of care      Report consisted of patients Situation, Background, Assessment and   Recommendations(SBAR). Information from the following report(s) SBAR, ED Summary and MAR was reviewed with the receiving nurse. Opportunity for questions and clarification was provided. Assessment to be completed upon patients arrival to unit and care assumed.

## 2021-04-25 NOTE — H&P
INTERNAL MEDICINE H&P/CONSULT    Subjective:     72years old female with history of Hep C, portal HTN, fibromyalgia, ibs,, presents with worsening abdominal pain. She presents with her  reports she has had some degree of abdominal pain which is worsened over the past couple days. States she has had issues with chronic abdominal pain, reports approximately 20 pound weight loss over the past couple months. Reports some nausea vomiting today. Denies fevers or chills. Denies hematemesis or melena. On further questioning, pt had fever through last week up to 101. Her abd pain is severe w/ vomiting. Her GB stone and cholecystectomy was 3y ago. Past Medical History:   Diagnosis Date    Benign neoplasm of skin, site unspecified     BMI less than 19,adult     BMI 16.4    CCC (chronic calculous cholecystitis) 9/13/2018 11/13/18 s/p lap meghana and liver biopsy; Dr Mady Smith: GALLBLADDER: GALLBLADDER WITH ACUTE AND CHRONIC INFLAMMATION AND CHOLELITHIASIS. B: LIVER BIOPSY: HEPATIC PARENCHYMA WITH CIRRHOSIS (INFLAMMATORY GRADE: 2, FIBROSIS STAGE: 4). Electronically signed out on 11/14/2018 15:20 by More Geller M.D., Ph.D.  Microscopic Description  A: Histologic sections of the gallbladder wall d    Claustrophobia     Fibromyalgia     Gallstones 2/25/2015    Wt loss; Hep C 2/4/15 US   TECHNIQUE: Sonographic gray scale and Doppler images were obtained of the abdomen. FINDINGS: There are no discrete lesions in the visualized portions of the liver or spleen. The liver measures 18 cm and the spleen measures 14.5 cm. Limited visualization of the pancreas is unremarkable. The common bile duct diameter is 9 mm.  The gallbladder is distended measuring 11 cm in    Generalized anxiety disorder     Generalized hyperhidrosis     Hepatitis C     treated with no further complications    Lumbago     Migraine, unspecified, without mention of intractable migraine without mention of status migrainosus     managed with PRN medications    Motion sickness     Myalgia and myositis, unspecified     Other and unspecified nonspecific immunological findings     Personal history of tobacco use, presenting hazards to health     Scoliosis     Tobacco use disorder       Past Surgical History:   Procedure Laterality Date    HX APPENDECTOMY      HX  SECTION      HX COLONOSCOPY      HX ENDOSCOPY      HX ORTHOPAEDIC Left     knee    HX PELVIC LAPAROSCOPY      multiple due to endometriosis    HX ESPERANZA AND BSO      HX TONSIL AND ADENOIDECTOMY  as a child    HX UROLOGICAL      bladder/ bladder/ bladder sling      Prior to Admission medications    Medication Sig Start Date End Date Taking? Authorizing Provider   oxyCODONE IR (ROXICODONE) 10 mg tab immediate release tablet Take 1 Tab by mouth four (4) times daily as needed (for pain) for up to 30 days. Max Daily Amount: 40 mg. Indications: pain 21  Claudia Brennan MD   fentaNYL (DURAGESIC) 75 mcg/hr 1 Patch by TransDERmal route every seventy-two (72) hours for 30 days. Max Daily Amount: 1 Patch. mylan brand  Indications: chronic pain with narcotic drug tolerance 21  Claudia Brennan MD   LORazepam (ATIVAN) 1 mg tablet TAKE 1 TABLET BY MOUTH three TIMES DAILY AS NEEDED FOR ANXIETY( MAXIMUM DAILY DOSE IS 4 TABLETS) 21   Claudia Brennan MD   LORazepam (ATIVAN) 1 mg tablet Take 1 Tab by mouth nightly. Max Daily Amount: 1 mg. 21   Claudia Brennan MD   SUMAtriptan (IMITREX) 50 mg tablet TAKE 1 TABLET BY MOUTH ONCE AS NEEDED FOR MIGRAINE 19   Claudia Brennan MD   erythromycin (ILOTYCIN) ophthalmic ointment Apply 0.3 g to eye every six (6) hours. 19   Claudia Brennan MD   naloxone [facility]) 4 mg/actuation nasal spray Use 1 spray intranasally, then discard. Repeat with new spray every 2 min as needed for opioid overdose symptoms, alternating nostrils.   Indications: Decrease in Rate & Depth of Breathing due to Opioid Drug, Toxic Amount of Narcotics in the Body 4/4/19   Ya Jean MD   ascorbic acid, vitamin C, (VITAMIN C) 500 mg tablet Take 500 mg by mouth daily. Provider, Historical     Allergies   Allergen Reactions    Biaxin [Clarithromycin] Itching and Nausea Only    Gluten Diarrhea    Lactose Diarrhea      Social History     Tobacco Use    Smoking status: Current Every Day Smoker     Packs/day: 1.00     Years: 35.00     Pack years: 35.00     Types: Cigarettes    Smokeless tobacco: Current User   Substance Use Topics    Alcohol use: No     Alcohol/week: 0.0 standard drinks        Family History:  HTN    Review of Systems   A comprehensive review of systems was negative except for that written in the HPI. Objective: Intake / Output:  No intake/output data recorded. No intake/output data recorded. Physical Exam:  Visit Vitals  BP (!) 138/96   Pulse 93   Temp 97.7 °F (36.5 °C)   Resp 24   Ht 5' 5\" (1.651 m)   Wt 36.3 kg (80 lb)   SpO2 93%   BMI 13.31 kg/m²     General appearance: awake, alert, cooperative, moderate distress, appears stated age - Pale, icterus, Dry mucous membranes, slim  Head: Normocephalic, without obvious abnormality, atraumatic  Back: symmetric, no curvature. ROM normal. No CVA tenderness. Lungs: clear to auscultation bilaterally - Diminished bs bibasilar  Heart: regular rate and rhythm, S1, S2 normal, no murmur, click, rub or gallop. Abdomen: soft, severe upper tenderness, no distension, normal bowel sound, no masses, no organomegaly  Extremities: atraumatic, no cyanosis - Bilateral lower limbs edema none. Skin: No rashes or ulceration.   Neurologic: Grossly intact    ECG: sinus rhythm     Data Review (Labs):   Recent Results (from the past 24 hour(s))   CBC WITH AUTOMATED DIFF    Collection Time: 04/24/21  8:28 PM   Result Value Ref Range    WBC 15.6 (H) 4.3 - 11.1 K/uL    RBC 5.36 (H) 4.05 - 5.2 M/uL    HGB 17.0 (H) 11.7 - 15.4 g/dL    HCT 48.8 (H) 35.8 - 46.3 %    MCV 91.0 79.6 - 97.8 FL    MCH 31.7 26.1 - 32.9 PG    MCHC 34.8 31.4 - 35.0 g/dL    RDW 14.4 11.9 - 14.6 %    PLATELET 974 877 - 846 K/uL    MPV 12.0 9.4 - 12.3 FL    ABSOLUTE NRBC 0.00 0.0 - 0.2 K/uL    NEUTROPHILS 91 (H) 47 - 75 %    BAND NEUTROPHILS 4 0 - 10 %    LYMPHOCYTES 2 (L) 16 - 44 %    MONOCYTES 3 3 - 9 %    ABS. NEUTROPHILS 14.8 (H) 1.7 - 8.2 K/UL    ABS. LYMPHOCYTES 0.3 (L) 0.5 - 4.6 K/UL    ABS. MONOCYTES 0.5 0.1 - 1.3 K/UL    RBC COMMENTS NORMOCYTIC/NORMOCHROMIC      WBC COMMENTS Result Confirmed By Smear      PLATELET COMMENTS ADEQUATE      DF MANUAL     LIPASE    Collection Time: 04/24/21  8:28 PM   Result Value Ref Range    Lipase 645 (H) 73 - 393 U/L   MAGNESIUM    Collection Time: 04/24/21  8:28 PM   Result Value Ref Range    Magnesium 2.4 1.8 - 2.4 mg/dL   LACTIC ACID    Collection Time: 04/24/21  8:28 PM   Result Value Ref Range    Lactic acid 2.0 0.4 - 2.0 MMOL/L   AMMONIA    Collection Time: 04/24/21  8:28 PM   Result Value Ref Range    Ammonia <10 (L) 11 - 32 UMOL/L   PROCALCITONIN    Collection Time: 04/24/21  8:28 PM   Result Value Ref Range    Procalcitonin 2.83 ng/mL   METABOLIC PANEL, COMPREHENSIVE    Collection Time: 04/24/21  9:37 PM   Result Value Ref Range    Sodium 134 (L) 136 - 145 mmol/L    Potassium 5.8 (H) 3.5 - 5.1 mmol/L    Chloride 102 98 - 107 mmol/L    CO2 27 21 - 32 mmol/L    Anion gap 5 (L) 7 - 16 mmol/L    Glucose 117 (H) 65 - 100 mg/dL    BUN 19 8 - 23 MG/DL    Creatinine 0.62 0.6 - 1.0 MG/DL    GFR est AA >60 >60 ml/min/1.73m2    GFR est non-AA >60 >60 ml/min/1.73m2    Calcium 8.4 8.3 - 10.4 MG/DL    Bilirubin, total 9.7 (H) 0.2 - 1.1 MG/DL    ALT (SGPT) 85 (H) 12 - 65 U/L    AST (SGOT) 150 (H) 15 - 37 U/L    Alk.  phosphatase 434 (H) 50 - 136 U/L    Protein, total 6.6 6.3 - 8.2 g/dL    Albumin 2.2 (L) 3.2 - 4.6 g/dL    Globulin 4.4 (H) 2.3 - 3.5 g/dL    A-G Ratio 0.5 (L) 1.2 - 3.5         Assessment:     1a- Acute biliary obstruction, per CT report, stone vs mass 6.7 mm in CBD. Hx of hep C, cholecystitis and GB stones. Tawny Profit 1b- Fever, possible ac cholangitis  2- Lipase elevation, possible early acute biliary pancreaitis  3- Fibromyalgia  4- Hyponatremia, hypovolemic, mild  4- Hyperkalemia, moderate    Plan:     NPO  Rocephin iv  Follow blood CS  IVF hydration, gentle  Na Kayexalate x1  Blood pressure and pain control  AM lab  Gi consulted for ERCP  Continue essential home medications. Patient is full code. Further management depends on patient progress. Thank you for the oppourtinity to contribute in the care of your patient. Time 35 minutes.     Signed By: Gentry Mejia MD     April 25, 2021

## 2021-04-25 NOTE — ANESTHESIA PREPROCEDURE EVALUATION
Anesthetic History   No history of anesthetic complications            Review of Systems / Medical History  Patient summary reviewed and pertinent labs reviewed    Pulmonary          Smoker         Neuro/Psych         Headaches     Cardiovascular                Pertinent negatives: No past MI  Exercise tolerance: >4 METS     GI/Hepatic/Renal       Hepatitis (s/p tx): type C    Liver disease (elevated LFTs, cirrhosis)     Endo/Other        Arthritis     Other Findings            Physical Exam    Airway  Mallampati: II  TM Distance: < 4 cm  Neck ROM: decreased range of motion   Mouth opening: Normal    Comments: Mildly decreased ROM Cardiovascular  Regular rate and rhythm,  S1 and S2 normal,  no murmur, click, rub, or gallop  Rhythm: regular  Rate: normal         Dental  No notable dental hx       Pulmonary  Breath sounds clear to auscultation               Abdominal  GI exam deferred       Other Findings            Anesthetic Plan    ASA: 3  Anesthesia type: general          Induction: Intravenous and RSI  Anesthetic plan and risks discussed with: Patient

## 2021-04-25 NOTE — CONSULTS
[facility] Consult Note       Primary GI Physician: Dr Willa Tucker    Referring Provider:  Dr Elle Meza Date:  4/25/2021    Admit Date:  4/24/2021    Chief Complaint:  CBD stone, abd pain    Subjective:     History of Present Illness:  Patient is a 72 y.o. female with PMH of HCV treated with [facility] [place], cholecystectomy,  Cirrhosis, migraines, seen in consultation at the request of Dr. Dilan Rutledge for evaluation of CBD stone. She presented to the ED during the night with a 3-4 day history of worsening RUQ abdominal pain, reporting a weight loss of about 20 lb in the month. Labs in the ED revealed WBC 15.6, hgb 17.0, platelet 261, INR 1.1, with Na 141, K+ 3.2, creatinine 0.43, tbili 7.7, alb 2.2, ALT 75, AST 87, alk phos 420, lipase 477. CT A/P revealed significant intra and extrahepatic dilation with CBD 1.6 cm and evidence of density in the distal CBD. Also noted was a small lesion in the right lobe of the liver. Patient reports RUQ pain over the last 2-3 months, intermittently flaring and often accompanied by nausea and vomiting. She has become jaundiced in the last 2 days with dark urine. She has hx of HCV, treated with Madelin [place] with success. She has hx of ERCP with stent placement in the pancreatic duct for retained stone. She is S/P cholecystectomy. CT A/P W Contrast 4/24/21  FINDINGS:   The visualized lung bases show centrilobular emphysema. The visualized  mediastinum is unremarkable.   The liver shows significant intrahepatic and extrahepatic biliary ductal  dilatation. There is a small low-density lesion in the lateral aspect of the  right lobe.   There is a 6.7 mm density projecting in the distal common bile duct near the  sphincter of Larry. This is causing marked dilatation of the common bile duct  which measures up to 1.6 cm. (This is best seen on image number 52 of series 601  and image number 46 of series 2.)   The gallbladder has been removed.  The pancreatic duct is dilated throughout its  course.   The spleen, adrenal glands, and kidneys are within normal limits. The bladder  appears grossly normal.   Distal esophagus and stomach are unremarkable. Small and large bowel are without  evidence of obstruction. An appendix is not visualized.   There is wall thickening in what appears to be a portion of the jejunum in the  left abdomen. The significance of this is unclear.   There is a small to moderate amount of ascites in the abdomen and pelvis. No  evidence of free air, focal fluid collection. No pathologic adenopathy. No  abdominal aortic aneurysm.   Osseous structures are without evidence of acute fracture or suspicious lesion.    IMPRESSION  There is a 6.7 mm density projecting in the distal common bile duct at the level  of the sphincter of Larry. This may represent a small stone or mass. This is  causing significant dilatation of the common bile duct and extrahepatic duct and  intrahepatic ducts.   The patient is post cholecystectomy.   There is a small to moderate amount of ascites in the abdomen and pelvis.   There is apparent thickening of the wall of jejunum in the left abdomen. The  significance of this is unclear. This could be secondary to enteritis. Please  correlate clinically. EGD 9/2018 - Dr Suresh Daugherty - normal ampulla, probable gastroparesis  ERCP 8/2018 - Dr Suresh Daugherty - choledocholithiasis, prophylactic pancreatic duct stent placement, moderate retained food in the stomach    PMH:  Past Medical History:   Diagnosis Date    Benign neoplasm of skin, site unspecified     BMI less than 19,adult     BMI 16.4    CCC (chronic calculous cholecystitis) 9/13/2018 11/13/18 s/p lap meghana and liver biopsy; Dr Anali Zamora: GALLBLADDER: GALLBLADDER WITH ACUTE AND CHRONIC INFLAMMATION AND CHOLELITHIASIS. B: LIVER BIOPSY: HEPATIC PARENCHYMA WITH CIRRHOSIS (INFLAMMATORY GRADE: 2, FIBROSIS STAGE: 4). Electronically signed out on 11/14/2018 15:20 by Christopher Pop. Alana Perez M.D., Ph.D.  Microscopic Description  A: Histologic sections of the gallbladder wall d    Claustrophobia     Fibromyalgia     Gallstones 2015    Wt loss; Hep C 2/4/15 US   TECHNIQUE: Sonographic gray scale and Doppler images were obtained of the abdomen. FINDINGS: There are no discrete lesions in the visualized portions of the liver or spleen. The liver measures 18 cm and the spleen measures 14.5 cm. Limited visualization of the pancreas is unremarkable. The common bile duct diameter is 9 mm. The gallbladder is distended measuring 11 cm in    Generalized anxiety disorder     Generalized hyperhidrosis     Hepatitis C     treated with no further complications    Lumbago     Migraine, unspecified, without mention of intractable migraine without mention of status migrainosus     managed with PRN medications    Motion sickness     Myalgia and myositis, unspecified     Other and unspecified nonspecific immunological findings     Personal history of tobacco use, presenting hazards to health     Scoliosis     Tobacco use disorder        PSH:  Past Surgical History:   Procedure Laterality Date    HX APPENDECTOMY      HX  SECTION      HX COLONOSCOPY      HX ENDOSCOPY      HX ORTHOPAEDIC Left     knee    HX PELVIC LAPAROSCOPY      multiple due to endometriosis    HX ESPERANZA AND BSO      HX TONSIL AND ADENOIDECTOMY  as a child    HX UROLOGICAL      bladder/ bladder/ bladder sling       Allergies: Allergies   Allergen Reactions    Biaxin [Clarithromycin] Itching and Nausea Only    Gluten Diarrhea    Lactose Diarrhea       Home Medications:  Prior to Admission medications    Medication Sig Start Date End Date Taking? Authorizing Provider   fentaNYL (DURAGESIC) 75 mcg/hr 1 Patch by TransDERmal route every seventy-two (72) hours for 30 days.  Max Daily Amount: 1 Patch. mylan brand  Indications: chronic pain with narcotic drug tolerance 21 Yes Jer Mariano MD SUMAtriptan (IMITREX) 50 mg tablet TAKE 1 TABLET BY MOUTH ONCE AS NEEDED FOR MIGRAINE 12/26/19  Yes Ya Jean MD   ascorbic acid, vitamin C, (VITAMIN C) 500 mg tablet Take 500 mg by mouth daily. Yes Provider, Historical   oxyCODONE IR (ROXICODONE) 10 mg tab immediate release tablet Take 1 Tab by mouth four (4) times daily as needed (for pain) for up to 30 days. Max Daily Amount: 40 mg. Indications: pain 4/23/21 5/23/21  Ya Jean MD   LORazepam (ATIVAN) 1 mg tablet TAKE 1 TABLET BY MOUTH three TIMES DAILY AS NEEDED FOR ANXIETY( MAXIMUM DAILY DOSE IS 4 TABLETS) 4/24/21   Ya Jean MD   LORazepam (ATIVAN) 1 mg tablet Take 1 Tab by mouth nightly. Max Daily Amount: 1 mg. 2/23/21   Ya Jean MD   naloxone [facility]) 4 mg/actuation nasal spray Use 1 spray intranasally, then discard. Repeat with new spray every 2 min as needed for opioid overdose symptoms, alternating nostrils.   Indications: Decrease in Rate & Depth of Breathing due to Opioid Drug, Toxic Amount of Narcotics in the Body 4/4/19   Ya Jean MD       Hospital Medications:  Current Facility-Administered Medications   Medication Dose Route Frequency    0.9% sodium chloride infusion  50 mL/hr IntraVENous CONTINUOUS    morphine injection 2 mg  2 mg IntraVENous Q4H PRN    LORazepam (ATIVAN) tablet 0.5 mg  0.5 mg Oral Q6H PRN    sodium chloride (NS) flush 5-40 mL  5-40 mL IntraVENous Q8H    sodium chloride (NS) flush 5-40 mL  5-40 mL IntraVENous PRN    acetaminophen (TYLENOL) tablet 650 mg  650 mg Oral Q6H PRN    Or    acetaminophen (TYLENOL) suppository 650 mg  650 mg Rectal Q6H PRN    senna (SENOKOT) tablet 17.2 mg  2 Tab Oral BID PRN    nicotine (NICODERM CQ) 21 mg/24 hr patch 1 Patch  1 Patch TransDERmal DAILY PRN    cefTRIAXone (ROCEPHIN) 2 g in 0.9% sodium chloride (MBP/ADV) 50 mL MBP  2 g IntraVENous Q24H    ondansetron (ZOFRAN) injection 4 mg  4 mg IntraVENous Q4H PRN       Social History:  Social History     Tobacco Use    Smoking status: Current Every Day Smoker     Packs/day: 1.00     Years: 35.00     Pack years: 35.00     Types: Cigarettes    Smokeless tobacco: Current User   Substance Use Topics    Alcohol use: No     Alcohol/week: 0.0 standard drinks         Family History:  Family History   Problem Relation Age of Onset    Arthritis-osteo Mother     Cancer Mother         breast    Heart Disease Mother     Hypertension Mother     Breast Cancer Mother 80       Review of Systems:  A detailed 10 system ROS is obtained, with pertinent positives as listed above. All others are negative. Diet:  NPO    Objective:     Physical Exam:  Vitals:  Visit Vitals  /86   Pulse (!) 111   Temp 98.2 °F (36.8 °C)   Resp 18   Ht 5' 5\" (1.651 m)   Wt 36.3 kg (80 lb)   SpO2 93%   BMI 13.31 kg/m²     Gen:  Pt is alert, cooperative, no acute distress; thin body habitus  Skin:  Extremities and face reveal no rashes. Jaundiced. HEENT: Sclerae icteric. Extra-occular muscles are intact. No abnormal pigmentation of the lips. The neck is supple. Cardiovascular: Regular rate and rhythm. No murmurs, gallops, or rubs. Respiratory:  Comfortable breathing with no accessory muscle use. Clear but mildly diminished breath sounds anteriorly with no wheezes, rales, or rhonchi. GI:  Abdomen nondistended, soft, and mildly tender to RUQ with palpation. Active bowel sounds. No enlargement of the liver or spleen. No masses palpable. Rectal:  Deferred  Musculoskeletal:  No pitting edema of the lower legs. Neurological:  Gross memory appears intact. Patient is alert and oriented. Psychiatric:  Mood appears appropriate with judgement intact. Lymphatic:  No cervical or supraclavicular adenopathy.     Laboratory:    Recent Labs     04/25/21  0410 04/24/21 2137 04/24/21 2028   WBC  --   --  15.6*   HGB  --   --  17.0*   HCT  --   --  48.8*   PLT  --   --  294   MCV  --   --  91.0    134*  --    K 3. 2* 5.8*  --     102  --    CO2 23 27  --    BUN 15 19  --    CREA 0.43* 0.62  --    CA 9.0 8.4  --    MG  --   --  2.4   * 117*  --    * 434*  --    AST 87* 150*  --    ALT 75* 85*  --    TBILI 7.7* 9.7*  --    ALB 2.2* 2.2*  --    TP 6.5 6.6  --    LPSE 477*  --  645*   PTP  --   --  14.2   INR  --   --  1.1          Assessment:     Principal Problem:    Biliary obstruction (4/25/2021)        Plan:     71 yo female is seen in consultation for evaluation of RUQ pain and CBD dilation on CT imaging. She reports a 2-3 month hx of RUQ pain, intermittently flaring and accompanied by nausea and vomiting. She has lost weight in the last month due to symptoms, noting jaundice and icterus in the last few days. She has hx of HCV, treated with Harvoni with good success. Of note, CT imaging noted lesion in right lobe of liver, not seen on previous imaging. Prior ERCP in 2018 with pancreatic duct stent placement is reviewed. She underwent cholecystectomy with liver biopsy in 2018 which revealed evidence of cirrhosis. 1.  NPO for ERCP today  2. Follow labs  3. Will need further evaluation of lesion noted in right lobe of liver with further imaging; will obtain AFP marker today  4. Risks of procedure reviewed with patient      Patient is seen and examined in collaboration with Dr. Fawad Leyva. Assessment and plan as per Dr. Fawad Leyva.   Keith Hernandes NP

## 2021-04-25 NOTE — PROGRESS NOTES
TRANSFER - OUT REPORT:    Verbal report given to Page on Sofia Freeman  being transferred to GI Lab for ordered procedure       Report consisted of patients Situation, Background, Assessment and   Recommendations(SBAR). Information from the following report(s) Kardex was reviewed with the receiving nurse. Lines:   Peripheral IV 04/24/21 Left Antecubital (Active)   Site Assessment Clean, dry, & intact 04/25/21 0731   Phlebitis Assessment 0 04/25/21 0731   Infiltration Assessment 0 04/25/21 0731   Dressing Status Clean, dry, & intact 04/25/21 0731   Dressing Type Transparent 04/25/21 0731   Hub Color/Line Status Infusing 04/25/21 0731   Alcohol Cap Used Yes 04/24/21 2027        Opportunity for questions and clarification was provided.       Patient transported with:   [employer]

## 2021-04-25 NOTE — PROGRESS NOTES
TRANSFER - OUT REPORT:    Verbal report given to Taye Desai RN (name) on Burgess Anaya being transferred to 2nd floor(unit) for routine progression of care       Report consisted of patient's Situation, Background, Assessment and   Recommendations(SBAR). Information from the following report(s) Kardex, Intake/Output, MAR, Recent Results and Procedure Verification was reviewed with the receiving nurse. Opportunity for questions and clarification was provided.       Patient transported with:   [employer]

## 2021-04-25 NOTE — PROGRESS NOTES
END OF SHIFT NOTE:    INTAKE/OUTPUT  04/24 0701 - 04/25 0700  In: 153 [I.V.:153]  Out: -   Voiding: NO  Catheter: NO  Drain:              Flatus: Patient does have flatus present. Stool:  0 occurrences. Characteristics:       Emesis: 0 occurrences. Characteristics:        VITAL SIGNS  Patient Vitals for the past 12 hrs:   Temp Pulse Resp BP SpO2   04/25/21 0254 98.1 °F (36.7 °C) 99 22 (!) 155/99 93 %   04/25/21 0148  93   93 %   04/25/21 0142  97  (!) 138/96 93 %   04/25/21 0137  99   94 %   04/25/21 0136  (!) 101   94 %   04/25/21 0129  98  (!) 137/93 94 %   04/25/21 0125  91   95 %   04/25/21 0113  89   95 %   04/25/21 0106  94   95 %   04/25/21 0056  99   96 %   04/25/21 0048  (!) 108  (!) 162/110 (!) 89 %   04/25/21 0026  90   94 %   04/25/21 0012  99  128/81 95 %   04/25/21 0003  (!) 109  130/79 95 %   04/24/21 2312  99  (!) 156/88 93 %   04/24/21 2303  99   94 %   04/24/21 2243  100  (!) 175/90 93 %   04/24/21 2221  98   95 %   04/24/21 2212  (!) 109  (!) 156/90 95 %   04/24/21 2112  (!) 102  (!) 142/87 94 %   04/24/21 2101  100   95 %   04/24/21 2100  96   96 %   04/24/21 2036  91  (!) 149/96 94 %   04/24/21 2023 97.7 °F (36.5 °C) (!) 105 24 (!) 151/101 97 %       Pain Assessment  Pain Intensity 1: 0 (04/25/21 0400)  Pain Location 1: Abdomen     Patient Stated Pain Goal: 3    Ambulating  Yes    Shift report given to oncoming nurse at the bedside.     Jerome Knox RN

## 2021-04-25 NOTE — ANESTHESIA POSTPROCEDURE EVALUATION
Procedure(s):  ENDOSCOPIC RETROGRADE CHOLANGIOPANCREATOGRAPHY (ERCP)  ENDOSCOPIC STONE EXTRACTION/BALLOON SWEEP. general    Anesthesia Post Evaluation        Patient location during evaluation: PACU  Patient participation: complete - patient participated  Level of consciousness: awake  Pain management: satisfactory to patient  Airway patency: patent  Anesthetic complications: no  Cardiovascular status: hemodynamically stable  Respiratory status: spontaneous ventilation  Hydration status: euvolemic  Post anesthesia nausea and vomiting:  none      INITIAL Post-op Vital signs:   Vitals Value Taken Time   /82 04/25/21 1314   Temp 36.5 °C (97.7 °F) 04/25/21 1234   Pulse 79 04/25/21 1319   Resp 55 04/25/21 1319   SpO2 99 % 04/25/21 1319   Vitals shown include unvalidated device data.

## 2021-04-25 NOTE — PROGRESS NOTES
TRANSFER - IN REPORT:    Verbal report received from [facility] on Connye Ivory  being received from [facility] for ordered procedure      Report consisted of patients Situation, Background, Assessment and   Recommendations(SBAR). Information from the following report(s) Kardex was reviewed with the receiving nurse. Opportunity for questions and clarification was provided. Assessment completed upon patients arrival to unit and care assumed.

## 2021-04-26 VITALS
BODY MASS INDEX: 13.33 KG/M2 | DIASTOLIC BLOOD PRESSURE: 80 MMHG | OXYGEN SATURATION: 94 % | TEMPERATURE: 97.9 F | SYSTOLIC BLOOD PRESSURE: 133 MMHG | HEIGHT: 65 IN | RESPIRATION RATE: 17 BRPM | HEART RATE: 76 BPM | WEIGHT: 80 LBS

## 2021-04-26 PROBLEM — E43 SEVERE PROTEIN-CALORIE MALNUTRITION (HCC): Status: ACTIVE | Noted: 2021-04-26

## 2021-04-26 PROBLEM — K80.31 CHOLANGITIS DUE TO BILE DUCT CALCULUS WITH OBSTRUCTION: Status: ACTIVE | Noted: 2021-04-26

## 2021-04-26 PROBLEM — A41.51 SEPSIS DUE TO ESCHERICHIA COLI (E. COLI) (HCC): Status: ACTIVE | Noted: 2021-04-26

## 2021-04-26 LAB
ACC. NO. FROM MICRO ORDER, ACCP: NORMAL
AFP-TM SERPL-MCNC: 3.4 NG/ML
ALBUMIN SERPL-MCNC: 1.9 G/DL (ref 3.2–4.6)
ALBUMIN/GLOB SERPL: 0.5 {RATIO} (ref 1.2–3.5)
ALP SERPL-CCNC: 317 U/L (ref 50–136)
ALT SERPL-CCNC: 47 U/L (ref 12–65)
ANION GAP SERPL CALC-SCNC: 9 MMOL/L (ref 7–16)
AST SERPL-CCNC: 45 U/L (ref 15–37)
BILIRUB SERPL-MCNC: 3.7 MG/DL (ref 0.2–1.1)
BLOOD CULTURE PCR TESTING: NORMAL
BUN SERPL-MCNC: 17 MG/DL (ref 8–23)
CALCIUM SERPL-MCNC: 8.7 MG/DL (ref 8.3–10.4)
CHLORIDE SERPL-SCNC: 106 MMOL/L (ref 98–107)
CO2 SERPL-SCNC: 26 MMOL/L (ref 21–32)
CREAT SERPL-MCNC: 0.23 MG/DL (ref 0.6–1)
ERYTHROCYTE [DISTWIDTH] IN BLOOD BY AUTOMATED COUNT: 14.4 % (ref 11.9–14.6)
GLOBULIN SER CALC-MCNC: 4 G/DL (ref 2.3–3.5)
GLUCOSE SERPL-MCNC: 107 MG/DL (ref 65–100)
HCT VFR BLD AUTO: 36.4 % (ref 35.8–46.3)
HGB BLD-MCNC: 12.6 G/DL (ref 11.7–15.4)
MCH RBC QN AUTO: 31.6 PG (ref 26.1–32.9)
MCHC RBC AUTO-ENTMCNC: 34.6 G/DL (ref 31.4–35)
MCV RBC AUTO: 91.2 FL (ref 79.6–97.8)
NRBC # BLD: 0 K/UL (ref 0–0.2)
PLATELET # BLD AUTO: 164 K/UL (ref 150–450)
PMV BLD AUTO: 11.8 FL (ref 9.4–12.3)
POTASSIUM SERPL-SCNC: 2.9 MMOL/L (ref 3.5–5.1)
POTASSIUM SERPL-SCNC: 4.8 MMOL/L (ref 3.5–5.1)
PROT SERPL-MCNC: 5.9 G/DL (ref 6.3–8.2)
RBC # BLD AUTO: 3.99 M/UL (ref 4.05–5.2)
SODIUM SERPL-SCNC: 141 MMOL/L (ref 136–145)
WBC # BLD AUTO: 10.8 K/UL (ref 4.3–11.1)

## 2021-04-26 PROCEDURE — 74011250637 HC RX REV CODE- 250/637: Performed by: HOSPITALIST

## 2021-04-26 PROCEDURE — 74011250637 HC RX REV CODE- 250/637: Performed by: INTERNAL MEDICINE

## 2021-04-26 PROCEDURE — 36415 COLL VENOUS BLD VENIPUNCTURE: CPT

## 2021-04-26 PROCEDURE — 85027 COMPLETE CBC AUTOMATED: CPT

## 2021-04-26 PROCEDURE — 94760 N-INVAS EAR/PLS OXIMETRY 1: CPT

## 2021-04-26 PROCEDURE — 2709999900 HC NON-CHARGEABLE SUPPLY

## 2021-04-26 PROCEDURE — 87150 DNA/RNA AMPLIFIED PROBE: CPT

## 2021-04-26 PROCEDURE — 80053 COMPREHEN METABOLIC PANEL: CPT

## 2021-04-26 PROCEDURE — 74011250636 HC RX REV CODE- 250/636: Performed by: HOSPITALIST

## 2021-04-26 PROCEDURE — 74011000258 HC RX REV CODE- 258: Performed by: HOSPITALIST

## 2021-04-26 PROCEDURE — 84132 ASSAY OF SERUM POTASSIUM: CPT

## 2021-04-26 RX ORDER — POTASSIUM CHLORIDE 20 MEQ/1
40 TABLET, EXTENDED RELEASE ORAL ONCE
Status: COMPLETED | OUTPATIENT
Start: 2021-04-26 | End: 2021-04-26

## 2021-04-26 RX ORDER — CEFPODOXIME PROXETIL 200 MG/1
200 TABLET, FILM COATED ORAL 2 TIMES DAILY
Qty: 10 TAB | Refills: 0 | Status: SHIPPED | OUTPATIENT
Start: 2021-04-26 | End: 2022-03-05

## 2021-04-26 RX ORDER — POTASSIUM CHLORIDE 20 MEQ/1
60 TABLET, EXTENDED RELEASE ORAL
Status: COMPLETED | OUTPATIENT
Start: 2021-04-26 | End: 2021-04-26

## 2021-04-26 RX ADMIN — MORPHINE SULFATE 2 MG: 2 INJECTION, SOLUTION INTRAMUSCULAR; INTRAVENOUS at 12:11

## 2021-04-26 RX ADMIN — LORAZEPAM 0.5 MG: 0.5 TABLET ORAL at 05:53

## 2021-04-26 RX ADMIN — MORPHINE SULFATE 2 MG: 2 INJECTION, SOLUTION INTRAMUSCULAR; INTRAVENOUS at 07:18

## 2021-04-26 RX ADMIN — POTASSIUM CHLORIDE 60 MEQ: 20 TABLET, EXTENDED RELEASE ORAL at 09:16

## 2021-04-26 RX ADMIN — LORAZEPAM 0.5 MG: 0.5 TABLET ORAL at 11:35

## 2021-04-26 RX ADMIN — POTASSIUM CHLORIDE 40 MEQ: 20 TABLET, EXTENDED RELEASE ORAL at 12:11

## 2021-04-26 RX ADMIN — Medication 10 ML: at 05:18

## 2021-04-26 RX ADMIN — CEFTRIAXONE 2 G: 2 INJECTION, POWDER, FOR SOLUTION INTRAMUSCULAR; INTRAVENOUS at 03:16

## 2021-04-26 RX ADMIN — MORPHINE SULFATE 2 MG: 2 INJECTION, SOLUTION INTRAMUSCULAR; INTRAVENOUS at 03:14

## 2021-04-26 RX ADMIN — Medication 10 ML: at 13:07

## 2021-04-26 RX ADMIN — LORAZEPAM 0.5 MG: 0.5 TABLET ORAL at 17:21

## 2021-04-26 NOTE — PROGRESS NOTES
Care Management Interventions  Mode of Transport at Discharge: Self  Transition of Care Consult (CM Consult): Discharge Planning  Discharge Durable Medical Equipment: No  Physical Therapy Consult: No  Occupational Therapy Consult: No  Speech Therapy Consult: No  Current Support Network: Own Home  Confirm Follow Up Transport: Self  [place] Resource Information Provided?: No  Discharge Location  Discharge Placement: Home    Chart screened by  for discharge planning. Patient to possibly discharge today. PT/OT have not been consulted at this time, patient will likely return home at discharge. CM will continue to follow patient during hospitalization for discharge planning and needs. No needs identified at this time. Please consult  if any new issues arise.

## 2021-04-26 NOTE — PROGRESS NOTES
Physician Progress Note      PATIENTWaldron Callas  CSN #:                  [id number]  :                       1950  ADMIT DATE:       2021 8:20 PM  [address] [place] DATE:  RESPONDING  PROVIDER #:        [id number] DO          QUERY TEXT:    Pt admitted with abdominal pain. Pt noted to have gallstones in the CBD had ERCP and removed 2 stones . If possible, please document in the progress notes and discharge summary if you are evaluating and /or treating any of the following: The medical record reflects the following:  Risk Factors: 72 yr, hep c, fever at home,    Clinical Indicators: WBC 15.6, procalcitonin 2.67, tachycardia 105,108,    Treatment: IV Rocephin, IV fluid resuscitation in ER, ERCP removed 2 stones from the CBD, IV Zosyn in ER  Options provided:  -- Sepsis, present on admission  -- No Sepsis, localized infection, only  -- Sepsis was ruled out  -- Other - I will add my own diagnosis  -- Disagree - Not applicable / Not valid  -- Disagree - Clinically unable to determine / Unknown  -- Refer to Clinical Documentation Reviewer    PROVIDER RESPONSE TEXT:    This patient has sepsis which was present on admission.     Query created by: Jacqui Demarco on 2021 10:50 AM      Electronically signed by:  [address] DO 2021 11:02 AM

## 2021-04-26 NOTE — PROGRESS NOTES
Comprehensive Nutrition Assessment    Type and Reason for Visit: Initial, Positive nutrition screen  Best Practice Alert for Malnutrition Screening Tool: Recently Lost Weight Without Trying: Yes, If Yes, How Much Weight Loss: 14 - 23 lbs, Eating Poorly Due to Decreased Appetite: Yes    Nutrition Recommendations/Plan:    Continue with current diet   Add Ensure Enlive with all meals     Malnutrition Assessment:  Malnutrition Status: Severe malnutrition  Context: Chronic illness  Findings of clinical characteristics of malnutrition:   Energy Intake:  Mild decrease in energy intake (specify)  Weight Loss:  7.00 - Greater than 10% over 6 months     Body Fat Loss:  7 - Severe body fat loss, Buccal region   Muscle Mass Loss:  7 - Severe muscle mass loss, Temples (temporalis), Hand (interosseous), Clavicles (pectoralis &deltoids)  Fluid Accumulation:  No significant fluid accumulation,     Strength:  Not performed       Nutrition Assessment:   Nutrition History: Patient is gluten free and lactose free      Nutrition Background: PMH: Hep C, portal HTN, fibromyalgia, ibs,, presents with worsening abdominal pain, nausea and vomiting for several weeks. ERCP on 4/25 and 2 stones removed  Daily Update:  Spoke with patient who reports poor intake, nausea, vomiting for last 6 weeks. She reports normal weight about 105-110 lbs. Patient states she was only eating baby food and water for several weeks. She states last night was the first time eating more normal food in over a month. She reports she ate about 50% of breakfast. Patient interested in supplement outside the hospital that is more in line with her diet. Suggested OWYN, plant based protein, gluten and dairy free option. All patients question and concerns answered.     Nutrition Related Findings:   Severe wasting and fat loss Wound Type: None    Current Nutrition Therapies:  DIET GI SOFT No options chosen    Current Intake:   Average Meal Intake: 1-25% Average Supplement Intake: None ordered      Anthropometric Measures:  Height: 5' 5\" (165.1 cm)  Current Body Wt: 36.3 kg (80 lb 0.4 oz)(4/24), Weight source: Not specified  BMI: 13.3, Underweight (BMI less than 18.5)      WT / BMI 4/24/2021 7/7/2020   WEIGHT 80 lb 108 lb   BODY MASS INDEX 13.31 kg/m2 17.97 kg/m2     Ideal Body Weight (lbs) (Calculated): 125 lbs (57 kg), 64 %  Usual Body Wt: 49 kg (108 lb)(per pt and confirmed in EMR), Percent weight change: -25.9          Edema: No data recorded   Estimated Daily Nutrient Needs:  Energy (kcal/day): 1793-0310 (Kcal/kg(35-40), Weight Used: Current(36.3 kg))  Protein (g/day): 63-72 (20% kcals) Weight Used: ( )  Fluid (ml/day):   (1 ml/kcal)    Nutrition Diagnosis:   · Inadequate oral intake related to (gallstones) as evidenced by nausea, vomiting, poor intake prior to admission    · Severe malnutrition, In context of chronic illness related to altered GI structure, pain as evidenced by poor intake prior to admission, severe muscle loss, severe loss of subcutaneous fat, weight loss greater than or equal to 10% in 6 months    Nutrition Interventions:   Food and/or Nutrient Delivery: Continue current diet, Start oral nutrition supplement     Coordination of Nutrition Care: Continue to monitor while inpatient    Goals:       Active Goal: Intake >75% of nutritional needs within 7 days    Nutrition Monitoring and Evaluation:      Food/Nutrient Intake Outcomes: Food and nutrient intake, Supplement intake  Physical Signs/Symptoms Outcomes: Weight, Nausea/vomiting    Discharge Planning:    Continue oral nutrition supplement    Electronically signed by Arlene Marin MS, RD, LD on 4/26/2021 at 10:25 AM.  Contact: [phone]

## 2021-04-26 NOTE — DISCHARGE SUMMARY
Hospitalist Discharge Summary     Patient ID:  Ness Dickinson  [id number]  34 y.o.  1950  Admit date: 4/24/2021  Discharge date: 4/26/2021  Attending: Kemar Olvera DO  PCP:  Samir Flowers MD  Treatment Team: Attending Provider: Kemar Olvera DO; Utilization Review: Robles Roberts RN; Care Manager: Gerardo Grey RN    Principal Diagnosis Biliary obstruction    Hospital Problems as of 4/26/2021 Date Reviewed: 4/15/2021          Codes Class Noted - Resolved POA    * (Principal) Biliary obstruction ICD-10-CM: K83.1  ICD-9-CM: 576.2  4/25/2021 - Present Yes        Abdominal pain ICD-10-CM: R10.9  ICD-9-CM: 789.00  4/25/2021 - Present Yes        Liver lesion, right lobe ICD-10-CM: K76.9  ICD-9-CM: 573.8  4/25/2021 - Present Yes        Chronic hepatitis C without hepatic coma ([facility] ICD-10-CM: B18.2  ICD-9-CM: 070.54  8/14/2015 - Present Yes        Cirrhosis of liver with ascites ([facility] ICD-10-CM: K74.60, R18.8  ICD-9-CM: 571.5  11/21/2018 - Present Yes    Overview Signed 11/21/2018 11:48 AM by Lucius Felder MD     11/13/18 s/p liver biopsy (at time of lap cholecystectomy); Dr Serjio Thurman: HEPATIC PARENCHYMA WITH CIRRHOSIS (INFLAMMATORY GRADE: 2, FIBROSIS STAGE: 4). Electronically signed out on 11/14/2018 15:20 by Oscar Willson M.D., Ph.D.   Microscopic Description   Histologic sections demonstrate a fragment of partially cauterized hepatic parenchyma showing nodules  by fibrous bands containing increased numbers of chronic inflammatory cells. A trichrome histochemical stain demonstrates features compatible with cirrhosis. Reticulin histochemistry demonstrates hepatocytic plates of 1-2 cell layers thickness. PAS stain shows glycogenation. Following Diastase treatment, no intracytoplasmic, PAS-avid globules are identified. Increased iron staining is not seen on iron Prussian blue histochemistry. [facility] and [facility] concur. Generalized anxiety disorder ICD-10-CM: F41.1  ICD-9-CM: 300.02  Unknown - Present Yes        Recent unintentional weight loss over several months ICD-10-CM: R63.4  ICD-9-CM: 783.21  4/25/2021 - Present Yes        BMI less than 19,adult ICD-10-CM: Z68.1  ICD-9-CM: V85.0  4/4/2019 - Present Yes    Overview Signed 4/4/2019  2:08 PM by Cynthia Bhatt MD     BMI 16.4             Chronic pain ICD-10-CM: G89.29  ICD-9-CM: 338.29  8/14/2015 - Present Yes        Tobacco use disorder ICD-10-CM: F17.200  ICD-9-CM: 305.1  Unknown - Present Yes        Severe protein-calorie malnutrition ([address] ICD-10-CM: E43  ICD-9-CM: 723  4/26/2021 - Present Yes        Sepsis due to Escherichia coli (E. coli) ([facility]) ICD-10-CM: A41.51  ICD-9-CM: 038.42, 995.91  4/26/2021 - Present Yes        Cholangitis due to bile duct calculus with obstruction ICD-10-CM: K80.31  ICD-9-CM: 576.1, 574.51  4/26/2021 - Present Yes        RESOLVED: Nausea and vomiting ICD-10-CM: R11.2  ICD-9-CM: 787.01  4/25/2021 - 4/25/2021 Yes              Hospital Course:  Please refer to the admission H&P for details of presentation. In summary, the patient is a 72year old CF with a PMH of Hep C, cirrhosis with ascites & portal HTN, s/p cholecystectomy, fibromyalgia, IBS, and anxiety admitted on 4/25/21 with worsening abdominal pain over 2 weeks with associated nausea. She was found to have gallstones in her CBD. She went for ERCP with 2 stones removed on 4/25/21. She was to have an MRCP for the liver lesion, but refused, wanting it as an outpatient. She was discharged home in stable condition.     Significant Diagnostic Studies:    Labs: Results:       Chemistry Recent Labs     04/26/21  1536 04/26/21  0539 04/25/21  0410 04/24/21 2137   GLU  --  107* 123* 117*   NA  --  141 141 134*   K 4.8 2.9* 3.2* 5.8*   CL  --  106 107 102   CO2  --  26 23 27   BUN  --  17 15 19   CREA  --  0.23* 0.43* 0.62   CA  --  8.7 9.0 8.4   AGAP  --  9 11 5*   AP  --  317* 420* 434*   TP  -- 5. 9* 6.5 6.6   ALB  --  1.9* 2.2* 2.2*   GLOB  --  4.0* 4.3* 4.4*   AGRAT  --  0.5* 0.5* 0.5*      CBC w/Diff Recent Labs     04/26/21  0745 04/24/21 2028   WBC 10.8 15.6*   RBC 3.99* 5.36*   HGB 12.6 17.0*   HCT 36.4 48.8*    294   GRANS  --  91*   LYMPH  --  2*      Cardiac Enzymes No results for input(s): CPK, CKND1, CECELIA in the last 72 hours. No lab exists for component: CKRMB, TROIP   Coagulation Recent Labs     04/24/21 2028   PTP 14.2   INR 1.1       Lipid Panel Lab Results   Component Value Date/Time    Cholesterol, total 176 08/11/2017 10:17 AM    HDL Cholesterol 68 08/11/2017 10:17 AM    LDL, calculated 97 08/11/2017 10:17 AM    VLDL, calculated 11 08/11/2017 10:17 AM    Triglyceride 56 08/11/2017 10:17 AM      BNP No results for input(s): BNPP in the last 72 hours. Liver Enzymes Recent Labs     04/26/21  0539   TP 5.9*   ALB 1.9*   *      Thyroid Studies Lab Results   Component Value Date/Time    TSH 1.900 12/26/2019 03:47 PM            Imaging:  Xr Ercp / Ercb Combined    Result Date: 4/25/2021  Prominent common bile duct. No filling defects seen after balloon sweep. Ct Abd Pelv W Cont    Result Date: 4/25/2021  There is a 6.7 mm density projecting in the distal common bile duct at the level of the sphincter of Larry. This may represent a small stone or mass. This is causing significant dilatation of the common bile duct and extrahepatic duct and intrahepatic ducts. The patient is post cholecystectomy. There is a small to moderate amount of ascites in the abdomen and pelvis. There is apparent thickening of the wall of jejunum in the left abdomen. The significance of this is unclear. This could be secondary to enteritis. Please correlate clinically. Microbiology/Cultures:   All Micro Results     Procedure Component Value Units Date/Time    BLOOD CULTURE ID PANEL [[id number]] Collected: 04/26/21 0030    Order Status: Completed Specimen: Blood Updated: 04/26/21 0727     Acc. no. from Micro Order [id number]     Blood Culture PCR Testing       MULTIPLEX PCR NEGATIVE:  A negative FilmArray BCID result does not exclude the possibility of bloodstream infection. CULTURE, BLOOD [[id number]] Collected: 04/25/21 0410    Order Status: Completed Specimen: Blood Updated: 04/26/21 0633     Special Requests: --        LEFT  FOREARM       Culture result: NO GROWTH 1 DAY       CULTURE, BLOOD [[id number]] Collected: 04/25/21 0410    Order Status: Completed Specimen: Blood Updated: 04/26/21 4362     Special Requests: --        RIGHT  FOREARM       Culture result: NO GROWTH 1 DAY             Discharge Exam:  Visit Vitals  /80   Pulse 76   Temp 97.9 °F (36.6 °C)   Resp 17   Ht 5' 5\" (1.651 m)   Wt 36.3 kg (80 lb)   SpO2 94%   BMI 13.31 kg/m²     General appearance: alert, cooperative, no distress, appears stated age  Lungs: clear to auscultation bilaterally  Heart: regular rate and rhythm, S1, S2 normal, no murmur, click, rub or gallop  Abdomen: soft, non-tender. Bowel sounds normal. No masses,  no organomegaly  Extremities: no cyanosis or edema  Neurologic: Grossly normal    Disposition: home  Discharge Condition: stable  Patient Instructions:   Current Discharge Medication List      START taking these medications    Details   cefpodoxime (VANTIN) 200 mg tablet Take 1 Tab by mouth two (2) times a day. Qty: 10 Tab, Refills: 0         CONTINUE these medications which have NOT CHANGED    Details   fentaNYL (DURAGESIC) 75 mcg/hr 1 Patch by TransDERmal route every seventy-two (72) hours for 30 days.  Max Daily Amount: 1 Patch. mylan brand  Indications: chronic pain with narcotic drug tolerance  Qty: 10 Patch, Refills: 0    Comments: Needs MYLAN product  Associated Diagnoses: Chronic pain syndrome; Fibromyalgia; Primary osteoarthritis involving multiple joints; Chronic, continuous use of opioids      SUMAtriptan (IMITREX) 50 mg tablet TAKE 1 TABLET BY MOUTH ONCE AS NEEDED FOR MIGRAINE  Qty: 20 Tab, Refills: 5 Associated Diagnoses: Migraine without aura and without status migrainosus, not intractable      ascorbic acid, vitamin C, (VITAMIN C) 500 mg tablet Take 500 mg by mouth daily. oxyCODONE IR (ROXICODONE) 10 mg tab immediate release tablet Take 1 Tab by mouth four (4) times daily as needed (for pain) for up to 30 days. Max Daily Amount: 40 mg. Indications: pain  Qty: 120 Tab, Refills: 0    Associated Diagnoses: Chronic pain syndrome; Fibromyalgia; Primary osteoarthritis involving multiple joints; Chronic, continuous use of opioids      !! LORazepam (ATIVAN) 1 mg tablet TAKE 1 TABLET BY MOUTH three TIMES DAILY AS NEEDED FOR ANXIETY( MAXIMUM DAILY DOSE IS 4 TABLETS)  Qty: 90 Tab, Refills: 2    Associated Diagnoses: Generalized anxiety disorder      !! LORazepam (ATIVAN) 1 mg tablet Take 1 Tab by mouth nightly. Max Daily Amount: 1 mg. Qty: 30 Tab, Refills: 2    Associated Diagnoses: Generalized anxiety disorder      naloxone (NARCAN) 4 mg/actuation nasal spray Use 1 spray intranasally, then discard. Repeat with new spray every 2 min as needed for opioid overdose symptoms, alternating nostrils. Indications: Decrease in Rate & Depth of Breathing due to Opioid Drug, Toxic Amount of Narcotics in the Body  Qty: 2 Each, Refills: 5    Associated Diagnoses: Chronic, continuous use of opioids       !! - Potential duplicate medications found. Please discuss with provider.       STOP taking these medications       erythromycin (ILOTYCIN) ophthalmic ointment Comments:   Reason for Stopping:               Activity: Activity as tolerated  Diet: Regular Diet  Wound Care: None needed    Follow-up  ·   PCP in one week  · [facility]  Time spent to discharge patient 35 minutes  Signed:  Gibson Davis DO  4/26/2021  4:13 PM

## 2021-04-26 NOTE — DISCHARGE INSTRUCTIONS
DISCHARGE SUMMARY from Nurse    PATIENT INSTRUCTIONS:    After general anesthesia or intravenous sedation, for 24 hours or while taking prescription Narcotics:  · Limit your activities  · Do not drive and operate hazardous machinery  · Do not make important personal or business decisions  · Do  not drink alcoholic beverages  · If you have not urinated within 8 hours after discharge, please contact your surgeon on call. Report the following to your surgeon:  · Excessive pain, swelling, redness or odor of or around the surgical area  · Temperature over 100.5  · Nausea and vomiting lasting longer than 4 hours or if unable to take medications  · Any signs of decreased circulation or nerve impairment to extremity: change in color, persistent  numbness, tingling, coldness or increase pain  · Any questions    What to do at Home:  Recommended activity: Activity as tolerated and no driving while taking narcotics. If you experience any of the following symptoms fever of 101 or greater, pain unrelieved by medication, uncontrollable nausea or vomiting  please follow up with PCP. *  Please give a list of your current medications to your Primary Care Provider. *  Please update this list whenever your medications are discontinued, doses are      changed, or new medications (including over-the-counter products) are added. *  Please carry medication information at all times in case of emergency situations. These are general instructions for a healthy lifestyle:    No smoking/ No tobacco products/ Avoid exposure to second hand smoke  Surgeon General's Warning:  Quitting smoking now greatly reduces serious risk to your health.     Obesity, smoking, and sedentary lifestyle greatly increases your risk for illness    A healthy diet, regular physical exercise & weight monitoring are important for maintaining a healthy lifestyle    You may be retaining fluid if you have a history of heart failure or if you experience any of the following symptoms:  Weight gain of 3 pounds or more overnight or 5 pounds in a week, increased swelling in our hands or feet or shortness of breath while lying flat in bed. Please call your doctor as soon as you notice any of these symptoms; do not wait until your next office visit. The discharge information has been reviewed with the patient. The patient verbalized understanding. Discharge medications reviewed with the patient and appropriate educational materials and side effects teaching were provided. ___________________________________________________________________________________________________________________________________    Patient Education        Endoscopic Retrograde Cholangiopancreatogram (ERCP): What to Expect at [address] [place] [address]  After you have an endoscopic retrograde cholangiopancreatogram (ERCP), you probably will stay at the hospital or clinic for 1 to 2 hours. This will allow the medicine to wear off. You will be able to go home after your doctor or a nurse checks to make sure you are not having any problems. If you stay in the hospital overnight, you may go home the next day. You may have a sore throat for a day or two after the procedure. This care sheet gives you a general idea about how long it will take for you to recover. But each person recovers at a different pace. Follow the steps below to get better as quickly as possible. How can you care for yourself at home? Activity    · Rest as much as you need to after you go home.     · You should be able to go back to your usual activities the day after the procedure. Diet    · Follow your doctor's directions for eating after the procedure.     · Drink plenty of fluids (unless your doctor tells you not to). Medicines    · Your doctor will tell you if and when you can restart your medicines.  He or she will also give you instructions about taking any new medicines.     · If you take aspirin or some other blood thinner, ask your doctor if and when to start taking it again. Make sure that you understand exactly what your doctor wants you to do.     · If you have a sore throat the next day, use an over-the-counter spray to numb your throat. Be safe with medicines. Read and follow all instructions on the label. Follow-up care is a key part of your treatment and safety. Be sure to make and go to all appointments, and call your doctor if you are having problems. It's also a good idea to know your test results and keep a list of the medicines you take. When should you call for help? Call 911 anytime you think you may need emergency care. For example, call if:    · You passed out (lost consciousness).     · Your stools are maroon or very bloody.     · You have trouble breathing. Call your doctor now or go to the emergency room if:    · You have new or worse belly pain.     · You have pain that does not get better after you take pain medicine.     · You have a fever.     · You cannot pass stools or gas.     · You are sick to your stomach or cannot hold down fluids.     · You have blood in your stools. Watch closely for changes in your health, and be sure to contact your doctor if:    · Your throat still hurts after a day or two.     · You do not get better as expected. Where can you learn more? Go to http://www.gray.com/  Enter [id number] in the search box to learn more about \"Endoscopic Retrograde Cholangiopancreatogram (ERCP): What to Expect at Home. \"  Current as of: April 15, 2020               Content Version: 12.8  © 2006-2021 [employer]. Care instructions adapted under license by [employer] (which disclaims liability or warranty for this information). If you have questions about a medical condition or this instruction, always ask your healthcare professional. [address] any warranty or liability for your use of this information.        Patient Education        Abdominal Pain: Care Instructions  Your Care Instructions     Abdominal pain has many possible causes. Some aren't serious and get better on their own in a few days. Others need more testing and treatment. If your pain continues or gets worse, you need to be rechecked and may need more tests to find out what is wrong. You may need surgery to correct the problem. Don't ignore new symptoms, such as fever, nausea and vomiting, urination problems, pain that gets worse, and dizziness. These may be signs of a more serious problem. Your doctor may have recommended a follow-up visit in the next 8 to 12 hours. If you are not getting better, you may need more tests or treatment. The doctor has checked you carefully, but problems can develop later. If you notice any problems or new symptoms, get medical treatment right away. Follow-up care is a key part of your treatment and safety. Be sure to make and go to all appointments, and call your doctor if you are having problems. It's also a good idea to know your test results and keep a list of the medicines you take. How can you care for yourself at home? · Rest until you feel better. · To prevent dehydration, drink plenty of fluids. Choose water and other caffeine-free clear liquids until you feel better. If you have kidney, heart, or liver disease and have to limit fluids, talk with your doctor before you increase the amount of fluids you drink. · If your stomach is upset, eat mild foods, such as rice, dry toast or crackers, bananas, and applesauce. Try eating several small meals instead of two or three large ones. · Wait until 48 hours after all symptoms have gone away before you have spicy foods, alcohol, and drinks that contain caffeine. · Do not eat foods that are high in fat. · Avoid anti-inflammatory medicines such as aspirin, ibuprofen (Advil, Motrin), and naproxen (Aleve). These can cause stomach upset.  Talk to your doctor if you take daily aspirin for another health problem. When should you call for help? Call 911 anytime you think you may need emergency care. For example, call if:    · You passed out (lost consciousness).     · You pass maroon or very bloody stools.     · You vomit blood or what looks like coffee grounds.     · You have new, severe belly pain. Call your doctor now or seek immediate medical care if:    · Your pain gets worse, especially if it becomes focused in one area of your belly.     · You have a new or higher fever.     · Your stools are black and look like tar, or they have streaks of blood.     · You have unexpected vaginal bleeding.     · You have symptoms of a urinary tract infection. These may include:  ? Pain when you urinate. ? Urinating more often than usual.  ? Blood in your urine.     · You are dizzy or lightheaded, or you feel like you may faint. Watch closely for changes in your health, and be sure to contact your doctor if:    · You are not getting better after 1 day (24 hours). Where can you learn more? Go to http://www.gray.com/  Enter L155 in the search box to learn more about \"Abdominal Pain: Care Instructions. \"  Current as of: February 26, 2020               Content Version: 12.8  © 2006-2021 [employer]. Care instructions adapted under license by [employer] (which disclaims liability or warranty for this information). If you have questions about a medical condition or this instruction, always ask your healthcare professional. [place] [address] any warranty or liability for your use of this information.

## 2021-04-26 NOTE — PROGRESS NOTES
[facility] DAILY PROGRESS NOTE    Admit Date:  4/24/2021    CC:  RUQ Pain    Problem List:  Principal Problem:    Biliary obstruction (4/25/2021)    Active Problems:    Generalized anxiety disorder ()      Tobacco use disorder ()      Chronic hepatitis C without hepatic coma ([facility] [address] (8/14/2015)      Chronic pain (8/14/2015)      Cirrhosis of liver with ascites ([facility] [address] (11/21/2018)      Overview: 11/13/18 s/p liver biopsy (at time of lap cholecystectomy); Dr Deb Muñoz: HEPATIC PARENCHYMA WITH CIRRHOSIS (INFLAMMATORY       GRADE: 2, FIBROSIS STAGE: 4). Electronically signed out on 11/14/2018 15:20 by Ochoa Baptiste. Kenia Bryant M.D.,       Ph.D.       Microscopic Description       Histologic sections demonstrate a fragment of partially cauterized hepatic       parenchyma showing nodules  by fibrous bands containing increased       numbers of chronic inflammatory cells. A trichrome histochemical stain       demonstrates features compatible with cirrhosis. Reticulin histochemistry       demonstrates hepatocytic plates of 1-2 cell layers thickness. PAS stain       shows glycogenation. Following Diastase treatment, no intracytoplasmic,       PAS-avid globules are identified. Increased iron staining is not seen on       iron Prussian blue histochemistry. Paula Jaimes and Robel Byers concur. BMI less than 19,adult (4/4/2019)      Overview: BMI 16.4      Liver lesion, right lobe (4/25/2021)      Recent unintentional weight loss over several months (4/25/2021)      Abdominal pain (4/25/2021)      Mrs. Jose Obregon is a 73 yo F, patient of Dr. Maria Elena Burgos, with PMH of HCV related Liver Cirrhosis treated with Harvoni and [facility] of [facility] who presented for RUQ pain, jaundice, weight loss and abnormal CT with CBD dilation, 6.7 mm density in the distal CBD and a small low density lesion in the right lobe of the liver.       ERCP done by Dr. Suki Bolanos on 4/25/21 which showed CBD of 18 mm with minimal pus and small stones which were swept out. AFP was normal at 3.4. Uzma Bailey spoke with radiologist, Dr. Kailyn Guzman, who read the CT and said that the liver lesion was 8 mm and was likely a hepatic cyst.    1. Choledocholithiasis  2. Liver Lesion on CT  3. HCV Liver Cirrhosis  4. Ascites on CT    - We spoke to the patient about doing an MRI for further evaluation of the liver lesion but she says she has an intolerance to the IV dye and she will not do an MRI. - I did let her know about her normal AFP and the CT re-read of likely a hepatic cyst.  Would recommend surveillance as outpatient for the liver lesion.    - LFTs are trending down after the ERCP which is excellent. - Patient feels better today and she wants to go home. I am fine with her discharge home. - She will follow up with us in GI clinic in a few weeks. Chelle Kothari MD   [facility]      Subjective:     Patient feels great and wants to go home. She denies any abdominal pain.     Medications:   Current Facility-Administered Medications   Medication Dose Route Frequency Provider Last Rate Last Admin    0.9% sodium chloride infusion  50 mL/hr IntraVENous CONTINUOUS Salazar Nguyen MD 50 mL/hr at 04/25/21 0248 50 mL/hr at 04/25/21 0248    morphine injection 2 mg  2 mg IntraVENous Q4H PRN Salazar Nguyen MD   2 mg at 04/25/21 2025    LORazepam (ATIVAN) tablet 0.5 mg  0.5 mg Oral Q6H PRN Salazar Nguyen MD   0.5 mg at 04/25/21 1720    sodium chloride (NS) flush 5-40 mL  5-40 mL IntraVENous Q8H Salazar Nguyen MD   10 mL at 04/25/21 1353    sodium chloride (NS) flush 5-40 mL  5-40 mL IntraVENous PRN Salazar Nguyen MD        acetaminophen (TYLENOL) tablet 650 mg  650 mg Oral Q6H PRN Salazar Nguyen MD        Or   [facility] acetaminophen (TYLENOL) suppository 650 mg  650 mg Rectal Q6H PRN Salazar Nguyen MD        senna (SENOKOT) tablet 17.2 mg  2 Tab Oral BID PRN Salazar Nguyen MD        nicotine (Berenice Law CQ) 21 mg/24 hr patch 1 Patch  1 Patch TransDERmal DAILY PRN Willian Starks MD   1 Patch at 04/25/21 0315    cefTRIAXone (ROCEPHIN) 2 g in 0.9% sodium chloride (MBP/ADV) 50 mL MBP  2 g IntraVENous Q24H Willian Starks  mL/hr at 04/25/21 0314 2 g at 04/25/21 0314    ondansetron (ZOFRAN) injection 4 mg  4 mg IntraVENous Q4H PRN Willian Starks MD        lidocaine (XYLOCAINE) 10 mg/mL (1 %) injection 0.1 mL  0.1 mL SubCUTAneous PRN Sol Barnes MD        fentaNYL citrate (PF) injection 100 mcg  100 mcg IntraVENous ONCE Sol Barnes MD        midazolam (VERSED) injection 2 mg  2 mg IntraVENous ONCE PRN Sol Barnes MD        midazolam (VERSED) injection 2 mg  2 mg IntraVENous ONCE Sol Barnes MD        oxyCODONE IR (ROXICODONE) tablet 5 mg  5 mg Oral ONCE PRN Sol Barnes MD        naloxone [facility]) injection 0.04 mg  0.04 mg IntraVENous EVERY 2 MINUTES AS NEEDED Sol Barnes MD           Review of Systems:  ROS was obtained, with pertinent positives as listed above. No chest pain or SOB. Objective:   Vitals:  Visit Vitals  /80   Pulse 74   Temp 97.7 °F (36.5 °C)   Resp 18   Ht 5' 5\" (1.651 m)   Wt 36.3 kg (80 lb)   SpO2 95%   BMI 13.31 kg/m²     Intake/Output:  No intake/output data recorded. 04/24 0701 - 04/25 1900  In: 1062 [I.V.:1062]  Out: 300 [Urine:300]  Exam:  General appearance: alert, cooperative, no distress. Appears chronically ill. Lungs: clear to auscultation bilaterally anteriorly  Heart: regular rate and rhythm  Abdomen: soft, non-tender. Bowel sounds normal. No masses, no organomegaly. Thin.   Extremities: extremities normal, atraumatic, no cyanosis or edema  Neuro:  alert and oriented    Data Review (Labs):    Recent Labs     04/25/21  0410 04/24/21  2137 04/24/21 2028   WBC  --   --  15.6*   HGB  --   --  17.0*   HCT  --   --  48.8*   PLT  --   --  294   MCV  --   --  91.0    134*  --    K 3.2* 5.8*  --     102  --    CO2 23 27  --    BUN 15 19  --    MG  --   --  2.4   AST 87* 150*  --    ALT 75* 85*  --    INR  --   --  1.1         Lamar Anderson MD  [facility]

## 2021-04-26 NOTE — PROGRESS NOTES
END OF SHIFT NOTE:    INTAKE/OUTPUT  04/25 0701 - 04/26 0700  In: 1370 [I.V.:1370]  Out: 600 [Urine:600]  Voiding: YES  Catheter: NO  Drain:              Flatus: Patient does have flatus present. Stool:  0 occurrences. Characteristics:       Emesis: 0 occurrences. Characteristics:        VITAL SIGNS  Patient Vitals for the past 12 hrs:   Temp Pulse Resp BP SpO2   04/26/21 0423 98.6 °F (37 °C) 61 18 134/86 95 %   04/26/21 0001 97.7 °F (36.5 °C) 69 18 (!) 143/82 94 %   04/25/21 2010 97.7 °F (36.5 °C) 74 18 133/80 95 %       Pain Assessment  Pain Intensity 1: 0 (04/26/21 0405)  Pain Location 1: Abdomen  Pain Intervention(s) 1: Medication (see MAR)  Patient Stated Pain Goal: 3    Ambulating  Yes    Shift report given to oncoming nurse at the bedside.     Sebastien Rutherford RN

## 2021-04-26 NOTE — PROGRESS NOTES
Care Management Interventions  Mode of Transport at Discharge: Self  Transition of Care Consult (CM Consult): Discharge Planning  Discharge Durable Medical Equipment: No  Physical Therapy Consult: No  Occupational Therapy Consult: No  Speech Therapy Consult: No  Current Support Network: Own Home  Confirm Follow Up Transport: Self  [place] Resource Information Provided?: No  Discharge Location  Discharge Placement: Home    Patient to discharge home today. No CM needs have been identified at this time. All milestones for discharge have been met. Patient to transport home with family.

## 2021-04-26 NOTE — PROGRESS NOTES
Discharge teaching complete. Patient and  verbalized understanding of diet, activity, s/sx as reviewed, and follow up appointment.  IV d/c'd with tip intact and dressing applied

## 2021-04-30 LAB
BACTERIA SPEC CULT: NORMAL
BACTERIA SPEC CULT: NORMAL
SERVICE CMNT-IMP: NORMAL
SERVICE CMNT-IMP: NORMAL

## 2021-06-01 ENCOUNTER — TRANSCRIBE ORDER (OUTPATIENT)
Dept: SCHEDULING | Age: 71
End: 2021-06-01

## 2021-06-01 DIAGNOSIS — K76.9 LIVER DISEASE, UNSPECIFIED: Primary | ICD-10-CM

## 2021-06-01 DIAGNOSIS — K80.50 CALCULUS OF BILE DUCT WITHOUT CHOLANGITIS OR CHOLECYSTITIS WITHOUT OBSTRUCTION: ICD-10-CM

## 2021-06-17 ENCOUNTER — HOSPITAL ENCOUNTER (OUTPATIENT)
Dept: MRI IMAGING | Age: 71
End: 2021-06-17
Attending: PHYSICIAN ASSISTANT

## 2021-08-17 PROBLEM — F13.20 SEDATIVE, HYPNOTIC OR ANXIOLYTIC DEPENDENCE, UNCOMPLICATED (HCC): Status: ACTIVE | Noted: 2021-08-17

## 2022-03-05 ENCOUNTER — APPOINTMENT (OUTPATIENT)
Dept: GENERAL RADIOLOGY | Age: 72
End: 2022-03-05
Attending: EMERGENCY MEDICINE
Payer: MEDICARE

## 2022-03-05 ENCOUNTER — HOSPITAL ENCOUNTER (EMERGENCY)
Age: 72
Discharge: HOME | End: 2022-03-05
Attending: EMERGENCY MEDICINE
Payer: MEDICARE

## 2022-03-05 VITALS
SYSTOLIC BLOOD PRESSURE: 164 MMHG | DIASTOLIC BLOOD PRESSURE: 105 MMHG | HEART RATE: 99 BPM | OXYGEN SATURATION: 94 % | RESPIRATION RATE: 22 BRPM | HEIGHT: 65 IN | WEIGHT: 105 LBS | TEMPERATURE: 98.2 F | BODY MASS INDEX: 17.49 KG/M2

## 2022-03-05 DIAGNOSIS — S63.501A SPRAIN OF RIGHT WRIST, INITIAL ENCOUNTER: ICD-10-CM
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
## 2022-03-05 DIAGNOSIS — S40.021A CONTUSION OF RIGHT UPPER EXTREMITY, INITIAL ENCOUNTER: ICD-10-CM

## 2022-03-05 PROCEDURE — 96374 THER/PROPH/DIAG INJ IV PUSH: CPT

## 2022-03-05 PROCEDURE — 73090 X-RAY EXAM OF FOREARM: CPT

## 2022-03-05 PROCEDURE — 99284 EMERGENCY DEPT VISIT MOD MDM: CPT

## 2022-03-05 PROCEDURE — 74011250636 HC RX REV CODE- 250/636: Performed by: EMERGENCY MEDICINE

## 2022-03-05 PROCEDURE — 96376 TX/PRO/DX INJ SAME DRUG ADON: CPT

## 2022-03-05 PROCEDURE — 96375 TX/PRO/DX INJ NEW DRUG ADDON: CPT

## 2022-03-05 PROCEDURE — 73080 X-RAY EXAM OF ELBOW: CPT

## 2022-03-05 PROCEDURE — 73130 X-RAY EXAM OF HAND: CPT

## 2022-03-05 RX ORDER — ONDANSETRON 2 MG/ML
4 INJECTION INTRAMUSCULAR; INTRAVENOUS
Status: COMPLETED | OUTPATIENT
Start: 2022-03-05 | End: 2022-03-05

## 2022-03-05 RX ORDER — HYDROMORPHONE HYDROCHLORIDE 1 MG/ML
0.5 INJECTION, SOLUTION INTRAMUSCULAR; INTRAVENOUS; SUBCUTANEOUS ONCE
Status: COMPLETED | OUTPATIENT
Start: 2022-03-05 | End: 2022-03-05

## 2022-03-05 RX ORDER — LORAZEPAM 2 MG/ML
1 INJECTION INTRAMUSCULAR
Status: COMPLETED | OUTPATIENT
Start: 2022-03-05 | End: 2022-03-05

## 2022-03-05 RX ADMIN — ONDANSETRON 4 MG: 2 INJECTION INTRAMUSCULAR; INTRAVENOUS at 22:13

## 2022-03-05 RX ADMIN — LORAZEPAM 1 MG: 2 INJECTION INTRAMUSCULAR; INTRAVENOUS at 22:49

## 2022-03-05 RX ADMIN — HYDROMORPHONE HYDROCHLORIDE 0.5 MG: 1 INJECTION, SOLUTION INTRAMUSCULAR; INTRAVENOUS; SUBCUTANEOUS at 22:49

## 2022-03-05 RX ADMIN — HYDROMORPHONE HYDROCHLORIDE 0.5 MG: 1 INJECTION, SOLUTION INTRAMUSCULAR; INTRAVENOUS; SUBCUTANEOUS at 22:13

## 2022-03-06 NOTE — ED TRIAGE NOTES
Pt presents to ED after fall c/o arm pain. Pt c/o right arm pain from elbow down. Palpable pulses and good cap refill in injured arm. Denies head injury or LOC. Masked.

## 2022-03-06 NOTE — DISCHARGE INSTRUCTIONS
Wear splint and sling for comfort  Expect to have some continued bruising and swelling  If symptoms persist, follow-up with orthopedic  Return to the ER for any new, worsening or life-threatening

## 2022-03-06 NOTE — ED PROVIDER NOTES
Patient presents to the ER with complaints of arm pain. Patient apparently had a fall earlier today. States she took a bad step and was falling backwards to use her right arm and hand to break her fall. Reports significant pain from her right wrist up to her elbow. Denies any head trauma loss of consciousness. The history is provided by the patient. Arm Pain   This is a new problem. The current episode started 1 to 2 hours ago. The problem occurs constantly. The problem has not changed since onset. The pain is present in the right hand, right wrist and right elbow. The quality of the pain is described as aching. The pain is at a severity of 5/10. The pain is moderate. Associated symptoms include limited range of motion. Pertinent negatives include no back pain and no neck pain. Past Medical History:   Diagnosis Date    Benign neoplasm of skin, site unspecified     BMI less than 19,adult     BMI 16.4    CCC (chronic calculous cholecystitis) 9/13/2018 11/13/18 s/p lap meghana and liver biopsy; Dr Mckeon Mask: GALLBLADDER: GALLBLADDER WITH ACUTE AND CHRONIC INFLAMMATION AND CHOLELITHIASIS. B: LIVER BIOPSY: HEPATIC PARENCHYMA WITH CIRRHOSIS (INFLAMMATORY GRADE: 2, FIBROSIS STAGE: 4). Electronically signed out on 11/14/2018 15:20 by Horacio Perez. Zaki Zayas M.D., Ph.D.  Microscopic Description  A: Histologic sections of the gallbladder wall d    Claustrophobia     Fibromyalgia     Gallstones 2/25/2015    Wt loss; Hep C 2/4/15 US   TECHNIQUE: Sonographic gray scale and Doppler images were obtained of the abdomen. FINDINGS: There are no discrete lesions in the visualized portions of the liver or spleen. The liver measures 18 cm and the spleen measures 14.5 cm. Limited visualization of the pancreas is unremarkable. The common bile duct diameter is 9 mm.  The gallbladder is distended measuring 11 cm in    Generalized anxiety disorder     Generalized hyperhidrosis     Hepatitis C     treated with no further complications    Lumbago     Migraine, unspecified, without mention of intractable migraine without mention of status migrainosus     managed with PRN medications    Motion sickness     Myalgia and myositis, unspecified     Other and unspecified nonspecific immunological findings     Personal history of tobacco use, presenting hazards to health     Scoliosis     Tobacco use disorder        Past Surgical History:   Procedure Laterality Date    ERCP  2021         HX APPENDECTOMY      HX  SECTION      HX COLONOSCOPY      HX ENDOSCOPY      HX ORTHOPAEDIC Left     knee    HX PELVIC LAPAROSCOPY      multiple due to endometriosis    HX ESPERANZA AND BSO      HX TONSIL AND ADENOIDECTOMY  as a child    HX UROLOGICAL      bladder/ bladder/ bladder sling         Family History:   Problem Relation Age of Onset    OSTEOARTHRITIS Mother     Cancer Mother         breast    Heart Disease Mother     Hypertension Mother     Breast Cancer Mother 80       Social History     Socioeconomic History    Marital status:      Spouse name: Not on file    Number of children: Not on file    Years of education: Not on file    Highest education level: Not on file   Occupational History    Not on file   Tobacco Use    Smoking status: Current Every Day Smoker     Packs/day: 1.00     Years: 35.00     Pack years: 35.00     Types: Cigarettes    Smokeless tobacco: Current User   Vaping Use    Vaping Use: Never used   Substance and Sexual Activity    Alcohol use: No     Alcohol/week: 0.0 standard drinks    Drug use: No    Sexual activity: Not on file   Other Topics Concern    Not on file   Social History Narrative    Not on file     Social Determinants of Health     Financial Resource Strain:     Difficulty of Paying Living Expenses: Not on file   Food Insecurity:     Worried About Running Out of Food in the Last Year: Not on file    Rosie of Food in the Last Year: Not on file Transportation Needs:     Lack of Transportation (Medical): Not on file    Lack of Transportation (Non-Medical): Not on file   Physical Activity:     Days of Exercise per Week: Not on file    Minutes of Exercise per Session: Not on file   Stress:     Feeling of Stress : Not on file   Social Connections:     Frequency of Communication with Friends and Family: Not on file    Frequency of Social Gatherings with Friends and Family: Not on file    Attends [employer] Services: Not on file    Active Member of [address] [place] [address] or Organizations: Not on file    Attends Club or Organization Meetings: Not on file    Marital Status: Not on file   Intimate Partner Violence:     Fear of Current or Ex-Partner: Not on file    Emotionally Abused: Not on file    Physically Abused: Not on file    Sexually Abused: Not on file   Housing Stability:     Unable to Pay for Housing in the Last Year: Not on file    Number of [place] in the Last Year: Not on file    Unstable Housing in the Last Year: Not on file         ALLERGIES: Biaxin [clarithromycin], Gluten, and Lactose    Review of Systems   Constitutional: Negative for fatigue and fever. HENT: Negative for congestion, dental problem, trouble swallowing and voice change. Eyes: Negative for photophobia, redness and visual disturbance. Respiratory: Negative for choking, chest tightness and shortness of breath. Cardiovascular: Negative for chest pain. Gastrointestinal: Negative for abdominal pain, blood in stool, rectal pain and vomiting. Endocrine: Negative for polyphagia and polyuria. Genitourinary: Negative for decreased urine volume and urgency. Musculoskeletal: Negative for back pain, gait problem, neck pain and neck stiffness. Skin: Negative for color change and pallor. Neurological: Negative for tremors, weakness and light-headedness. Hematological: Negative for adenopathy. Does not bruise/bleed easily.    Psychiatric/Behavioral: Negative for behavioral problems, confusion and self-injury. The patient is not nervous/anxious. All other systems reviewed and are negative. Vitals:    03/05/22 2133   BP: (!) 165/85   Pulse: 99   Resp: 22   Temp: 98.2 °F (36.8 °C)   SpO2: 92%   Weight: 47.6 kg (105 lb)   Height: 5' 5\" (1.651 m)            Physical Exam  Vitals and nursing note reviewed. Constitutional:       General: She is not in acute distress. Appearance: Normal appearance. She is not ill-appearing. HENT:      Head: Normocephalic and atraumatic. Right Ear: External ear normal.      Left Ear: External ear normal.      Nose: Nose normal. No congestion or rhinorrhea. Eyes:      General:         Right eye: No discharge. Left eye: No discharge. Cardiovascular:      Rate and Rhythm: Normal rate and regular rhythm. Pulses: Normal pulses. Heart sounds: Normal heart sounds. Pulmonary:      Effort: Pulmonary effort is normal.      Breath sounds: Normal breath sounds. No wheezing or rhonchi. Abdominal:      General: Abdomen is flat. There is no distension. Palpations: Abdomen is soft. There is no mass. Tenderness: There is no abdominal tenderness. Hernia: No hernia is present. Musculoskeletal:         General: No swelling or deformity. Right forearm: Tenderness present. Right wrist: Tenderness present. No lacerations. Normal range of motion. Right hand: Tenderness and bony tenderness present. Cervical back: Normal range of motion and neck supple. No rigidity or tenderness. Skin:     Capillary Refill: Capillary refill takes less than 2 seconds. Coloration: Skin is not jaundiced or pale. Findings: No bruising. Neurological:      General: No focal deficit present. Mental Status: She is alert and oriented to person, place, and time. Cranial Nerves: No cranial nerve deficit. Sensory: No sensory deficit.    Psychiatric:         Mood and Affect: Mood normal. Behavior: Behavior normal.          MDM  Number of Diagnoses or Management Options  Diagnosis management comments: Will obtain x-rays of the right hand, forearm and elbow    11:03 PM  X-ray showed no acute fractures, severe degenerative changes in the hand noted. Patient still in pain however it is noted that she takes oxycodone and fentanyl on a daily basis. We will try to redose and keep pain controlled here      11:26 PM  Symptoms better controlled. On examination she has no snuffbox tenderness, no pain with axial loading. However will place in Velcro wrist splint. Have her follow-up with orthopedics. Give sling for comfort. Amount and/or Complexity of Data Reviewed  Tests in the radiology section of CPT®: ordered and reviewed    Risk of Complications, Morbidity, and/or Mortality  Presenting problems: moderate  Diagnostic procedures: moderate  Management options: high  General comments: High risk due to multiple doses of parenteral narcotic medication           Procedures        Results Include:    No results found for this or any previous visit (from the past 24 hour(s)). Voice dictation software was used during the making of this note. This software is not perfect and grammatical and other typographical errors may be present. This note has been proofread, but may still contain errors. Reagan Lama MD; 3/5/2022 @11:26 PM   ===================================================================    I wore appropriate PPE throughout this patient's ED visit.  Reagan Lama MD, 11:26 PM

## 2022-03-06 NOTE — ED NOTES
I have reviewed discharge instructions with the patient. The patient verbalized understanding. Patient left ED via Discharge Method: ambulatory to Home with spouse. Opportunity for questions and clarification provided. Patient given 0 scripts. To continue your aftercare when you leave the hospital, you may receive an automated call from our care team to check in on how you are doing. This is a free service and part of our promise to provide the best care and service to meet your aftercare needs.  If you have questions, or wish to unsubscribe from this service please call [phone]. Thank you for Choosing our [facility].

## 2022-03-18 PROBLEM — K83.1 BILIARY OBSTRUCTION: Status: ACTIVE | Noted: 2021-04-25

## 2022-03-18 PROBLEM — F13.20 SEDATIVE, HYPNOTIC OR ANXIOLYTIC DEPENDENCE, UNCOMPLICATED (HCC): Status: ACTIVE | Noted: 2021-08-17

## 2022-03-18 PROBLEM — F11.20 CONTINUOUS OPIOID DEPENDENCE (HCC): Status: ACTIVE | Noted: 2018-10-08

## 2022-03-19 PROBLEM — K74.60 CIRRHOSIS OF LIVER WITH ASCITES (HCC): Status: ACTIVE | Noted: 2018-11-21

## 2022-03-19 PROBLEM — K76.9 LIVER LESION, RIGHT LOBE: Status: ACTIVE | Noted: 2021-04-25

## 2022-03-19 PROBLEM — E43 SEVERE PROTEIN-CALORIE MALNUTRITION (HCC): Status: ACTIVE | Noted: 2021-04-26

## 2022-03-19 PROBLEM — R18.8 CIRRHOSIS OF LIVER WITH ASCITES (HCC): Status: ACTIVE | Noted: 2018-11-21

## 2022-03-19 PROBLEM — K80.31 CHOLANGITIS DUE TO BILE DUCT CALCULUS WITH OBSTRUCTION: Status: ACTIVE | Noted: 2021-04-26

## 2022-03-19 PROBLEM — R10.9 ABDOMINAL PAIN: Status: ACTIVE | Noted: 2021-04-25

## 2022-03-19 PROBLEM — R63.4 RECENT UNINTENTIONAL WEIGHT LOSS OVER SEVERAL MONTHS: Status: ACTIVE | Noted: 2021-04-25

## 2022-05-31 ENCOUNTER — TELEPHONE (OUTPATIENT)
Dept: FAMILY MEDICINE CLINIC | Facility: CLINIC | Age: 72
End: 2022-05-31

## 2022-05-31 NOTE — TELEPHONE ENCOUNTER
Spoke with patient. She had a difficult time with tolerating medications when she started the full 25 mg of zoloft. She decreased to 12.5 mg one day and then the full dose the next and reports she is feeling better. I have advised she continue to the medications and schedule an ov with Dr. Greta Robins in 2-3 week to reevaluate symptoms. Verbalized understanding.       ----- Message from Varolii sent at 5/31/2022  2:25 PM EDT -----  Subject: Message to Provider    QUESTIONS  Information for Provider? Patient would like a call back regarding new   medication that she's not tolerating well; sertraline 25mg. Patient also   has some other issues she'd like to discuss but is being a little tight   lipped about them. Please call the patient ASAP.   ---------------------------------------------------------------------------  --------------  CALL BACK INFO  What is the best way for the office to contact you? OK to leave message on   voicemail  Preferred Call Back Phone Number? [phone]  ---------------------------------------------------------------------------  --------------  SCRIPT ANSWERS  Relationship to Patient?  Self

## 2022-06-02 ENCOUNTER — TELEMEDICINE (OUTPATIENT)
Dept: FAMILY MEDICINE CLINIC | Facility: CLINIC | Age: 72
End: 2022-06-02
Payer: MEDICARE

## 2022-06-02 DIAGNOSIS — F32.1 CURRENT MODERATE EPISODE OF MAJOR DEPRESSIVE DISORDER, UNSPECIFIED WHETHER RECURRENT (HCC): ICD-10-CM

## 2022-06-02 DIAGNOSIS — F41.1 GENERALIZED ANXIETY DISORDER: ICD-10-CM

## 2022-06-02 DIAGNOSIS — G89.4 CHRONIC PAIN SYNDROME: Primary | ICD-10-CM

## 2022-06-02 PROCEDURE — 1123F ACP DISCUSS/DSCN MKR DOCD: CPT | Performed by: FAMILY MEDICINE

## 2022-06-02 PROCEDURE — G8428 CUR MEDS NOT DOCUMENT: HCPCS | Performed by: FAMILY MEDICINE

## 2022-06-02 PROCEDURE — 99214 OFFICE O/P EST MOD 30 MIN: CPT | Performed by: FAMILY MEDICINE

## 2022-06-02 PROCEDURE — 1090F PRES/ABSN URINE INCON ASSESS: CPT | Performed by: FAMILY MEDICINE

## 2022-06-02 PROCEDURE — G8399 PT W/DXA RESULTS DOCUMENT: HCPCS | Performed by: FAMILY MEDICINE

## 2022-06-02 PROCEDURE — G8419 CALC BMI OUT NRM PARAM NOF/U: HCPCS | Performed by: FAMILY MEDICINE

## 2022-06-02 PROCEDURE — 3017F COLORECTAL CA SCREEN DOC REV: CPT | Performed by: FAMILY MEDICINE

## 2022-06-02 PROCEDURE — 4004F PT TOBACCO SCREEN RCVD TLK: CPT | Performed by: FAMILY MEDICINE

## 2022-06-02 RX ORDER — FENTANYL 75 UG/H
1 PATCH TRANSDERMAL
Qty: 10 PATCH | Refills: 0 | Status: SHIPPED | OUTPATIENT
Start: 2022-06-10 | End: 2022-07-05 | Stop reason: SDUPTHER

## 2022-06-02 RX ORDER — OXYCODONE HYDROCHLORIDE 5 MG/1
10 TABLET ORAL 4 TIMES DAILY PRN
Qty: 240 TABLET | Refills: 0 | Status: SHIPPED | OUTPATIENT
Start: 2022-06-17 | End: 2022-07-05 | Stop reason: SDUPTHER

## 2022-06-02 ASSESSMENT — ENCOUNTER SYMPTOMS
DIARRHEA: 0
NAUSEA: 0
SHORTNESS OF BREATH: 0
COUGH: 0
VOMITING: 0

## 2022-06-02 NOTE — PATIENT INSTRUCTIONS
Patient Education        Anxiety Disorder: Care Instructions  Your Care Instructions     Anxiety is a normal reaction to stress. Difficult situations can cause you to have symptoms such as sweaty palms and a nervous feeling. In an anxiety disorder, the symptoms are far more severe. Constant worry, muscle tension, trouble sleeping, nausea and diarrhea, and other symptoms can make normal daily activities difficult or impossible. These symptoms may occur for no reason, and they can affect your work, school, or social life. Medicines, counseling, and self-care can all help. Follow-up care is a key part of your treatment and safety. Be sure to make and go to all appointments, and call your doctor if you are having problems. It's also a good idea to know your test results and keep a list of the medicines you take. How can you care for yourself at home? · Take medicines exactly as directed. Call your doctor if you think you are having a problem with your medicine. · Go to your counseling sessions and follow-up appointments. · Recognize and accept your anxiety. Then, when you are in a situation that makes you anxious, say to yourself, \"This is not an emergency. I feel uncomfortable, but I am not in danger. I can keep going even if I feel anxious. \"  · Be kind to your body:  ? Relieve tension with exercise or a massage. ? Get enough rest.  ? Avoid alcohol, caffeine, nicotine, and illegal drugs. They can increase your anxiety level and cause sleep problems. ? Learn and do relaxation techniques. See below for more about these techniques. · Engage your mind. Get out and do something you enjoy. Go to a funny movie, or take a walk or hike. Plan your day. Having too much or too little to do can make you anxious. · Keep a record of your symptoms. Discuss your fears with a good friend or family member, or join a support group for people with similar problems. Talking to others sometimes relieves stress.   · Get involved in social groups, or volunteer to help others. Being alone sometimes makes things seem worse than they are. · Get at least 30 minutes of exercise on most days of the week to relieve stress. Walking is a good choice. You also may want to do other activities, such as running, swimming, cycling, or playing tennis or team sports. Relaxation techniques  Do relaxation exercises 10 to 20 minutes a day. You can play soothing, relaxing music while you do them, if you wish. · Tell others in your house that you are going to do your relaxation exercises. Ask them not to disturb you. · Find a comfortable place, away from all distractions and noise. · Lie down on your back, or sit with your back straight. · Focus on your breathing. Make it slow and steady. · Breathe in through your nose. Breathe out through either your nose or mouth. · Breathe deeply, filling up the area between your navel and your rib cage. Breathe so that your belly goes up and down. · Do not hold your breath. · Breathe like this for 5 to 10 minutes. Notice the feeling of calmness throughout your whole body. As you continue to breathe slowly and deeply, relax by doing the following for another 5 to 10 minutes:  · Tighten and relax each muscle group in your body. You can begin at your toes and work your way up to your head. · Imagine your muscle groups relaxing and becoming heavy. · Empty your mind of all thoughts. · Let yourself relax more and more deeply. · Become aware of the state of calmness that surrounds you. · When your relaxation time is over, you can bring yourself back to alertness by moving your fingers and toes and then your hands and feet and then stretching and moving your entire body. Sometimes people fall asleep during relaxation, but they usually wake up shortly afterward. · Always give yourself time to return to full alertness before you drive a car or do anything that might cause an accident if you are not fully alert.  Never play a relaxation tape while you drive a car. When should you call for help? Call 911 anytime you think you may need emergency care. For example, call if:    · You feel you cannot stop from hurting yourself or someone else. Keep the numbers for these national suicide hotlines: 4-059-100-TALK (8-630.917.3163) and 4-173-LRSGMCO (6-137.305.4039). If you or someone you know talks about suicide or feeling hopeless, get help right away. Watch closely for changes in your health, and be sure to contact your doctor if:    · You have anxiety or fear that affects your life.     · You have symptoms of anxiety that are new or different from those you had before. Where can you learn more? Go to https://MuutpeSourceThoughteb.A V.E.T.S.c.a.r.e.. org and sign in to your [employer] account. Enter P754 in the [employer] box to learn more about \"Anxiety Disorder: Care Instructions. \"     If you do not have an account, please click on the \"Sign Up Now\" link. Current as of: September 23, 2020               Content Version: 12.9  © 6450-1660 [employer]. Care instructions adapted under license by [address] If you have questions about a medical condition or this instruction, always ask your healthcare professional. [address] any warranty or liability for your use of this information. Patient Education        Depression and Chronic Disease: Care Instructions  Your Care Instructions     A chronic disease is one that you have for a long time. Some chronic diseases can be controlled, but they usually cannot be cured. Depression is common inpeople with chronic diseases, but it often goes unnoticed. Many people have concerns about seeking treatment for a mental health problem. You may think it's a sign of weakness, or you don't want people to know about it. It's important to overcome these reasons for not seeking treatment. Treating depression or anxiety is good for your health. Follow-up care is a key part of your treatment and safety. Be sure to make and go to all appointments, and call your doctor if you are having problems. It's also a good idea to know your test results and keep alist of the medicines you take. How can you care for yourself at home? Watch for symptoms of depression  The symptoms of depression are often subtle at first. You may think they are caused by your disease rather than depression. Or you may think it is normal lorrie depressed when you have a chronic disease. If you are depressed you may:   Feel sad or hopeless.  Feel guilty or worthless.  Not enjoy the things you used to enjoy.  Feel hopeless, as though life is not worth living.  Have trouble thinking or remembering.  Have low energy, and you may not eat or sleep well.  Pull away from others.  Think often about death or killing yourself. (Keep the numbers for these national suicide hotlines: 3-196-181-TALK [1-159.264.7693] and 7-750-XULQBYL [1-632.888.2666]. )  Get treatment  By treating your depression, you can feel more hopeful and have more energy. If you feel better, you may take better care of yourself, so your health mayimprove.  Talk to your doctor if you have any changes in mood during treatment for your disease.  Ask your doctor for help. Counseling, antidepressant medicine, or a combination of the two can help most people with depression. Often a combination works best. Counseling can also help you cope with having a chronic disease. When should you call for help? Call 911 anytime you think you may need emergency care. For example, call if:     You feel like hurting yourself or someone else.      Someone you know has depression and is about to attempt or is attempting suicide. Call your doctor now or seek immediate medical care if:     You hear voices.      Someone you know has depression and:  ? Starts to give away his or her possessions. ?  Uses illegal drugs or drinks alcohol heavily. ? Talks or writes about death, including writing suicide notes or talking about guns, knives, or pills. ? Starts to spend a lot of time alone. ? Acts very aggressively or suddenly appears calm. Watch closely for changes in your health, and be sure to contact your doctor if:     You do not get better as expected. Where can you learn more? Go to https://46elkspeElectroCore.Intent. org and sign in to your S[employer] account. Enter I422 in the [employer] box to learn more about \"Depression and Chronic Disease: Care Instructions. \"     If you do not have an account, please click on the \"Sign Up Now\" link. Current as of: June 16, 2021               Content Version: 13.2  © 9459-3179 Healthwise, Incorporated. Care instructions adapted under license by [facility] ([facility]). If you have questions about a medical condition or this instruction, always ask your healthcare professional. [place] [address] any warranty or liability for your use of this information.

## 2022-06-02 NOTE — PROGRESS NOTES
Yves Cruz (:  1950) is a Established patient, here for evaluation of the following:    Assessment & Plan   Below is the assessment and plan developed based on review of pertinent history, physical exam, labs, studies, and medications. 1. Chronic pain syndrome  -     fentaNYL (DURAGESIC) 75 MCG/HR; Place 1 patch onto the skin every 72 hours for 30 days. , Disp-10 patch, R-0Normal  2. Current moderate episode of major depressive disorder, unspecified whether recurrent ([address]      - Is on zoloft 25mg 1/2 tab qd currently and doing fair- will increase to 1 tab qd when tolerating then 1/2 tab better  3. Generalized anxiety disorder  Assessment & Plan:   Borderline controlled, continue current medications    No follow-ups on file. Subjective   Other  This is a chronic problem. The current episode started more than 1 year ago. The problem occurs constantly. The problem has been unchanged. Pertinent negatives include no chest pain, chills, coughing, fatigue, nausea or vomiting. Review of Systems   Constitutional: Negative for chills and fatigue. Respiratory: Negative for cough and shortness of breath. Cardiovascular: Negative for chest pain and leg swelling. Gastrointestinal: Negative for diarrhea, nausea and vomiting. Objective   Patient-Reported Vitals  No data recorded     Physical Exam- looks good, alert/oriented, not sedated, NAD             vYes Cruz, was evaluated through a synchronous (real-time) audio-video encounter. The patient (or guardian if applicable) is aware that this is a billable service, which includes applicable co-pays. This Virtual Visit was conducted with patient's (and/or legal guardian's) consent. The visit was conducted pursuant to the emergency declaration under the [facility], [address] [place] [address] authority and the [employer] and [employer] General Act.   Patient identification was verified, and a caregiver was present when appropriate. The patient was located at Home: [facility] [employer] [address] [facility] Provider was located at [facility] (Appt Dept): [address],  [address].         --Colleen Mims MD

## 2022-06-09 ENCOUNTER — TELEPHONE (OUTPATIENT)
Dept: FAMILY MEDICINE CLINIC | Facility: CLINIC | Age: 72
End: 2022-06-09

## 2022-06-09 NOTE — TELEPHONE ENCOUNTER
Pt is reminding you to call Raghavendra Iarheta at [address] [facility]. They close at 5:00.

## 2022-07-05 ENCOUNTER — TELEPHONE (OUTPATIENT)
Dept: FAMILY MEDICINE CLINIC | Facility: CLINIC | Age: 72
End: 2022-07-05

## 2022-07-05 DIAGNOSIS — F41.1 GAD (GENERALIZED ANXIETY DISORDER): ICD-10-CM

## 2022-07-05 DIAGNOSIS — G89.4 CHRONIC PAIN SYNDROME: ICD-10-CM

## 2022-07-05 DIAGNOSIS — F41.1 GAD (GENERALIZED ANXIETY DISORDER): Primary | ICD-10-CM

## 2022-07-05 RX ORDER — NALOXONE HYDROCHLORIDE 4 MG/.1ML
1 SPRAY NASAL PRN
Qty: 1 EACH | Refills: 5 | Status: SHIPPED | OUTPATIENT
Start: 2022-07-05

## 2022-07-05 RX ORDER — LORAZEPAM 1 MG/1
1 TABLET ORAL 3 TIMES DAILY PRN
Qty: 90 TABLET | Refills: 2 | Status: SHIPPED | OUTPATIENT
Start: 2022-07-13 | End: 2022-07-05

## 2022-07-05 RX ORDER — LORAZEPAM 1 MG/1
1 TABLET ORAL 3 TIMES DAILY PRN
Qty: 90 TABLET | Refills: 2 | Status: SHIPPED | OUTPATIENT
Start: 2022-07-13 | End: 2022-08-08 | Stop reason: SDUPTHER

## 2022-07-05 RX ORDER — FENTANYL 75 UG/H
1 PATCH TRANSDERMAL
Qty: 10 PATCH | Refills: 0 | Status: SHIPPED | OUTPATIENT
Start: 2022-07-10 | End: 2022-07-05

## 2022-07-05 RX ORDER — OXYCODONE HYDROCHLORIDE 5 MG/1
10 TABLET ORAL 4 TIMES DAILY PRN
Qty: 240 TABLET | Refills: 0 | Status: SHIPPED | OUTPATIENT
Start: 2022-07-17 | End: 2022-08-08 | Stop reason: SDUPTHER

## 2022-07-05 RX ORDER — LORAZEPAM 1 MG/1
1 TABLET ORAL EVERY EVENING
Qty: 30 TABLET | Refills: 2 | Status: SHIPPED | OUTPATIENT
Start: 2022-07-13 | End: 2022-08-08 | Stop reason: SDUPTHER

## 2022-07-05 RX ORDER — FENTANYL 75 UG/H
1 PATCH TRANSDERMAL
Qty: 10 PATCH | Refills: 0 | Status: SHIPPED | OUTPATIENT
Start: 2022-07-10 | End: 2022-08-08 | Stop reason: SDUPTHER

## 2022-07-05 RX ORDER — OXYCODONE HYDROCHLORIDE 5 MG/1
10 TABLET ORAL 4 TIMES DAILY PRN
Qty: 240 TABLET | Refills: 0 | Status: SHIPPED | OUTPATIENT
Start: 2022-07-17 | End: 2022-07-05

## 2022-07-05 RX ORDER — LORAZEPAM 1 MG/1
1 TABLET ORAL EVERY EVENING
Qty: 30 TABLET | Refills: 2 | Status: SHIPPED | OUTPATIENT
Start: 2022-07-13 | End: 2022-07-05

## 2022-08-08 ENCOUNTER — OFFICE VISIT (OUTPATIENT)
Dept: FAMILY MEDICINE CLINIC | Facility: CLINIC | Age: 72
End: 2022-08-08
Payer: MEDICARE

## 2022-08-08 VITALS
HEART RATE: 70 BPM | TEMPERATURE: 98 F | DIASTOLIC BLOOD PRESSURE: 88 MMHG | OXYGEN SATURATION: 98 % | SYSTOLIC BLOOD PRESSURE: 136 MMHG | HEIGHT: 65 IN | BODY MASS INDEX: 16.26 KG/M2 | WEIGHT: 97.6 LBS

## 2022-08-08 DIAGNOSIS — R18.8 CIRRHOSIS OF LIVER WITH ASCITES, UNSPECIFIED HEPATIC CIRRHOSIS TYPE (HCC): ICD-10-CM

## 2022-08-08 DIAGNOSIS — K83.1 BILIARY OBSTRUCTION: ICD-10-CM

## 2022-08-08 DIAGNOSIS — G89.4 CHRONIC PAIN SYNDROME: ICD-10-CM

## 2022-08-08 DIAGNOSIS — K80.31 CHOLANGITIS DUE TO BILE DUCT CALCULUS WITH OBSTRUCTION: ICD-10-CM

## 2022-08-08 DIAGNOSIS — F13.20 SEDATIVE, HYPNOTIC OR ANXIOLYTIC DEPENDENCE, UNCOMPLICATED (HCC): ICD-10-CM

## 2022-08-08 DIAGNOSIS — F17.200 TOBACCO USE DISORDER: ICD-10-CM

## 2022-08-08 DIAGNOSIS — K74.60 CIRRHOSIS OF LIVER WITH ASCITES, UNSPECIFIED HEPATIC CIRRHOSIS TYPE (HCC): ICD-10-CM

## 2022-08-08 DIAGNOSIS — F11.20 CONTINUOUS OPIOID DEPENDENCE (HCC): ICD-10-CM

## 2022-08-08 DIAGNOSIS — B18.2 CHRONIC HEPATITIS C WITHOUT HEPATIC COMA (HCC): ICD-10-CM

## 2022-08-08 DIAGNOSIS — M15.9 PRIMARY OSTEOARTHRITIS INVOLVING MULTIPLE JOINTS: ICD-10-CM

## 2022-08-08 DIAGNOSIS — F41.1 GAD (GENERALIZED ANXIETY DISORDER): ICD-10-CM

## 2022-08-08 DIAGNOSIS — F41.1 GENERALIZED ANXIETY DISORDER: ICD-10-CM

## 2022-08-08 DIAGNOSIS — M54.50 CHRONIC LOW BACK PAIN, UNSPECIFIED BACK PAIN LATERALITY, UNSPECIFIED WHETHER SCIATICA PRESENT: ICD-10-CM

## 2022-08-08 DIAGNOSIS — K76.6 PORTAL HYPERTENSION (HCC): Primary | ICD-10-CM

## 2022-08-08 DIAGNOSIS — M79.7 FIBROMYALGIA: ICD-10-CM

## 2022-08-08 DIAGNOSIS — G89.29 CHRONIC LOW BACK PAIN, UNSPECIFIED BACK PAIN LATERALITY, UNSPECIFIED WHETHER SCIATICA PRESENT: ICD-10-CM

## 2022-08-08 LAB
11-NOR-9-THC-9-COOH, POC: NEGATIVE
ALBUMIN SERPL-MCNC: 4.4 G/DL (ref 3.2–4.6)
ALBUMIN/GLOB SERPL: 1.3 {RATIO} (ref 1.2–3.5)
ALP SERPL-CCNC: 98 U/L (ref 50–136)
ALT SERPL-CCNC: 27 U/L (ref 12–65)
AMPHETAMINE, URINE, POC: NEGATIVE
ANION GAP SERPL CALC-SCNC: 1 MMOL/L (ref 7–16)
AST SERPL-CCNC: 25 U/L (ref 15–37)
BASOPHILS # BLD: 0 K/UL (ref 0–0.2)
BASOPHILS NFR BLD: 1 % (ref 0–2)
BENZODIAZEPINES, URINE, POC: ABNORMAL
BENZOYLECGONINE, URINE, POC: NEGATIVE
BILIRUB SERPL-MCNC: 0.9 MG/DL (ref 0.2–1.1)
BUN SERPL-MCNC: 12 MG/DL (ref 8–23)
CALCIUM SERPL-MCNC: 9.2 MG/DL (ref 8.3–10.4)
CHLORIDE SERPL-SCNC: 105 MMOL/L (ref 98–107)
CO2 SERPL-SCNC: 32 MMOL/L (ref 21–32)
CREAT SERPL-MCNC: 0.7 MG/DL (ref 0.6–1)
DIFFERENTIAL METHOD BLD: ABNORMAL
EOSINOPHIL # BLD: 0 K/UL (ref 0–0.8)
EOSINOPHIL NFR BLD: 1 % (ref 0.5–7.8)
ERYTHROCYTE [DISTWIDTH] IN BLOOD BY AUTOMATED COUNT: 12.8 % (ref 11.9–14.6)
GLOBULIN SER CALC-MCNC: 3.5 G/DL (ref 2.3–3.5)
GLUCOSE SERPL-MCNC: 83 MG/DL (ref 65–100)
HCT VFR BLD AUTO: 46.5 % (ref 35.8–46.3)
HGB BLD-MCNC: 15.3 G/DL (ref 11.7–15.4)
IMM GRANULOCYTES # BLD AUTO: 0 K/UL (ref 0–0.5)
IMM GRANULOCYTES NFR BLD AUTO: 0 % (ref 0–5)
LYMPHOCYTES # BLD: 2.2 K/UL (ref 0.5–4.6)
LYMPHOCYTES NFR BLD: 38 % (ref 13–44)
MCH RBC QN AUTO: 31.4 PG (ref 26.1–32.9)
MCHC RBC AUTO-ENTMCNC: 32.9 G/DL (ref 31.4–35)
MCV RBC AUTO: 95.5 FL (ref 79.6–97.8)
METHADONE, URINE, POC: NEGATIVE
METHAMPHETAMINE, URINE, POC: NEGATIVE
MONOCYTES # BLD: 0.6 K/UL (ref 0.1–1.3)
MONOCYTES NFR BLD: 9 % (ref 4–12)
MORPHINE, URINE, POC: NEGATIVE
NEUTS SEG # BLD: 3 K/UL (ref 1.7–8.2)
NEUTS SEG NFR BLD: 51 % (ref 43–78)
NRBC # BLD: 0 K/UL (ref 0–0.2)
PHENCYCLIDINE, URINE, POC: NEGATIVE
PLATELET # BLD AUTO: 153 K/UL (ref 150–450)
PMV BLD AUTO: 11.5 FL (ref 9.4–12.3)
POTASSIUM SERPL-SCNC: 4.3 MMOL/L (ref 3.5–5.1)
PROT SERPL-MCNC: 7.9 G/DL (ref 6.3–8.2)
RBC # BLD AUTO: 4.87 M/UL (ref 4.05–5.2)
SODIUM SERPL-SCNC: 138 MMOL/L (ref 136–145)
URINALYSIS COLOR, POC: YELLOW
WBC # BLD AUTO: 5.9 K/UL (ref 4.3–11.1)

## 2022-08-08 PROCEDURE — G8428 CUR MEDS NOT DOCUMENT: HCPCS | Performed by: FAMILY MEDICINE

## 2022-08-08 PROCEDURE — G8399 PT W/DXA RESULTS DOCUMENT: HCPCS | Performed by: FAMILY MEDICINE

## 2022-08-08 PROCEDURE — 99214 OFFICE O/P EST MOD 30 MIN: CPT | Performed by: FAMILY MEDICINE

## 2022-08-08 PROCEDURE — G8419 CALC BMI OUT NRM PARAM NOF/U: HCPCS | Performed by: FAMILY MEDICINE

## 2022-08-08 PROCEDURE — 3017F COLORECTAL CA SCREEN DOC REV: CPT | Performed by: FAMILY MEDICINE

## 2022-08-08 PROCEDURE — 1090F PRES/ABSN URINE INCON ASSESS: CPT | Performed by: FAMILY MEDICINE

## 2022-08-08 PROCEDURE — 1123F ACP DISCUSS/DSCN MKR DOCD: CPT | Performed by: FAMILY MEDICINE

## 2022-08-08 PROCEDURE — 4004F PT TOBACCO SCREEN RCVD TLK: CPT | Performed by: FAMILY MEDICINE

## 2022-08-08 PROCEDURE — 80305 DRUG TEST PRSMV DIR OPT OBS: CPT | Performed by: FAMILY MEDICINE

## 2022-08-08 RX ORDER — SERTRALINE HYDROCHLORIDE 25 MG/1
TABLET, FILM COATED ORAL
COMMUNITY
Start: 2022-05-11

## 2022-08-08 RX ORDER — LORAZEPAM 1 MG/1
1 TABLET ORAL 3 TIMES DAILY PRN
Qty: 90 TABLET | Refills: 2 | Status: SHIPPED | OUTPATIENT
Start: 2022-08-13 | End: 2022-09-12

## 2022-08-08 RX ORDER — OXYCODONE HYDROCHLORIDE 5 MG/1
10 TABLET ORAL 4 TIMES DAILY PRN
Qty: 240 TABLET | Refills: 0 | Status: SHIPPED | OUTPATIENT
Start: 2022-08-16 | End: 2022-08-31 | Stop reason: SDUPTHER

## 2022-08-08 RX ORDER — FENTANYL 75 UG/H
1 PATCH TRANSDERMAL
Qty: 10 PATCH | Refills: 0 | Status: SHIPPED | OUTPATIENT
Start: 2022-08-10 | End: 2022-08-31 | Stop reason: SDUPTHER

## 2022-08-08 RX ORDER — LORAZEPAM 1 MG/1
1 TABLET ORAL EVERY EVENING
Qty: 30 TABLET | Refills: 2 | Status: SHIPPED | OUTPATIENT
Start: 2022-08-13 | End: 2022-09-12

## 2022-08-08 RX ORDER — NALOXONE HYDROCHLORIDE 4 MG/.1ML
1 SPRAY NASAL PRN
Qty: 1 EACH | Refills: 5 | Status: SHIPPED | OUTPATIENT
Start: 2022-08-08

## 2022-08-08 ASSESSMENT — ENCOUNTER SYMPTOMS
DIARRHEA: 0
SHORTNESS OF BREATH: 0
COUGH: 0
NAUSEA: 0
VOMITING: 0

## 2022-08-08 ASSESSMENT — PATIENT HEALTH QUESTIONNAIRE - PHQ9
SUM OF ALL RESPONSES TO PHQ QUESTIONS 1-9: 2
SUM OF ALL RESPONSES TO PHQ9 QUESTIONS 1 & 2: 2
SUM OF ALL RESPONSES TO PHQ QUESTIONS 1-9: 2
SUM OF ALL RESPONSES TO PHQ QUESTIONS 1-9: 2
1. LITTLE INTEREST OR PLEASURE IN DOING THINGS: 1
SUM OF ALL RESPONSES TO PHQ QUESTIONS 1-9: 2
2. FEELING DOWN, DEPRESSED OR HOPELESS: 1

## 2022-08-08 NOTE — PROGRESS NOTES
Josue Dumont (:  1950) is a 71 y.o. female,Established patient, here for evaluation of the following chief complaint(s):  Follow-up, Medication Refill (Pain meds), and Other (Liver disease/portal HTN, ), LARISSA- needs med refills and UDS         ASSESSMENT/PLAN:  1. Portal hypertension (HCC)  Assessment & Plan:   Borderline controlled, continue current medications  Orders:  -     Comprehensive Metabolic Panel; Future  2. Biliary obstruction  Assessment & Plan:   Monitored by specialist- no acute findings meriting change in the plan  Orders:  -     Comprehensive Metabolic Panel; Future  -     CBC with Auto Differential; Future  3. Cholangitis due to bile duct calculus with obstruction  Assessment & Plan:   Monitored by specialist- no acute findings meriting change in the plan  Orders:  -     Comprehensive Metabolic Panel; Future  -     CBC with Auto Differential; Future  4. Chronic hepatitis C without hepatic coma (HCC)  Assessment & Plan:   Monitored by specialist- no acute findings meriting change in the plan  Orders:  -     Comprehensive Metabolic Panel; Future  5. Cirrhosis of liver with ascites, unspecified hepatic cirrhosis type [facility])  Assessment & Plan:   Monitored by specialist- no acute findings meriting change in the plan  Orders:  -     Comprehensive Metabolic Panel; Future  6. Primary osteoarthritis involving multiple joints  Assessment & Plan:   Borderline controlled, continue current medications  Orders:  -     Comprehensive Metabolic Panel; Future  -     CBC with Auto Differential; Future  7. Fibromyalgia  Assessment & Plan:   Borderline controlled, continue current medications  Orders:  -     AMB POC DRUG SCREEN, URINE  8. Chronic low back pain, unspecified back pain laterality, unspecified whether sciatica present  Assessment & Plan:   Borderline controlled, continue current medications  Orders:  -     AMB POC DRUG SCREEN, URINE  9.  Chronic pain syndrome  Assessment & Plan:   Borderline controlled, continue current medications  Orders:  -     AMB POC DRUG SCREEN, URINE  -     fentaNYL (DURAGESIC) 75 MCG/HR; Place 1 patch onto the skin every 72 hours for 30 days. May fill 1-2 days early if necessary due to pharmacy hours, Disp-10 patch, R-0Normal  -     oxyCODONE (ROXICODONE) 5 MG immediate release tablet; Take 2 tablets by mouth 4 times daily as needed for Pain for up to 30 days. May fill one to two days early if necessary due to pharmacy hours, Disp-240 tablet, R-0Normal  10. Continuous opioid dependence ([facility] [address]  Assessment & Plan:   Borderline controlled, continue current medications  Orders:  -     AMB POC DRUG SCREEN, URINE= + oxy, Benzo, cup does not test for fentanyl  - I have reviewed the patients controlled substance prescription history, as maintained in the [address] [place] [address] prescription monitoring program, so that the prescription(s) for a  controlled substance can be given. - Patient is aware of addictive potential of medication.   - Patient is aware of respiratory suppression and is not experiencing any sedation with medication.   - Patient denies sedation or other side effects from medication  - Patient is instructed on compliance with dosing schedule and acknowledges that no early refill will be provided in the event of non-compliance, theft or loss of medication.   - Patient is aware that they may be subject to random drug testing and pill counts. This is a chronic problem that is stable. Per review of available records and patients , there are not sign of overuse, misuse, diversion, or concerning side effects. Today we reviewed: the goals of treatment (improve functionality, quality of life, and pain) alternative treatment options including non-narcotic modalities The following changes were made to the patients current treatment plan: nothing, medications refilled. Use heat as adjunct     11.  BMI less than 19,adult  Assessment & Plan:   Borderline controlled, lifestyle modifications recommended  BMI handout  12. Generalized anxiety disorder  Assessment & Plan:   Borderline controlled, continue current medications  13. Sedative, hypnotic or anxiolytic dependence, uncomplicated (HCC)  Assessment & Plan:   Borderline controlled, continue current medications  14. Tobacco use disorder  Assessment & Plan:   Unclear control, continue current medications  Cessation handout  15. LARISSA (generalized anxiety disorder)  -     LORazepam (ATIVAN) 1 MG tablet; Take 1 tablet by mouth 3 times daily as needed for Anxiety for up to 30 days. TAKE 1 TABLET BY MOUTH three TIMES DAILY AS NEEDED FOR ANXIETY( MAXIMUM DAILY DOSE IS 4 TABLETS) Indications: anxious may fill 1-2 days early if necessary due to pharmacy hours, Disp-90 tablet, R-2Normal  -     LORazepam (ATIVAN) 1 MG tablet; Take 1 tablet by mouth every evening for 30 days. , Disp-30 tablet, R-2Normal      Return in about 3 months (around 11/8/2022) for office followup/recheck. Subjective   SUBJECTIVE/OBJECTIVE:  Other  This is a chronic problem. The current episode started more than 1 year ago. The problem occurs constantly. The problem has been unchanged. Pertinent negatives include no chest pain, chills, coughing, fatigue, nausea or vomiting. The symptoms are aggravated by twisting, bending, walking, standing and exertion. The treatment provided significant relief. Review of Systems   Constitutional:  Negative for chills and fatigue. Respiratory:  Negative for cough and shortness of breath. Cardiovascular:  Negative for chest pain and leg swelling. Gastrointestinal:  Negative for diarrhea, nausea and vomiting. Objective   Physical Exam  Vitals and nursing note reviewed. Constitutional:       General: She is not in acute distress. Appearance: Normal appearance. HENT:      Head: Normocephalic and atraumatic. Nose: Nose normal.      Mouth/Throat:      Pharynx: Oropharynx is clear.    Eyes:      Extraocular Movements: Extraocular movements intact. Conjunctiva/sclera: Conjunctivae normal.   Cardiovascular:      Rate and Rhythm: Normal rate and regular rhythm. Pulses: Normal pulses. Heart sounds: Normal heart sounds. Pulmonary:      Effort: Pulmonary effort is normal.      Breath sounds: Normal breath sounds. Musculoskeletal:         General: No swelling or tenderness. Normal range of motion. Cervical back: Normal range of motion and neck supple. Skin:     General: Skin is warm and dry. Neurological:      General: No focal deficit present. Mental Status: She is alert. Mental status is at baseline. Psychiatric:         Mood and Affect: Mood normal.         Behavior: Behavior normal.              An electronic signature was used to authenticate this note.     --Felicia So MD

## 2022-08-31 DIAGNOSIS — F41.1 GAD (GENERALIZED ANXIETY DISORDER): ICD-10-CM

## 2022-08-31 DIAGNOSIS — G89.4 CHRONIC PAIN SYNDROME: ICD-10-CM

## 2022-08-31 RX ORDER — OXYCODONE HYDROCHLORIDE 5 MG/1
10 TABLET ORAL 4 TIMES DAILY PRN
Qty: 240 TABLET | Refills: 0 | Status: SHIPPED | OUTPATIENT
Start: 2022-09-15 | End: 2022-10-04 | Stop reason: SDUPTHER

## 2022-08-31 RX ORDER — FENTANYL 75 UG/H
1 PATCH TRANSDERMAL
Qty: 10 PATCH | Refills: 0 | Status: SHIPPED | OUTPATIENT
Start: 2022-09-09 | End: 2022-10-04 | Stop reason: SDUPTHER

## 2022-08-31 NOTE — TELEPHONE ENCOUNTER
----- Message from \[facility]\"" GEOVANNA Walker sent at 8/31/2022 11:37 AM EDT -----  Subject: Refill Request    QUESTIONS  Name of Medication? fentaNYL (DURAGESIC) 75 MCG/HR  Patient-reported dosage and instructions? 75 mcg wear one every three days     How many days do you have left? 2  Preferred Pharmacy? [facility]  Pharmacy phone number (if available)? [phone]  ---------------------------------------------------------------------------  --------------,  Name of Medication? LORazepam (ATIVAN) 1 MG tablet  Patient-reported dosage and instructions? 1 mg   How many days do you have left? 10  Preferred Pharmacy? [facility]  Pharmacy phone number (if available)? [phone]  ---------------------------------------------------------------------------  --------------,  Name of Medication? LORazepam (ATIVAN) 1 MG tablet  Patient-reported dosage and instructions? 1 mg tablet  How many days do you have left? 10  Preferred Pharmacy? [facility] phone number (if available)? [phone]  ---------------------------------------------------------------------------  --------------,  Name of Medication? oxyCODONE (ROXICODONE) 5 MG immediate release tablet  Patient-reported dosage and instructions? 5 mg take four times a day  How many days do you have left? 0  Preferred Pharmacy? [facility]  Pharmacy phone number (if available)? [phone]  ---------------------------------------------------------------------------  --------------  Ky Fraction INFO  What is the best way for the office to contact you? OK to leave message on   voicemail  Preferred Call Back Phone Number? [phone]  ---------------------------------------------------------------------------  --------------  SCRIPT ANSWERS  Relationship to Patient?  Self

## 2022-09-07 ENCOUNTER — TELEPHONE (OUTPATIENT)
Dept: FAMILY MEDICINE CLINIC | Facility: CLINIC | Age: 72
End: 2022-09-07

## 2022-10-04 DIAGNOSIS — G89.4 CHRONIC PAIN SYNDROME: ICD-10-CM

## 2022-10-04 RX ORDER — FENTANYL 75 UG/H
1 PATCH TRANSDERMAL
Qty: 10 PATCH | Refills: 0 | Status: SHIPPED | OUTPATIENT
Start: 2022-10-09 | End: 2022-10-20 | Stop reason: SDUPTHER

## 2022-10-04 RX ORDER — OXYCODONE HYDROCHLORIDE 5 MG/1
10 TABLET ORAL 4 TIMES DAILY PRN
Qty: 240 TABLET | Refills: 0 | Status: SHIPPED | OUTPATIENT
Start: 2022-10-15 | End: 2022-11-02 | Stop reason: SDUPTHER

## 2022-10-19 ENCOUNTER — TELEPHONE (OUTPATIENT)
Dept: FAMILY MEDICINE CLINIC | Facility: CLINIC | Age: 72
End: 2022-10-19

## 2022-10-19 NOTE — TELEPHONE ENCOUNTER
Pt needs a refill on Fentanyl pain patch. The ones she got was different and she had a reaction to it. Benjie now has Fentanyl and she would like for you to call it in for her.

## 2022-10-20 ENCOUNTER — TELEPHONE (OUTPATIENT)
Dept: FAMILY MEDICINE CLINIC | Facility: CLINIC | Age: 72
End: 2022-10-20

## 2022-10-20 DIAGNOSIS — G89.4 CHRONIC PAIN SYNDROME: ICD-10-CM

## 2022-10-20 RX ORDER — FENTANYL 75 UG/H
1 PATCH TRANSDERMAL
Qty: 10 PATCH | Refills: 0 | Status: SHIPPED | OUTPATIENT
Start: 2022-10-20 | End: 2022-11-02 | Stop reason: SDUPTHER

## 2022-10-20 NOTE — TELEPHONE ENCOUNTER
Pt called back and said the pharmacy gave her the wrong patch which caused a reaction and she is out and wants to see if you will approve for the pharmacy to fill it.

## 2022-10-20 NOTE — TELEPHONE ENCOUNTER
----- Message from Cathi Golden sent at 10/20/2022 12:15 PM EDT -----  Subject: Message to Provider    QUESTIONS  Information for Provider? Sam Cerna called regarding message from   yesterday about her prescription for the Fentanyl patch. She states that   the pharmacy gave her the wrong one that causes a reaction after a while. She says that the pharmacy has the correct kind on at the pharmacy now and   that PCP just needs to sign off on it. Per  was handling another   patient and said they would send a message back to PCP.  Please f/u as soon   as possible.  ---------------------------------------------------------------------------  --------------  Feliz TORRES  [phone]; OK to leave message on voicemail  ---------------------------------------------------------------------------  --------------  SCRIPT ANSWERS  undefined

## 2022-11-02 ENCOUNTER — OFFICE VISIT (OUTPATIENT)
Dept: FAMILY MEDICINE CLINIC | Facility: CLINIC | Age: 72
End: 2022-11-02
Payer: MEDICARE

## 2022-11-02 VITALS
WEIGHT: 100.2 LBS | BODY MASS INDEX: 16.69 KG/M2 | SYSTOLIC BLOOD PRESSURE: 138 MMHG | HEIGHT: 65 IN | HEART RATE: 78 BPM | DIASTOLIC BLOOD PRESSURE: 84 MMHG | OXYGEN SATURATION: 98 % | TEMPERATURE: 98 F

## 2022-11-02 DIAGNOSIS — M79.7 FIBROMYALGIA: ICD-10-CM

## 2022-11-02 DIAGNOSIS — K74.60 CIRRHOSIS OF LIVER WITH ASCITES, UNSPECIFIED HEPATIC CIRRHOSIS TYPE (HCC): ICD-10-CM

## 2022-11-02 DIAGNOSIS — G89.4 CHRONIC PAIN SYNDROME: ICD-10-CM

## 2022-11-02 DIAGNOSIS — R18.8 CIRRHOSIS OF LIVER WITH ASCITES, UNSPECIFIED HEPATIC CIRRHOSIS TYPE (HCC): ICD-10-CM

## 2022-11-02 DIAGNOSIS — F17.200 TOBACCO USE DISORDER: ICD-10-CM

## 2022-11-02 DIAGNOSIS — Z79.899 MEDICATION MANAGEMENT: ICD-10-CM

## 2022-11-02 DIAGNOSIS — Z12.31 ENCOUNTER FOR SCREENING MAMMOGRAM FOR BREAST CANCER: ICD-10-CM

## 2022-11-02 DIAGNOSIS — F41.1 GAD (GENERALIZED ANXIETY DISORDER): ICD-10-CM

## 2022-11-02 DIAGNOSIS — M15.9 PRIMARY OSTEOARTHRITIS INVOLVING MULTIPLE JOINTS: Primary | ICD-10-CM

## 2022-11-02 DIAGNOSIS — K76.6 PORTAL HYPERTENSION (HCC): ICD-10-CM

## 2022-11-02 PROBLEM — R89.4 OTHER AND UNSPECIFIED NONSPECIFIC IMMUNOLOGICAL FINDINGS: Status: ACTIVE | Noted: 2022-11-02

## 2022-11-02 PROCEDURE — 99214 OFFICE O/P EST MOD 30 MIN: CPT | Performed by: FAMILY MEDICINE

## 2022-11-02 PROCEDURE — 4004F PT TOBACCO SCREEN RCVD TLK: CPT | Performed by: FAMILY MEDICINE

## 2022-11-02 PROCEDURE — G8419 CALC BMI OUT NRM PARAM NOF/U: HCPCS | Performed by: FAMILY MEDICINE

## 2022-11-02 PROCEDURE — 1090F PRES/ABSN URINE INCON ASSESS: CPT | Performed by: FAMILY MEDICINE

## 2022-11-02 PROCEDURE — 1123F ACP DISCUSS/DSCN MKR DOCD: CPT | Performed by: FAMILY MEDICINE

## 2022-11-02 PROCEDURE — 3017F COLORECTAL CA SCREEN DOC REV: CPT | Performed by: FAMILY MEDICINE

## 2022-11-02 PROCEDURE — G8427 DOCREV CUR MEDS BY ELIG CLIN: HCPCS | Performed by: FAMILY MEDICINE

## 2022-11-02 PROCEDURE — G8399 PT W/DXA RESULTS DOCUMENT: HCPCS | Performed by: FAMILY MEDICINE

## 2022-11-02 PROCEDURE — G8484 FLU IMMUNIZE NO ADMIN: HCPCS | Performed by: FAMILY MEDICINE

## 2022-11-02 RX ORDER — OXYCODONE HYDROCHLORIDE 5 MG/1
10 TABLET ORAL 4 TIMES DAILY PRN
Qty: 240 TABLET | Refills: 0 | Status: SHIPPED | OUTPATIENT
Start: 2022-11-14 | End: 2022-12-14

## 2022-11-02 RX ORDER — LORAZEPAM 1 MG/1
TABLET ORAL
Qty: 30 TABLET | Refills: 2 | Status: SHIPPED | OUTPATIENT
Start: 2022-11-10 | End: 2023-02-08

## 2022-11-02 RX ORDER — SUMATRIPTAN 50 MG/1
50 TABLET, FILM COATED ORAL
Qty: 9 TABLET | Status: CANCELLED | OUTPATIENT
Start: 2022-11-02 | End: 2022-11-02

## 2022-11-02 RX ORDER — SERTRALINE HYDROCHLORIDE 25 MG/1
TABLET, FILM COATED ORAL
Qty: 30 TABLET | Refills: 3 | Status: CANCELLED | OUTPATIENT
Start: 2022-11-02

## 2022-11-02 RX ORDER — FENTANYL 75 UG/H
1 PATCH TRANSDERMAL
Qty: 10 PATCH | Refills: 0 | Status: SHIPPED | OUTPATIENT
Start: 2022-11-18 | End: 2022-12-18

## 2022-11-02 RX ORDER — LORAZEPAM 1 MG/1
TABLET ORAL
COMMUNITY
Start: 2022-10-11 | End: 2022-11-02 | Stop reason: SDUPTHER

## 2022-11-02 ASSESSMENT — ENCOUNTER SYMPTOMS
NAUSEA: 0
COUGH: 0
VOMITING: 0
SHORTNESS OF BREATH: 0
DIARRHEA: 0

## 2022-11-02 ASSESSMENT — PATIENT HEALTH QUESTIONNAIRE - PHQ9
SUM OF ALL RESPONSES TO PHQ QUESTIONS 1-9: 2
SUM OF ALL RESPONSES TO PHQ QUESTIONS 1-9: 2
SUM OF ALL RESPONSES TO PHQ9 QUESTIONS 1 & 2: 2
SUM OF ALL RESPONSES TO PHQ QUESTIONS 1-9: 2
SUM OF ALL RESPONSES TO PHQ QUESTIONS 1-9: 2
1. LITTLE INTEREST OR PLEASURE IN DOING THINGS: 1
2. FEELING DOWN, DEPRESSED OR HOPELESS: 1

## 2022-11-02 NOTE — PROGRESS NOTES
Nicole Thorne (:  1950) is a 71 y.o. female,Established patient, here for evaluation of the following chief complaint(s):  Hypertension (/)         ASSESSMENT/PLAN:  1. Primary osteoarthritis involving multiple joints  Assessment & Plan:   Borderline controlled, continue current medications- severe pain, chronic  2. Fibromyalgia- chronic/stable  3. Chronic pain syndrome  -     fentaNYL (DURAGESIC) 75 MCG/HR; Place 1 patch onto the skin every 72 hours for 30 days. May fill 1-2 days early if necessary due to pharmacy hours, Disp-10 patch, R-0Normal  -     oxyCODONE (ROXICODONE) 5 MG immediate release tablet; Take 2 tablets by mouth 4 times daily as needed for Pain for up to 30 days. May fill one to two days early if necessary due to pharmacy hours, Disp-240 tablet, R-0Normal  - I have reviewed the patients controlled substance prescription history, as maintained in the [address] [place] [address] prescription monitoring program, so that the prescription(s) for a  controlled substance can be given. - Patient is aware of addictive potential of medication.   - Patient is aware of respiratory suppression and is not experiencing any sedation with medication.   - Patient denies sedation or other side effects from medication  - Patient is instructed on compliance with dosing schedule and acknowledges that no early refill will be provided in the event of non-compliance, theft or loss of medication.   - Patient is aware that they may be subject to random drug testing and pill counts. This is a chronic problem that is stable. Per review of available records and patients , there are not sign of overuse, misuse, diversion, or concerning side effects. Today we reviewed: the goals of treatment (improve functionality, quality of life, and pain) alternative treatment options including non-narcotic modalities The following changes were made to the patients current treatment plan: nothing, medications refilled.      4. Portal hypertension ([facility] [address]- fu with gi as scheduled  5. Cirrhosis of liver with ascites, unspecified hepatic cirrhosis type ([facility] [address]  Assessment & Plan:   Borderline controlled, continue current medications- followed by GI  6. Adult BMI <19 kg/sq m      BMI handout  7. Tobacco use disorder  Assessment & Plan:   Uncontrolled, lifestyle modifications recommended  Cessation handouts  8. Encounter for screening mammogram for breast cancer  -     DAYSI DIGITAL SCREEN W OR WO CAD BILATERAL; Future  9. LARISSA (generalized anxiety disorder)  -     RF LORazepam (ATIVAN) 1 MG tablet; TAKE 1 TABLET BY MOUTH EVERY EVENING, Disp-30 tablet, R-2Normal      Return in about 3 months (around 2/2/2023) for office followup/recheck. Subjective   SUBJECTIVE/OBJECTIVE:  Other  This is a chronic problem. The current episode started more than 1 year ago. The problem occurs constantly. The problem has been unchanged. Pertinent negatives include no chest pain, chills, coughing, fatigue, nausea or vomiting. Review of Systems   Constitutional:  Negative for chills and fatigue. Respiratory:  Negative for cough and shortness of breath. Cardiovascular:  Negative for chest pain and leg swelling. Gastrointestinal:  Negative for diarrhea, nausea and vomiting. Objective   Physical Exam  Vitals and nursing note reviewed. Constitutional:       General: She is not in acute distress. Appearance: Normal appearance. HENT:      Head: Normocephalic and atraumatic. Nose: Nose normal.      Mouth/Throat:      Pharynx: Oropharynx is clear. Eyes:      Extraocular Movements: Extraocular movements intact. Conjunctiva/sclera: Conjunctivae normal.   Cardiovascular:      Rate and Rhythm: Normal rate and regular rhythm. Pulses: Normal pulses. Heart sounds: Normal heart sounds. Pulmonary:      Effort: Pulmonary effort is normal.      Breath sounds: Normal breath sounds.    Musculoskeletal:         General: No swelling or tenderness. Normal range of motion. Cervical back: Normal range of motion and neck supple. Skin:     General: Skin is warm and dry. Neurological:      General: No focal deficit present. Mental Status: She is alert. Mental status is at baseline. Psychiatric:         Mood and Affect: Mood normal.         Behavior: Behavior normal.              An electronic signature was used to authenticate this note.     --Greg Hansen MD

## 2022-12-08 DIAGNOSIS — F41.1 GAD (GENERALIZED ANXIETY DISORDER): ICD-10-CM

## 2022-12-08 DIAGNOSIS — G89.4 CHRONIC PAIN SYNDROME: ICD-10-CM

## 2022-12-08 RX ORDER — FENTANYL 75 UG/H
1 PATCH TRANSDERMAL
Qty: 10 PATCH | Refills: 0 | Status: CANCELLED | OUTPATIENT
Start: 2022-12-08 | End: 2023-01-07

## 2022-12-08 RX ORDER — LORAZEPAM 1 MG/1
TABLET ORAL
Qty: 30 TABLET | Refills: 2 | Status: CANCELLED | OUTPATIENT
Start: 2022-12-08 | End: 2023-03-08

## 2022-12-08 RX ORDER — FENTANYL 75 UG/H
1 PATCH TRANSDERMAL
Qty: 10 PATCH | Refills: 0 | Status: SHIPPED | OUTPATIENT
Start: 2022-12-18 | End: 2023-01-17

## 2022-12-08 RX ORDER — LORAZEPAM 1 MG/1
1 TABLET ORAL 3 TIMES DAILY PRN
Qty: 90 TABLET | Refills: 2 | Status: SHIPPED | OUTPATIENT
Start: 2022-12-10 | End: 2023-01-09

## 2022-12-08 RX ORDER — LORAZEPAM 1 MG/1
TABLET ORAL
Qty: 30 TABLET | Refills: 2 | Status: SHIPPED | OUTPATIENT
Start: 2022-12-10 | End: 2023-03-08

## 2022-12-08 RX ORDER — OXYCODONE HYDROCHLORIDE 5 MG/1
10 TABLET ORAL 4 TIMES DAILY PRN
Qty: 240 TABLET | Refills: 0 | Status: CANCELLED | OUTPATIENT
Start: 2022-12-08 | End: 2023-01-07

## 2022-12-08 RX ORDER — OXYCODONE HYDROCHLORIDE 5 MG/1
10 TABLET ORAL 4 TIMES DAILY PRN
Qty: 240 TABLET | Refills: 0 | Status: SHIPPED | OUTPATIENT
Start: 2022-12-14 | End: 2023-01-13

## 2023-01-09 DIAGNOSIS — G89.4 CHRONIC PAIN SYNDROME: ICD-10-CM

## 2023-01-09 DIAGNOSIS — F41.1 GAD (GENERALIZED ANXIETY DISORDER): ICD-10-CM

## 2023-01-09 RX ORDER — OXYCODONE HYDROCHLORIDE 5 MG/1
10 TABLET ORAL 4 TIMES DAILY PRN
Qty: 240 TABLET | Refills: 0 | Status: SHIPPED | OUTPATIENT
Start: 2023-01-13 | End: 2023-02-07 | Stop reason: SDUPTHER

## 2023-01-09 RX ORDER — LORAZEPAM 1 MG/1
1 TABLET ORAL 3 TIMES DAILY PRN
Qty: 90 TABLET | Refills: 2 | Status: SHIPPED | OUTPATIENT
Start: 2023-01-09 | End: 2023-02-08

## 2023-01-09 RX ORDER — FENTANYL 75 UG/H
1 PATCH TRANSDERMAL
Qty: 10 PATCH | Refills: 0 | Status: SHIPPED | OUTPATIENT
Start: 2023-01-18 | End: 2023-01-18 | Stop reason: SDUPTHER

## 2023-01-09 RX ORDER — LORAZEPAM 1 MG/1
TABLET ORAL
Qty: 30 TABLET | Refills: 2 | Status: SHIPPED | OUTPATIENT
Start: 2023-01-09 | End: 2023-04-07

## 2023-01-18 ENCOUNTER — TELEPHONE (OUTPATIENT)
Dept: FAMILY MEDICINE CLINIC | Facility: CLINIC | Age: 73
End: 2023-01-18

## 2023-01-18 DIAGNOSIS — G89.4 CHRONIC PAIN SYNDROME: ICD-10-CM

## 2023-01-18 RX ORDER — FENTANYL 75 UG/H
1 PATCH TRANSDERMAL
Qty: 10 PATCH | Refills: 0 | Status: SHIPPED | OUTPATIENT
Start: 2023-01-18 | End: 2023-02-17

## 2023-01-18 NOTE — TELEPHONE ENCOUNTER
Patient called and stated that the pharmacy needs to be with you in regards to the refill for her Duragesic 75 mcg/hr medication. Pharmacy stated that they sent something through the fax. She would like it handled today if possible as the medication is due today.     It is the [facility] on [address].  [phone]    Thank you

## 2023-02-07 ENCOUNTER — OFFICE VISIT (OUTPATIENT)
Dept: FAMILY MEDICINE CLINIC | Facility: CLINIC | Age: 73
End: 2023-02-07
Payer: MEDICARE

## 2023-02-07 VITALS
DIASTOLIC BLOOD PRESSURE: 86 MMHG | BODY MASS INDEX: 16.66 KG/M2 | HEART RATE: 97 BPM | SYSTOLIC BLOOD PRESSURE: 138 MMHG | RESPIRATION RATE: 18 BRPM | WEIGHT: 100 LBS | OXYGEN SATURATION: 95 % | TEMPERATURE: 98 F | HEIGHT: 65 IN

## 2023-02-07 DIAGNOSIS — K76.6 PORTAL HYPERTENSION (HCC): ICD-10-CM

## 2023-02-07 DIAGNOSIS — G89.4 CHRONIC PAIN SYNDROME: ICD-10-CM

## 2023-02-07 DIAGNOSIS — M15.9 PRIMARY OSTEOARTHRITIS INVOLVING MULTIPLE JOINTS: Primary | ICD-10-CM

## 2023-02-07 DIAGNOSIS — F11.20 CONTINUOUS OPIOID DEPENDENCE (HCC): ICD-10-CM

## 2023-02-07 DIAGNOSIS — E43 UNSPECIFIED SEVERE PROTEIN-CALORIE MALNUTRITION (HCC): ICD-10-CM

## 2023-02-07 DIAGNOSIS — F32.1 CURRENT MODERATE EPISODE OF MAJOR DEPRESSIVE DISORDER, UNSPECIFIED WHETHER RECURRENT (HCC): ICD-10-CM

## 2023-02-07 DIAGNOSIS — F17.200 TOBACCO USE DISORDER: ICD-10-CM

## 2023-02-07 DIAGNOSIS — R18.8 CIRRHOSIS OF LIVER WITH ASCITES, UNSPECIFIED HEPATIC CIRRHOSIS TYPE (HCC): ICD-10-CM

## 2023-02-07 DIAGNOSIS — D58.2 OTHER HEMOGLOBINOPATHIES (HCC): ICD-10-CM

## 2023-02-07 DIAGNOSIS — F41.1 GAD (GENERALIZED ANXIETY DISORDER): ICD-10-CM

## 2023-02-07 DIAGNOSIS — M79.7 FIBROMYALGIA: ICD-10-CM

## 2023-02-07 DIAGNOSIS — K74.60 CIRRHOSIS OF LIVER WITH ASCITES, UNSPECIFIED HEPATIC CIRRHOSIS TYPE (HCC): ICD-10-CM

## 2023-02-07 LAB
11-NOR-9-THC-9-COOH, POC: NEGATIVE
AMPHETAMINE, URINE, POC: NEGATIVE
BENZODIAZEPINES, URINE, POC: ABNORMAL
BENZOYLECGONINE, URINE, POC: NEGATIVE
METHADONE, URINE, POC: ABNORMAL
METHAMPHETAMINE, URINE, POC: NEGATIVE
MORPHINE, URINE, POC: NEGATIVE
PHENCYCLIDINE, URINE, POC: NEGATIVE
URINALYSIS COLOR, POC: YELLOW

## 2023-02-07 PROCEDURE — 1123F ACP DISCUSS/DSCN MKR DOCD: CPT | Performed by: FAMILY MEDICINE

## 2023-02-07 PROCEDURE — G8428 CUR MEDS NOT DOCUMENT: HCPCS | Performed by: FAMILY MEDICINE

## 2023-02-07 PROCEDURE — 99214 OFFICE O/P EST MOD 30 MIN: CPT | Performed by: FAMILY MEDICINE

## 2023-02-07 PROCEDURE — G8419 CALC BMI OUT NRM PARAM NOF/U: HCPCS | Performed by: FAMILY MEDICINE

## 2023-02-07 PROCEDURE — 4004F PT TOBACCO SCREEN RCVD TLK: CPT | Performed by: FAMILY MEDICINE

## 2023-02-07 PROCEDURE — 3017F COLORECTAL CA SCREEN DOC REV: CPT | Performed by: FAMILY MEDICINE

## 2023-02-07 PROCEDURE — 1090F PRES/ABSN URINE INCON ASSESS: CPT | Performed by: FAMILY MEDICINE

## 2023-02-07 PROCEDURE — 80305 DRUG TEST PRSMV DIR OPT OBS: CPT | Performed by: FAMILY MEDICINE

## 2023-02-07 PROCEDURE — G8399 PT W/DXA RESULTS DOCUMENT: HCPCS | Performed by: FAMILY MEDICINE

## 2023-02-07 PROCEDURE — G8484 FLU IMMUNIZE NO ADMIN: HCPCS | Performed by: FAMILY MEDICINE

## 2023-02-07 RX ORDER — LORAZEPAM 1 MG/1
TABLET ORAL
Qty: 30 TABLET | Refills: 2 | Status: SHIPPED | OUTPATIENT
Start: 2023-02-09 | End: 2023-05-06

## 2023-02-07 RX ORDER — FENTANYL 75 UG/H
1 PATCH TRANSDERMAL
Qty: 10 PATCH | Refills: 0 | Status: SHIPPED | OUTPATIENT
Start: 2023-02-18 | End: 2023-03-20

## 2023-02-07 RX ORDER — LORAZEPAM 1 MG/1
1 TABLET ORAL 3 TIMES DAILY PRN
Qty: 90 TABLET | Refills: 2 | Status: SHIPPED | OUTPATIENT
Start: 2023-02-10 | End: 2023-03-12

## 2023-02-07 RX ORDER — OXYCODONE HYDROCHLORIDE 5 MG/1
10 TABLET ORAL 4 TIMES DAILY PRN
Qty: 240 TABLET | Refills: 0 | Status: SHIPPED | OUTPATIENT
Start: 2023-02-12 | End: 2023-03-14

## 2023-02-07 RX ORDER — NALOXONE HYDROCHLORIDE 4 MG/.1ML
1 SPRAY NASAL PRN
Qty: 1 EACH | Refills: 5 | Status: SHIPPED | OUTPATIENT
Start: 2023-02-07

## 2023-02-07 SDOH — ECONOMIC STABILITY: INCOME INSECURITY: HOW HARD IS IT FOR YOU TO PAY FOR THE VERY BASICS LIKE FOOD, HOUSING, MEDICAL CARE, AND HEATING?: NOT HARD AT ALL

## 2023-02-07 SDOH — ECONOMIC STABILITY: HOUSING INSECURITY
IN THE LAST 12 MONTHS, WAS THERE A TIME WHEN YOU DID NOT HAVE A STEADY PLACE TO SLEEP OR SLEPT IN A SHELTER (INCLUDING NOW)?: NO

## 2023-02-07 SDOH — ECONOMIC STABILITY: FOOD INSECURITY: WITHIN THE PAST 12 MONTHS, THE FOOD YOU BOUGHT JUST DIDN'T LAST AND YOU DIDN'T HAVE MONEY TO GET MORE.: NEVER TRUE

## 2023-02-07 SDOH — ECONOMIC STABILITY: FOOD INSECURITY: WITHIN THE PAST 12 MONTHS, YOU WORRIED THAT YOUR FOOD WOULD RUN OUT BEFORE YOU GOT MONEY TO BUY MORE.: NEVER TRUE

## 2023-02-07 ASSESSMENT — PATIENT HEALTH QUESTIONNAIRE - PHQ9
9. THOUGHTS THAT YOU WOULD BE BETTER OFF DEAD, OR OF HURTING YOURSELF: 0
SUM OF ALL RESPONSES TO PHQ QUESTIONS 1-9: 0
3. TROUBLE FALLING OR STAYING ASLEEP: 0
SUM OF ALL RESPONSES TO PHQ QUESTIONS 1-9: 0
1. LITTLE INTEREST OR PLEASURE IN DOING THINGS: 0
2. FEELING DOWN, DEPRESSED OR HOPELESS: 0
10. IF YOU CHECKED OFF ANY PROBLEMS, HOW DIFFICULT HAVE THESE PROBLEMS MADE IT FOR YOU TO DO YOUR WORK, TAKE CARE OF THINGS AT HOME, OR GET ALONG WITH OTHER PEOPLE: 0
6. FEELING BAD ABOUT YOURSELF - OR THAT YOU ARE A FAILURE OR HAVE LET YOURSELF OR YOUR FAMILY DOWN: 0
8. MOVING OR SPEAKING SO SLOWLY THAT OTHER PEOPLE COULD HAVE NOTICED. OR THE OPPOSITE, BEING SO FIGETY OR RESTLESS THAT YOU HAVE BEEN MOVING AROUND A LOT MORE THAN USUAL: 0
7. TROUBLE CONCENTRATING ON THINGS, SUCH AS READING THE NEWSPAPER OR WATCHING TELEVISION: 0
4. FEELING TIRED OR HAVING LITTLE ENERGY: 0
5. POOR APPETITE OR OVEREATING: 0
SUM OF ALL RESPONSES TO PHQ QUESTIONS 1-9: 0
SUM OF ALL RESPONSES TO PHQ QUESTIONS 1-9: 0
SUM OF ALL RESPONSES TO PHQ9 QUESTIONS 1 & 2: 0

## 2023-02-07 ASSESSMENT — ENCOUNTER SYMPTOMS
VOMITING: 0
SHORTNESS OF BREATH: 0
COUGH: 0
NAUSEA: 0
DIARRHEA: 0

## 2023-02-07 NOTE — Clinical Note
Pt said she faxed forms last week for her meds PA with insurance.  I included relevent info in todays Prog note, please contact her insurance to get this resolved

## 2023-02-07 NOTE — PROGRESS NOTES
Юлия Clemente (:  1950) is a 71 y.o. female,Established patient, here for evaluation of the following chief complaint(s):  Follow-up (Chronic care follow up. )- needs refills of pain meds and lorazepam. She has been on current meds/doses for years with no sign of abuse, overdosing, intoxication. She has Rx for Naloxone NS incase of accidental overdose, but has NOT needed it. Urine drug screen done 2x/year and is consistent with her meds         ASSESSMENT/PLAN:  1. Primary osteoarthritis involving multiple joints  -     AMB POC DRUG SCREEN, URINE  2. Fibromyalgia  -     Comprehensive Metabolic Panel; Future  3. Chronic pain syndrome  -     AMB POC DRUG SCREEN, URINE= +oxy, benzo (fentanyl not tested on cup)  -     oxyCODONE (ROXICODONE) 5 MG immediate release tablet; Take 2 tablets by mouth 4 times daily as needed for Pain for up to 30 days. May fill one to two days early if necessary due to pharmacy hours, Disp-240 tablet, R-0Normal  -     fentaNYL (DURAGESIC) 75 MCG/HR; Place 1 patch onto the skin every 72 hours for 30 days. May fill 1-2 days early if necessary due to pharmacy hours, Disp-10 patch, R-0Normal  4. Continuous opioid dependence (HCC)  -     naloxone 4 MG/0.1ML LIQD nasal spray; 1 spray by Nasal route as needed for Opioid Reversal, Disp-1 each, R-5Normal  - I have reviewed the patients controlled substance prescription history, as maintained in the [address] [place] [address] prescription monitoring program, so that the prescription(s) for a  controlled substance can be given.   - Patient is aware of addictive potential of medication.   - Patient is aware of respiratory suppression and is not experiencing any sedation with medication.   - Patient denies sedation or other side effects from medication  - Patient is instructed on compliance with dosing schedule and acknowledges that no early refill will be provided in the event of non-compliance, theft or loss of medication.   - Patient is aware that they may be subject to random drug testing and pill counts. This is a chronic problem that is stable. Per review of available records and patients , there are not sign of overuse, misuse, diversion, or concerning side effects. Today we reviewed: the goals of treatment (improve functionality, quality of life, and pain) alternative treatment options including non-narcotic modalities The following changes were made to the patients current treatment plan: nothing, medications refilled. Use heat as adjunct to pain meds. 5. Cirrhosis of liver with ascites, unspecified hepatic cirrhosis type ([facility]  -     CBC with Auto Differential; Future  -     Comprehensive Metabolic Panel; Future  6. Portal hypertension (HCC)  -     CBC with Auto Differential; Future  7. Other hemoglobinopathies ([facility]  8. Unspecified severe protein-calorie malnutrition ([facility]  9. Tobacco use disorder  Assessment & Plan:   Uncontrolled, lifestyle modifications recommended  10. Current moderate episode of major depressive disorder, unspecified whether recurrent ([facility]  11. LARISSA (generalized anxiety disorder)  -     LORazepam (ATIVAN) 1 MG tablet; Take 1 tablet by mouth 3 times daily as needed for Anxiety for up to 30 days. TAKE 1 TABLET BY MOUTH three TIMES DAILY AS NEEDED FOR ANXIETY( MAXIMUM DAILY DOSE IS 4 TABLETS) Indications: anxious may fill 1-2 days early if necessary due to pharmacy hours, Disp-90 tablet, R-2Normal  -     LORazepam (ATIVAN) 1 MG tablet; TAKE 1 TABLET BY MOUTH EVERY EVENING, Disp-30 tablet, R-2Normal      Return in about 3 months (around 5/7/2023). Subjective   SUBJECTIVE/OBJECTIVE:  Other  This is a chronic problem. The current episode started more than 1 year ago. The problem occurs constantly. The problem has been unchanged. Pertinent negatives include no chest pain, chills, coughing, fatigue, nausea or vomiting. Review of Systems   Constitutional:  Negative for chills and fatigue.    Respiratory:  Negative for cough and shortness of breath. Cardiovascular:  Negative for chest pain and leg swelling. Gastrointestinal:  Negative for diarrhea, nausea and vomiting. Objective   Physical Exam  Vitals and nursing note reviewed. Constitutional:       General: She is not in acute distress. Appearance: Normal appearance. HENT:      Head: Normocephalic and atraumatic. Nose: Nose normal.      Mouth/Throat:      Pharynx: Oropharynx is clear. Eyes:      Extraocular Movements: Extraocular movements intact. Conjunctiva/sclera: Conjunctivae normal.   Cardiovascular:      Rate and Rhythm: Normal rate and regular rhythm. Pulses: Normal pulses. Heart sounds: Normal heart sounds. Pulmonary:      Effort: Pulmonary effort is normal.      Breath sounds: Normal breath sounds. Musculoskeletal:         General: No swelling or tenderness. Normal range of motion. Cervical back: Normal range of motion and neck supple. Skin:     General: Skin is warm and dry. Neurological:      General: No focal deficit present. Mental Status: She is alert. Mental status is at baseline. Psychiatric:         Mood and Affect: Mood normal.         Behavior: Behavior normal.              An electronic signature was used to authenticate this note.     --Gala Daniels MD

## 2023-02-08 ENCOUNTER — TELEPHONE (OUTPATIENT)
Dept: FAMILY MEDICINE CLINIC | Facility: CLINIC | Age: 73
End: 2023-02-08

## 2023-02-08 NOTE — TELEPHONE ENCOUNTER
02/07/23:    Nena Caba.MD Adin MA  Pt said she faxed forms last week for her meds PA with insurance. I included relevent info in todays Prog note, please contact her insurance to get this resolved         02/08/23:    [facility] then was disconnected. Was on the phone w/ [employer] for 22:06 minutes and was advised the appeal has already been started by me and that the fax number to send to [address] [place] [address] to is [phone]. The appeal has 127 hours left. I have faxed the OV to the above number.

## 2023-03-07 ENCOUNTER — TELEPHONE (OUTPATIENT)
Dept: FAMILY MEDICINE CLINIC | Facility: CLINIC | Age: 73
End: 2023-03-07

## 2023-03-07 DIAGNOSIS — F41.1 GAD (GENERALIZED ANXIETY DISORDER): ICD-10-CM

## 2023-03-07 DIAGNOSIS — G89.4 CHRONIC PAIN SYNDROME: ICD-10-CM

## 2023-03-07 RX ORDER — OXYCODONE HYDROCHLORIDE 5 MG/1
10 TABLET ORAL 4 TIMES DAILY PRN
Qty: 240 TABLET | Refills: 0 | Status: SHIPPED | OUTPATIENT
Start: 2023-03-14 | End: 2023-04-13

## 2023-03-07 RX ORDER — FENTANYL 75 UG/H
1 PATCH TRANSDERMAL
Qty: 10 PATCH | Refills: 0 | Status: SHIPPED | OUTPATIENT
Start: 2023-03-20 | End: 2023-04-19

## 2023-03-07 RX ORDER — LORAZEPAM 1 MG/1
TABLET ORAL
Qty: 30 TABLET | Refills: 2 | Status: SHIPPED | OUTPATIENT
Start: 2023-03-11 | End: 2023-06-01

## 2023-03-07 RX ORDER — LORAZEPAM 1 MG/1
1 TABLET ORAL 3 TIMES DAILY PRN
Qty: 90 TABLET | Refills: 2 | Status: SHIPPED | OUTPATIENT
Start: 2023-03-11 | End: 2023-04-10

## 2023-04-06 DIAGNOSIS — F41.1 GAD (GENERALIZED ANXIETY DISORDER): ICD-10-CM

## 2023-04-06 DIAGNOSIS — G89.4 CHRONIC PAIN SYNDROME: ICD-10-CM

## 2023-04-06 RX ORDER — FENTANYL 75 UG/H
1 PATCH TRANSDERMAL
Qty: 10 PATCH | Refills: 0 | Status: SHIPPED | OUTPATIENT
Start: 2023-04-19 | End: 2023-05-19

## 2023-04-06 RX ORDER — LORAZEPAM 1 MG/1
1 TABLET ORAL 3 TIMES DAILY PRN
Qty: 90 TABLET | Refills: 2 | Status: SHIPPED | OUTPATIENT
Start: 2023-04-10 | End: 2023-05-10

## 2023-04-06 RX ORDER — LORAZEPAM 1 MG/1
TABLET ORAL
Qty: 30 TABLET | Refills: 2 | Status: SHIPPED | OUTPATIENT
Start: 2023-04-10 | End: 2023-06-27

## 2023-04-06 RX ORDER — OXYCODONE HYDROCHLORIDE 5 MG/1
10 TABLET ORAL 4 TIMES DAILY PRN
Qty: 240 TABLET | Refills: 0 | Status: SHIPPED | OUTPATIENT
Start: 2023-04-13 | End: 2023-05-13

## 2023-05-09 ENCOUNTER — OFFICE VISIT (OUTPATIENT)
Dept: FAMILY MEDICINE CLINIC | Facility: CLINIC | Age: 73
End: 2023-05-09
Payer: MEDICARE

## 2023-05-09 VITALS
HEIGHT: 65 IN | DIASTOLIC BLOOD PRESSURE: 60 MMHG | SYSTOLIC BLOOD PRESSURE: 122 MMHG | HEART RATE: 87 BPM | WEIGHT: 98.8 LBS | TEMPERATURE: 98 F | OXYGEN SATURATION: 95 % | BODY MASS INDEX: 16.46 KG/M2

## 2023-05-09 DIAGNOSIS — M15.9 PRIMARY OSTEOARTHRITIS INVOLVING MULTIPLE JOINTS: ICD-10-CM

## 2023-05-09 DIAGNOSIS — G89.29 CHRONIC LOW BACK PAIN, UNSPECIFIED BACK PAIN LATERALITY, UNSPECIFIED WHETHER SCIATICA PRESENT: ICD-10-CM

## 2023-05-09 DIAGNOSIS — R18.8 CIRRHOSIS OF LIVER WITH ASCITES, UNSPECIFIED HEPATIC CIRRHOSIS TYPE (HCC): ICD-10-CM

## 2023-05-09 DIAGNOSIS — R16.1 SPLENOMEGALY: ICD-10-CM

## 2023-05-09 DIAGNOSIS — M79.7 FIBROMYALGIA: ICD-10-CM

## 2023-05-09 DIAGNOSIS — M54.50 CHRONIC LOW BACK PAIN, UNSPECIFIED BACK PAIN LATERALITY, UNSPECIFIED WHETHER SCIATICA PRESENT: ICD-10-CM

## 2023-05-09 DIAGNOSIS — F41.1 GAD (GENERALIZED ANXIETY DISORDER): ICD-10-CM

## 2023-05-09 DIAGNOSIS — K74.60 CIRRHOSIS OF LIVER WITH ASCITES, UNSPECIFIED HEPATIC CIRRHOSIS TYPE (HCC): ICD-10-CM

## 2023-05-09 DIAGNOSIS — G89.4 CHRONIC PAIN SYNDROME: Primary | ICD-10-CM

## 2023-05-09 DIAGNOSIS — F17.200 TOBACCO USE DISORDER: ICD-10-CM

## 2023-05-09 DIAGNOSIS — F11.20 CONTINUOUS OPIOID DEPENDENCE (HCC): ICD-10-CM

## 2023-05-09 PROCEDURE — 1123F ACP DISCUSS/DSCN MKR DOCD: CPT | Performed by: FAMILY MEDICINE

## 2023-05-09 PROCEDURE — 3017F COLORECTAL CA SCREEN DOC REV: CPT | Performed by: FAMILY MEDICINE

## 2023-05-09 PROCEDURE — G8427 DOCREV CUR MEDS BY ELIG CLIN: HCPCS | Performed by: FAMILY MEDICINE

## 2023-05-09 PROCEDURE — 4004F PT TOBACCO SCREEN RCVD TLK: CPT | Performed by: FAMILY MEDICINE

## 2023-05-09 PROCEDURE — 99214 OFFICE O/P EST MOD 30 MIN: CPT | Performed by: FAMILY MEDICINE

## 2023-05-09 PROCEDURE — G8419 CALC BMI OUT NRM PARAM NOF/U: HCPCS | Performed by: FAMILY MEDICINE

## 2023-05-09 PROCEDURE — 1090F PRES/ABSN URINE INCON ASSESS: CPT | Performed by: FAMILY MEDICINE

## 2023-05-09 PROCEDURE — G8399 PT W/DXA RESULTS DOCUMENT: HCPCS | Performed by: FAMILY MEDICINE

## 2023-05-09 RX ORDER — FENTANYL 75 UG/H
1 PATCH TRANSDERMAL
Qty: 10 PATCH | Refills: 0 | Status: SHIPPED | OUTPATIENT
Start: 2023-05-19 | End: 2023-06-18

## 2023-05-09 RX ORDER — OXYCODONE HYDROCHLORIDE 5 MG/1
10 TABLET ORAL 4 TIMES DAILY PRN
Qty: 240 TABLET | Refills: 0 | Status: SHIPPED | OUTPATIENT
Start: 2023-05-13 | End: 2023-06-12

## 2023-05-09 RX ORDER — LORAZEPAM 1 MG/1
1 TABLET ORAL 3 TIMES DAILY PRN
Qty: 90 TABLET | Refills: 2 | Status: SHIPPED | OUTPATIENT
Start: 2023-05-10 | End: 2023-06-09

## 2023-05-09 ASSESSMENT — ENCOUNTER SYMPTOMS
NAUSEA: 0
VOMITING: 0
DIARRHEA: 0
SHORTNESS OF BREATH: 0
COUGH: 0

## 2023-05-09 NOTE — ASSESSMENT & PLAN NOTE
Borderline controlled, continue current medications
Detail Level: Detailed
Monitored by specialist- no acute findings meriting change in the plan
Uncontrolled, lifestyle modifications recommended
Uncontrolled, lifestyle modifications recommended
Detail Level: Zone

## 2023-05-09 NOTE — PROGRESS NOTES
Filemon Aragon (:  1950) is a 71 y.o. female,Established patient, here for evaluation of the following chief complaint(s):  Medication Refill and Follow-up- chronic pain/LARISSA- needs meds refilled         ASSESSMENT/PLAN:  1. Chronic pain syndrome  -     LORazepam (ATIVAN) 1 MG tablet; Take 1 tablet by mouth 3 times daily as needed for Anxiety for up to 30 days. TAKE 1 TABLET BY MOUTH three TIMES DAILY AS NEEDED FOR ANXIETY( MAXIMUM DAILY DOSE IS 4 TABLETS) Indications: anxious may fill 1-2 days early if necessary due to pharmacy hours, Disp-90 tablet, R-2Normal  -     oxyCODONE (ROXICODONE) 5 MG immediate release tablet; Take 2 tablets by mouth 4 times daily as needed for Pain for up to 30 days. May fill one to two days early if necessary due to pharmacy hours, Disp-240 tablet, R-0Normal  -     fentaNYL (DURAGESIC) 75 MCG/HR; Place 1 patch onto the skin every 72 hours for 30 days. May fill 1-2 days early if necessary due to pharmacy hours, Disp-10 patch, R-0Normal  -     Comprehensive Metabolic Panel; Future  -     CBC with Auto Differential; Future  2. Primary osteoarthritis involving multiple joints  Assessment & Plan:   Borderline controlled, continue current medications  Orders:  -     LORazepam (ATIVAN) 1 MG tablet; Take 1 tablet by mouth 3 times daily as needed for Anxiety for up to 30 days. TAKE 1 TABLET BY MOUTH three TIMES DAILY AS NEEDED FOR ANXIETY( MAXIMUM DAILY DOSE IS 4 TABLETS) Indications: anxious may fill 1-2 days early if necessary due to pharmacy hours, Disp-90 tablet, R-2Normal  -     oxyCODONE (ROXICODONE) 5 MG immediate release tablet; Take 2 tablets by mouth 4 times daily as needed for Pain for up to 30 days. May fill one to two days early if necessary due to pharmacy hours, Disp-240 tablet, R-0Normal  -     fentaNYL (DURAGESIC) 75 MCG/HR; Place 1 patch onto the skin every 72 hours for 30 days.  May fill 1-2 days early if necessary due to pharmacy hours, Disp-10 patch, R-0Normal  -

## 2023-06-07 ENCOUNTER — TELEPHONE (OUTPATIENT)
Dept: FAMILY MEDICINE CLINIC | Facility: CLINIC | Age: 73
End: 2023-06-07

## 2023-06-07 DIAGNOSIS — M15.9 PRIMARY OSTEOARTHRITIS INVOLVING MULTIPLE JOINTS: ICD-10-CM

## 2023-06-07 DIAGNOSIS — G89.4 CHRONIC PAIN SYNDROME: ICD-10-CM

## 2023-06-07 DIAGNOSIS — F11.20 CONTINUOUS OPIOID DEPENDENCE (HCC): ICD-10-CM

## 2023-06-07 DIAGNOSIS — M79.7 FIBROMYALGIA: ICD-10-CM

## 2023-06-07 DIAGNOSIS — M54.50 CHRONIC LOW BACK PAIN, UNSPECIFIED BACK PAIN LATERALITY, UNSPECIFIED WHETHER SCIATICA PRESENT: ICD-10-CM

## 2023-06-07 DIAGNOSIS — G89.29 CHRONIC LOW BACK PAIN, UNSPECIFIED BACK PAIN LATERALITY, UNSPECIFIED WHETHER SCIATICA PRESENT: ICD-10-CM

## 2023-06-07 RX ORDER — FENTANYL 75 UG/H
1 PATCH TRANSDERMAL
Qty: 10 PATCH | Refills: 0 | Status: SHIPPED | OUTPATIENT
Start: 2023-06-17 | End: 2023-07-17

## 2023-06-07 RX ORDER — OXYCODONE HYDROCHLORIDE 5 MG/1
10 TABLET ORAL 4 TIMES DAILY PRN
Qty: 240 TABLET | Refills: 0 | Status: SHIPPED | OUTPATIENT
Start: 2023-06-12 | End: 2023-07-12

## 2023-07-10 DIAGNOSIS — F11.20 CONTINUOUS OPIOID DEPENDENCE (HCC): ICD-10-CM

## 2023-07-10 DIAGNOSIS — G89.29 CHRONIC LOW BACK PAIN, UNSPECIFIED BACK PAIN LATERALITY, UNSPECIFIED WHETHER SCIATICA PRESENT: ICD-10-CM

## 2023-07-10 DIAGNOSIS — M79.7 FIBROMYALGIA: ICD-10-CM

## 2023-07-10 DIAGNOSIS — M54.50 CHRONIC LOW BACK PAIN, UNSPECIFIED BACK PAIN LATERALITY, UNSPECIFIED WHETHER SCIATICA PRESENT: ICD-10-CM

## 2023-07-10 DIAGNOSIS — G89.4 CHRONIC PAIN SYNDROME: ICD-10-CM

## 2023-07-10 DIAGNOSIS — M15.9 PRIMARY OSTEOARTHRITIS INVOLVING MULTIPLE JOINTS: ICD-10-CM

## 2023-07-11 DIAGNOSIS — F11.20 CONTINUOUS OPIOID DEPENDENCE (HCC): ICD-10-CM

## 2023-07-11 DIAGNOSIS — G89.4 CHRONIC PAIN SYNDROME: ICD-10-CM

## 2023-07-11 DIAGNOSIS — M15.9 PRIMARY OSTEOARTHRITIS INVOLVING MULTIPLE JOINTS: ICD-10-CM

## 2023-07-11 DIAGNOSIS — M54.50 CHRONIC LOW BACK PAIN, UNSPECIFIED BACK PAIN LATERALITY, UNSPECIFIED WHETHER SCIATICA PRESENT: ICD-10-CM

## 2023-07-11 DIAGNOSIS — M79.7 FIBROMYALGIA: ICD-10-CM

## 2023-07-11 DIAGNOSIS — G89.29 CHRONIC LOW BACK PAIN, UNSPECIFIED BACK PAIN LATERALITY, UNSPECIFIED WHETHER SCIATICA PRESENT: ICD-10-CM

## 2023-07-11 RX ORDER — OXYCODONE HYDROCHLORIDE 5 MG/1
10 TABLET ORAL 4 TIMES DAILY PRN
Qty: 240 TABLET | Refills: 0 | Status: SHIPPED | OUTPATIENT
Start: 2023-07-12 | End: 2023-07-11 | Stop reason: SDUPTHER

## 2023-07-11 RX ORDER — FENTANYL 75 UG/H
1 PATCH TRANSDERMAL
Qty: 10 PATCH | Refills: 0 | Status: SHIPPED | OUTPATIENT
Start: 2023-07-15 | End: 2023-07-11 | Stop reason: SDUPTHER

## 2023-07-11 RX ORDER — OXYCODONE HYDROCHLORIDE 5 MG/1
10 TABLET ORAL 4 TIMES DAILY PRN
Qty: 240 TABLET | Refills: 0 | Status: SHIPPED | OUTPATIENT
Start: 2023-07-12 | End: 2023-08-11

## 2023-07-11 RX ORDER — FENTANYL 75 UG/H
1 PATCH TRANSDERMAL
Qty: 10 PATCH | Refills: 0 | Status: SHIPPED | OUTPATIENT
Start: 2023-07-15 | End: 2023-08-14

## 2023-08-02 DIAGNOSIS — R18.8 CIRRHOSIS OF LIVER WITH ASCITES, UNSPECIFIED HEPATIC CIRRHOSIS TYPE (HCC): ICD-10-CM

## 2023-08-02 DIAGNOSIS — G89.4 CHRONIC PAIN SYNDROME: ICD-10-CM

## 2023-08-02 DIAGNOSIS — M15.9 PRIMARY OSTEOARTHRITIS INVOLVING MULTIPLE JOINTS: ICD-10-CM

## 2023-08-02 DIAGNOSIS — R16.1 SPLENOMEGALY: ICD-10-CM

## 2023-08-02 DIAGNOSIS — K74.60 CIRRHOSIS OF LIVER WITH ASCITES, UNSPECIFIED HEPATIC CIRRHOSIS TYPE (HCC): ICD-10-CM

## 2023-08-02 LAB
ALBUMIN SERPL-MCNC: 4.3 G/DL (ref 3.2–4.6)
ALBUMIN/GLOB SERPL: 1.4 (ref 0.4–1.6)
ALP SERPL-CCNC: 87 U/L (ref 50–136)
ALT SERPL-CCNC: 27 U/L (ref 12–65)
ANION GAP SERPL CALC-SCNC: 5 MMOL/L (ref 2–11)
AST SERPL-CCNC: 24 U/L (ref 15–37)
BASOPHILS # BLD: 0 K/UL (ref 0–0.2)
BASOPHILS NFR BLD: 1 % (ref 0–2)
BILIRUB SERPL-MCNC: 1.2 MG/DL (ref 0.2–1.1)
BUN SERPL-MCNC: 14 MG/DL (ref 8–23)
CALCIUM SERPL-MCNC: 9.1 MG/DL (ref 8.3–10.4)
CHLORIDE SERPL-SCNC: 110 MMOL/L (ref 101–110)
CO2 SERPL-SCNC: 27 MMOL/L (ref 21–32)
CREAT SERPL-MCNC: 0.7 MG/DL (ref 0.6–1)
DIFFERENTIAL METHOD BLD: ABNORMAL
EOSINOPHIL # BLD: 0 K/UL (ref 0–0.8)
EOSINOPHIL NFR BLD: 0 % (ref 0.5–7.8)
ERYTHROCYTE [DISTWIDTH] IN BLOOD BY AUTOMATED COUNT: 12.9 % (ref 11.9–14.6)
GLOBULIN SER CALC-MCNC: 3 G/DL (ref 2.8–4.5)
GLUCOSE SERPL-MCNC: 93 MG/DL (ref 65–100)
HCT VFR BLD AUTO: 45.6 % (ref 35.8–46.3)
HGB BLD-MCNC: 15 G/DL (ref 11.7–15.4)
IMM GRANULOCYTES # BLD AUTO: 0 K/UL (ref 0–0.5)
IMM GRANULOCYTES NFR BLD AUTO: 0 % (ref 0–5)
LYMPHOCYTES # BLD: 1.9 K/UL (ref 0.5–4.6)
LYMPHOCYTES NFR BLD: 26 % (ref 13–44)
MCH RBC QN AUTO: 31.2 PG (ref 26.1–32.9)
MCHC RBC AUTO-ENTMCNC: 32.9 G/DL (ref 31.4–35)
MCV RBC AUTO: 94.8 FL (ref 82–102)
MONOCYTES # BLD: 0.7 K/UL (ref 0.1–1.3)
MONOCYTES NFR BLD: 9 % (ref 4–12)
NEUTS SEG # BLD: 4.7 K/UL (ref 1.7–8.2)
NEUTS SEG NFR BLD: 64 % (ref 43–78)
NRBC # BLD: 0 K/UL (ref 0–0.2)
PLATELET # BLD AUTO: 166 K/UL (ref 150–450)
PMV BLD AUTO: 11.2 FL (ref 9.4–12.3)
POTASSIUM SERPL-SCNC: 3.6 MMOL/L (ref 3.5–5.1)
PROT SERPL-MCNC: 7.3 G/DL (ref 6.3–8.2)
RBC # BLD AUTO: 4.81 M/UL (ref 4.05–5.2)
SODIUM SERPL-SCNC: 142 MMOL/L (ref 133–143)
WBC # BLD AUTO: 7.4 K/UL (ref 4.3–11.1)

## 2023-08-04 ENCOUNTER — OFFICE VISIT (OUTPATIENT)
Dept: FAMILY MEDICINE CLINIC | Facility: CLINIC | Age: 73
End: 2023-08-04
Payer: MEDICARE

## 2023-08-04 VITALS
DIASTOLIC BLOOD PRESSURE: 86 MMHG | WEIGHT: 96 LBS | HEART RATE: 94 BPM | SYSTOLIC BLOOD PRESSURE: 138 MMHG | OXYGEN SATURATION: 94 % | TEMPERATURE: 97 F | RESPIRATION RATE: 18 BRPM | BODY MASS INDEX: 15.99 KG/M2 | HEIGHT: 65 IN

## 2023-08-04 DIAGNOSIS — M54.50 CHRONIC LOW BACK PAIN, UNSPECIFIED BACK PAIN LATERALITY, UNSPECIFIED WHETHER SCIATICA PRESENT: ICD-10-CM

## 2023-08-04 DIAGNOSIS — K74.60 CIRRHOSIS OF LIVER WITH ASCITES, UNSPECIFIED HEPATIC CIRRHOSIS TYPE (HCC): ICD-10-CM

## 2023-08-04 DIAGNOSIS — R18.8 CIRRHOSIS OF LIVER WITH ASCITES, UNSPECIFIED HEPATIC CIRRHOSIS TYPE (HCC): ICD-10-CM

## 2023-08-04 DIAGNOSIS — M79.7 FIBROMYALGIA: ICD-10-CM

## 2023-08-04 DIAGNOSIS — F11.20 CONTINUOUS OPIOID DEPENDENCE (HCC): ICD-10-CM

## 2023-08-04 DIAGNOSIS — K76.6 PORTAL HYPERTENSION (HCC): ICD-10-CM

## 2023-08-04 DIAGNOSIS — G89.29 CHRONIC LOW BACK PAIN, UNSPECIFIED BACK PAIN LATERALITY, UNSPECIFIED WHETHER SCIATICA PRESENT: ICD-10-CM

## 2023-08-04 DIAGNOSIS — M15.9 PRIMARY OSTEOARTHRITIS INVOLVING MULTIPLE JOINTS: ICD-10-CM

## 2023-08-04 DIAGNOSIS — G43.909 MIGRAINE WITHOUT STATUS MIGRAINOSUS, NOT INTRACTABLE, UNSPECIFIED MIGRAINE TYPE: ICD-10-CM

## 2023-08-04 DIAGNOSIS — Z12.11 COLON CANCER SCREENING: ICD-10-CM

## 2023-08-04 DIAGNOSIS — B18.2 CHRONIC HEPATITIS C WITHOUT HEPATIC COMA (HCC): ICD-10-CM

## 2023-08-04 DIAGNOSIS — Z79.899 MEDICATION MANAGEMENT: ICD-10-CM

## 2023-08-04 DIAGNOSIS — E43 SEVERE PROTEIN-CALORIE MALNUTRITION (HCC): ICD-10-CM

## 2023-08-04 DIAGNOSIS — Z12.31 ENCOUNTER FOR SCREENING MAMMOGRAM FOR BREAST CANCER: ICD-10-CM

## 2023-08-04 DIAGNOSIS — F41.1 GAD (GENERALIZED ANXIETY DISORDER): ICD-10-CM

## 2023-08-04 DIAGNOSIS — F17.200 TOBACCO USE DISORDER: ICD-10-CM

## 2023-08-04 DIAGNOSIS — Z78.0 POST-MENOPAUSE: ICD-10-CM

## 2023-08-04 DIAGNOSIS — G89.4 CHRONIC PAIN SYNDROME: Primary | ICD-10-CM

## 2023-08-04 PROCEDURE — G8427 DOCREV CUR MEDS BY ELIG CLIN: HCPCS | Performed by: FAMILY MEDICINE

## 2023-08-04 PROCEDURE — 99214 OFFICE O/P EST MOD 30 MIN: CPT | Performed by: FAMILY MEDICINE

## 2023-08-04 PROCEDURE — 1090F PRES/ABSN URINE INCON ASSESS: CPT | Performed by: FAMILY MEDICINE

## 2023-08-04 PROCEDURE — G8419 CALC BMI OUT NRM PARAM NOF/U: HCPCS | Performed by: FAMILY MEDICINE

## 2023-08-04 PROCEDURE — 3017F COLORECTAL CA SCREEN DOC REV: CPT | Performed by: FAMILY MEDICINE

## 2023-08-04 PROCEDURE — 1123F ACP DISCUSS/DSCN MKR DOCD: CPT | Performed by: FAMILY MEDICINE

## 2023-08-04 PROCEDURE — 4004F PT TOBACCO SCREEN RCVD TLK: CPT | Performed by: FAMILY MEDICINE

## 2023-08-04 PROCEDURE — G8399 PT W/DXA RESULTS DOCUMENT: HCPCS | Performed by: FAMILY MEDICINE

## 2023-08-04 RX ORDER — LORAZEPAM 1 MG/1
1 TABLET ORAL 3 TIMES DAILY PRN
Qty: 90 TABLET | Refills: 2 | Status: SHIPPED | OUTPATIENT
Start: 2023-08-07 | End: 2023-11-05

## 2023-08-04 RX ORDER — LORAZEPAM 1 MG/1
TABLET ORAL
Qty: 30 TABLET | Refills: 2 | Status: SHIPPED | OUTPATIENT
Start: 2023-08-07 | End: 2023-10-21

## 2023-08-04 RX ORDER — OXYCODONE HYDROCHLORIDE 5 MG/1
10 TABLET ORAL 4 TIMES DAILY PRN
Qty: 240 TABLET | Refills: 0 | Status: SHIPPED | OUTPATIENT
Start: 2023-08-11 | End: 2023-09-10

## 2023-08-04 RX ORDER — FENTANYL 75 UG/H
1 PATCH TRANSDERMAL
Qty: 10 PATCH | Refills: 0 | Status: SHIPPED | OUTPATIENT
Start: 2023-08-15 | End: 2023-09-14

## 2023-08-04 ASSESSMENT — ENCOUNTER SYMPTOMS
COUGH: 0
NAUSEA: 0
SHORTNESS OF BREATH: 0
VOMITING: 0
DIARRHEA: 0

## 2023-08-04 ASSESSMENT — PATIENT HEALTH QUESTIONNAIRE - PHQ9: DEPRESSION UNABLE TO ASSESS: PT REFUSES

## 2023-08-04 NOTE — PROGRESS NOTES
Janene Rea (:  1950) is a 68 y.o. female,Established patient, here for evaluation of the following chief complaint(s):  Follow-up (Chronic care follow up. Chronic pain- mx joints and LARISSA- needs meds refilled, checks out in [facility], HAD CMP/CBC on )  CBC is NORMAL, CMP good, Total Bili up a point, only       ASSESSMENT/PLAN:  1. Chronic pain syndrome- stable on pain meds, no sedation/SOB, needs refills soon  -     oxyCODONE (ROXICODONE) 5 MG immediate release tablet; Take 2 tablets by mouth 4 times daily as needed for Pain for up to 30 days. May fill one to two days early if necessary due to pharmacy hours, Disp-240 tablet, R-0Normal  -     fentaNYL (DURAGESIC) 75 MCG/HR; Place 1 patch onto the skin every 72 hours for 30 days. May fill 1-2 days early if necessary due to pharmacy hours, Disp-10 patch, R-0Normal  -     LORazepam (ATIVAN) 1 MG tablet; Take 1 tablet by mouth 3 times daily as needed for Anxiety for up to 90 days. TAKE 1 TABLET BY MOUTH three TIMES DAILY AS NEEDED FOR ANXIETY( MAXIMUM DAILY DOSE IS 4 TABLETS) Indications: anxious may fill 1-2 days early if necessary due to pharmacy hours Max Daily Amount: 3 mg, Disp-90 tablet, R-2Normal  2. Fibromyalgia-chronic / stable on meds  -     oxyCODONE (ROXICODONE) 5 MG immediate release tablet; Take 2 tablets by mouth 4 times daily as needed for Pain for up to 30 days. May fill one to two days early if necessary due to pharmacy hours, Disp-240 tablet, R-0Normal  -     fentaNYL (DURAGESIC) 75 MCG/HR; Place 1 patch onto the skin every 72 hours for 30 days. May fill 1-2 days early if necessary due to pharmacy hours, Disp-10 patch, R-0Normal  -     LORazepam (ATIVAN) 1 MG tablet; Take 1 tablet by mouth 3 times daily as needed for Anxiety for up to 90 days.  TAKE 1 TABLET BY MOUTH three TIMES DAILY AS NEEDED FOR ANXIETY( MAXIMUM DAILY DOSE IS 4 TABLETS) Indications: anxious may fill 1-2 days early if necessary due to pharmacy hours Max Daily Amount: 3

## 2023-08-17 ENCOUNTER — TELEMEDICINE (OUTPATIENT)
Dept: FAMILY MEDICINE CLINIC | Facility: CLINIC | Age: 73
End: 2023-08-17
Payer: MEDICARE

## 2023-08-17 DIAGNOSIS — R20.0 NUMBNESS AND TINGLING OF BOTH FEET: ICD-10-CM

## 2023-08-17 DIAGNOSIS — M79.605 PAIN IN BOTH LOWER EXTREMITIES: ICD-10-CM

## 2023-08-17 DIAGNOSIS — Z23 NEED FOR PROPHYLACTIC VACCINATION AGAINST DIPHTHERIA-TETANUS-PERTUSSIS (DTP): ICD-10-CM

## 2023-08-17 DIAGNOSIS — Z00.00 MEDICARE ANNUAL WELLNESS VISIT, SUBSEQUENT: Primary | ICD-10-CM

## 2023-08-17 DIAGNOSIS — Z23 NEED FOR PROPHYLACTIC VACCINATION AND INOCULATION AGAINST VARICELLA: ICD-10-CM

## 2023-08-17 DIAGNOSIS — M79.604 PAIN IN BOTH LOWER EXTREMITIES: ICD-10-CM

## 2023-08-17 DIAGNOSIS — R20.2 NUMBNESS AND TINGLING OF BOTH FEET: ICD-10-CM

## 2023-08-17 PROCEDURE — G0439 PPPS, SUBSEQ VISIT: HCPCS | Performed by: FAMILY MEDICINE

## 2023-08-17 PROCEDURE — 3017F COLORECTAL CA SCREEN DOC REV: CPT | Performed by: FAMILY MEDICINE

## 2023-08-17 PROCEDURE — 1123F ACP DISCUSS/DSCN MKR DOCD: CPT | Performed by: FAMILY MEDICINE

## 2023-08-17 ASSESSMENT — PATIENT HEALTH QUESTIONNAIRE - PHQ9
SUM OF ALL RESPONSES TO PHQ9 QUESTIONS 1 & 2: 0
5. POOR APPETITE OR OVEREATING: 0
10. IF YOU CHECKED OFF ANY PROBLEMS, HOW DIFFICULT HAVE THESE PROBLEMS MADE IT FOR YOU TO DO YOUR WORK, TAKE CARE OF THINGS AT HOME, OR GET ALONG WITH OTHER PEOPLE: 0
9. THOUGHTS THAT YOU WOULD BE BETTER OFF DEAD, OR OF HURTING YOURSELF: 0
8. MOVING OR SPEAKING SO SLOWLY THAT OTHER PEOPLE COULD HAVE NOTICED. OR THE OPPOSITE, BEING SO FIGETY OR RESTLESS THAT YOU HAVE BEEN MOVING AROUND A LOT MORE THAN USUAL: 0
4. FEELING TIRED OR HAVING LITTLE ENERGY: 0
6. FEELING BAD ABOUT YOURSELF - OR THAT YOU ARE A FAILURE OR HAVE LET YOURSELF OR YOUR FAMILY DOWN: 0
SUM OF ALL RESPONSES TO PHQ QUESTIONS 1-9: 0
1. LITTLE INTEREST OR PLEASURE IN DOING THINGS: 0
SUM OF ALL RESPONSES TO PHQ QUESTIONS 1-9: 0
3. TROUBLE FALLING OR STAYING ASLEEP: 0
2. FEELING DOWN, DEPRESSED OR HOPELESS: 0
SUM OF ALL RESPONSES TO PHQ QUESTIONS 1-9: 0
SUM OF ALL RESPONSES TO PHQ QUESTIONS 1-9: 0
7. TROUBLE CONCENTRATING ON THINGS, SUCH AS READING THE NEWSPAPER OR WATCHING TELEVISION: 0

## 2023-08-17 ASSESSMENT — LIFESTYLE VARIABLES
HOW MANY STANDARD DRINKS CONTAINING ALCOHOL DO YOU HAVE ON A TYPICAL DAY: PATIENT DOES NOT DRINK
HOW OFTEN DO YOU HAVE A DRINK CONTAINING ALCOHOL: NEVER

## 2023-08-17 NOTE — PROGRESS NOTES
Medicare Annual Wellness Visit    Madi Vargas is here for Medicare AWV    Assessment & Plan   Medicare annual wellness visit, subsequent  Need for prophylactic vaccination against diphtheria-tetanus-pertussis (DTP)  -     Tdap (ADACEL) 5-2-15.5 LF-MCG/0.5 injection; Inject 0.5 mLs into the muscle once for 1 dose, Disp-0.5 mL, R-0Print  Need for prophylactic vaccination and inoculation against varicella  -     zoster recombinant adjuvanted vaccine [facility]) 50 MCG/0.5ML SUSR injection; Inject 0.5 mLs into the muscle once for 1 dose, Disp-0.5 mL, R-1Print  Recommendations for Preventive Services Due: see orders and patient instructions/AVS.  Recommended screening schedule for the next 5-10 years is provided to the patient in written form: see Patient Instructions/AVS.     No follow-ups on file. Subjective       Patient's complete Health Risk Assessment and screening values have been reviewed and are found in Flowsheets. The following problems were reviewed today and where indicated follow up appointments were made and/or referrals ordered.     Positive Risk Factor Screenings with Interventions:        Depression:  PHQ-2 Score: 0  PHQ-9 Total Score: 0    Interpretation:   1-4 = minimal  5-9 = mild  10-14 = moderate  15-19 = moderately severe  20-27 = severe          Opioid Risk: (High risk score ?55) Opioid risk score: 90    Last PDMP Adarsh as Reviewed:  Review User Review Instant Review Result   NOVA FERRARA 8/4/2023  2:49 PM @   Reviewed PDMP [1]          General HRA Questions:  Select all that apply: (!) New or Increased Pain, Stress, New or Increased Fatigue    Pain Interventions:  Pt stable on chronic pain med    Fatigue Interventions:  Patient declined any further interventions or treatment    Stress Interventions:  Patient declined any further interventions or treatment       Weight and Activity:  Physical Activity: Insufficiently Active    Days of Exercise per Week: 2 days    Minutes of Exercise per

## 2023-08-18 ENCOUNTER — CLINICAL DOCUMENTATION (OUTPATIENT)
Dept: CASE MANAGEMENT | Age: 73
End: 2023-08-18

## 2023-08-18 NOTE — ACP (ADVANCE CARE PLANNING)
Patient   [] Left Voice mail [] Email / Mail    [] MyChart  [] Other (Specify) : Outcomes:           []  Additional Outreach -  Date:     (Specify Dates & special circumstances): Outcomes:          Thank you for this referral.

## 2023-08-25 ENCOUNTER — CLINICAL DOCUMENTATION (OUTPATIENT)
Dept: CASE MANAGEMENT | Age: 73
End: 2023-08-25

## 2023-09-01 ENCOUNTER — CLINICAL DOCUMENTATION (OUTPATIENT)
Dept: CASE MANAGEMENT | Age: 73
End: 2023-09-01

## 2023-09-05 DIAGNOSIS — F11.20 CONTINUOUS OPIOID DEPENDENCE (HCC): ICD-10-CM

## 2023-09-05 DIAGNOSIS — M79.7 FIBROMYALGIA: ICD-10-CM

## 2023-09-05 DIAGNOSIS — M15.9 PRIMARY OSTEOARTHRITIS INVOLVING MULTIPLE JOINTS: ICD-10-CM

## 2023-09-05 DIAGNOSIS — F41.1 GAD (GENERALIZED ANXIETY DISORDER): ICD-10-CM

## 2023-09-05 DIAGNOSIS — M54.50 CHRONIC LOW BACK PAIN, UNSPECIFIED BACK PAIN LATERALITY, UNSPECIFIED WHETHER SCIATICA PRESENT: ICD-10-CM

## 2023-09-05 DIAGNOSIS — G89.29 CHRONIC LOW BACK PAIN, UNSPECIFIED BACK PAIN LATERALITY, UNSPECIFIED WHETHER SCIATICA PRESENT: ICD-10-CM

## 2023-09-05 DIAGNOSIS — G89.4 CHRONIC PAIN SYNDROME: ICD-10-CM

## 2023-09-05 RX ORDER — FENTANYL 75 UG/H
1 PATCH TRANSDERMAL
Qty: 10 PATCH | Refills: 0 | Status: SHIPPED | OUTPATIENT
Start: 2023-09-11 | End: 2023-10-11

## 2023-09-05 RX ORDER — LORAZEPAM 1 MG/1
TABLET ORAL
Qty: 30 TABLET | Refills: 2 | Status: SHIPPED | OUTPATIENT
Start: 2023-09-08 | End: 2023-11-19

## 2023-09-05 RX ORDER — LORAZEPAM 1 MG/1
1 TABLET ORAL 3 TIMES DAILY PRN
Qty: 90 TABLET | Refills: 2 | Status: SHIPPED | OUTPATIENT
Start: 2023-09-08 | End: 2023-12-07

## 2023-09-05 RX ORDER — OXYCODONE HYDROCHLORIDE 5 MG/1
10 TABLET ORAL 4 TIMES DAILY PRN
Qty: 240 TABLET | Refills: 0 | Status: SHIPPED | OUTPATIENT
Start: 2023-09-10 | End: 2023-09-08

## 2023-09-08 DIAGNOSIS — F11.20 CONTINUOUS OPIOID DEPENDENCE (HCC): ICD-10-CM

## 2023-09-08 DIAGNOSIS — G89.4 CHRONIC PAIN SYNDROME: ICD-10-CM

## 2023-09-08 DIAGNOSIS — M15.9 PRIMARY OSTEOARTHRITIS INVOLVING MULTIPLE JOINTS: ICD-10-CM

## 2023-09-08 DIAGNOSIS — M54.50 CHRONIC LOW BACK PAIN, UNSPECIFIED BACK PAIN LATERALITY, UNSPECIFIED WHETHER SCIATICA PRESENT: ICD-10-CM

## 2023-09-08 DIAGNOSIS — G89.29 CHRONIC LOW BACK PAIN, UNSPECIFIED BACK PAIN LATERALITY, UNSPECIFIED WHETHER SCIATICA PRESENT: ICD-10-CM

## 2023-09-08 DIAGNOSIS — M79.7 FIBROMYALGIA: ICD-10-CM

## 2023-09-08 RX ORDER — OXYCODONE HYDROCHLORIDE 10 MG/1
10 TABLET ORAL 4 TIMES DAILY PRN
Qty: 120 TABLET | Refills: 0 | Status: SHIPPED | OUTPATIENT
Start: 2023-09-10 | End: 2023-10-10

## 2023-09-08 RX ORDER — MORPHINE SULFATE 30 MG/1
30 TABLET, FILM COATED, EXTENDED RELEASE ORAL 2 TIMES DAILY
Qty: 8 TABLET | Refills: 0 | Status: SHIPPED | OUTPATIENT
Start: 2023-09-11 | End: 2023-09-14

## 2023-10-04 DIAGNOSIS — M79.7 FIBROMYALGIA: ICD-10-CM

## 2023-10-04 DIAGNOSIS — M54.50 CHRONIC LOW BACK PAIN, UNSPECIFIED BACK PAIN LATERALITY, UNSPECIFIED WHETHER SCIATICA PRESENT: ICD-10-CM

## 2023-10-04 DIAGNOSIS — G89.29 CHRONIC LOW BACK PAIN, UNSPECIFIED BACK PAIN LATERALITY, UNSPECIFIED WHETHER SCIATICA PRESENT: ICD-10-CM

## 2023-10-04 DIAGNOSIS — M15.9 PRIMARY OSTEOARTHRITIS INVOLVING MULTIPLE JOINTS: ICD-10-CM

## 2023-10-04 DIAGNOSIS — F41.1 GAD (GENERALIZED ANXIETY DISORDER): ICD-10-CM

## 2023-10-04 DIAGNOSIS — F11.20 CONTINUOUS OPIOID DEPENDENCE (HCC): ICD-10-CM

## 2023-10-04 DIAGNOSIS — G89.4 CHRONIC PAIN SYNDROME: ICD-10-CM

## 2023-10-04 RX ORDER — OXYCODONE HYDROCHLORIDE 10 MG/1
10 TABLET ORAL 4 TIMES DAILY PRN
Qty: 120 TABLET | Refills: 0 | Status: SHIPPED | OUTPATIENT
Start: 2023-10-10 | End: 2023-11-09

## 2023-10-04 RX ORDER — LORAZEPAM 1 MG/1
1 TABLET ORAL 3 TIMES DAILY PRN
Qty: 90 TABLET | Refills: 2 | Status: SHIPPED | OUTPATIENT
Start: 2023-10-07 | End: 2024-01-05

## 2023-10-04 RX ORDER — FENTANYL 75 UG/H
1 PATCH TRANSDERMAL
Qty: 10 PATCH | Refills: 0 | Status: SHIPPED | OUTPATIENT
Start: 2023-10-13 | End: 2023-11-12

## 2023-10-04 RX ORDER — LORAZEPAM 1 MG/1
TABLET ORAL
Qty: 30 TABLET | Refills: 2 | Status: SHIPPED | OUTPATIENT
Start: 2023-10-07 | End: 2023-12-15

## 2023-10-27 ENCOUNTER — HOSPITAL ENCOUNTER (OUTPATIENT)
Dept: MAMMOGRAPHY | Age: 73
End: 2023-10-27
Attending: FAMILY MEDICINE
Payer: MEDICARE

## 2023-10-27 DIAGNOSIS — Z78.0 POST-MENOPAUSE: ICD-10-CM

## 2023-10-27 PROCEDURE — 77080 DXA BONE DENSITY AXIAL: CPT

## 2023-11-06 ENCOUNTER — OFFICE VISIT (OUTPATIENT)
Dept: FAMILY MEDICINE CLINIC | Facility: CLINIC | Age: 73
End: 2023-11-06
Payer: MEDICARE

## 2023-11-06 VITALS
HEIGHT: 65 IN | HEART RATE: 99 BPM | TEMPERATURE: 98.8 F | WEIGHT: 94 LBS | BODY MASS INDEX: 15.66 KG/M2 | DIASTOLIC BLOOD PRESSURE: 84 MMHG | RESPIRATION RATE: 18 BRPM | OXYGEN SATURATION: 96 % | SYSTOLIC BLOOD PRESSURE: 130 MMHG

## 2023-11-06 DIAGNOSIS — K74.60 CIRRHOSIS OF LIVER WITH ASCITES, UNSPECIFIED HEPATIC CIRRHOSIS TYPE (HCC): ICD-10-CM

## 2023-11-06 DIAGNOSIS — M54.50 CHRONIC LOW BACK PAIN, UNSPECIFIED BACK PAIN LATERALITY, UNSPECIFIED WHETHER SCIATICA PRESENT: ICD-10-CM

## 2023-11-06 DIAGNOSIS — G89.4 CHRONIC PAIN SYNDROME: ICD-10-CM

## 2023-11-06 DIAGNOSIS — M79.7 FIBROMYALGIA: ICD-10-CM

## 2023-11-06 DIAGNOSIS — F11.20 CONTINUOUS OPIOID DEPENDENCE (HCC): ICD-10-CM

## 2023-11-06 DIAGNOSIS — F41.1 GENERALIZED ANXIETY DISORDER: ICD-10-CM

## 2023-11-06 DIAGNOSIS — G89.29 CHRONIC LOW BACK PAIN, UNSPECIFIED BACK PAIN LATERALITY, UNSPECIFIED WHETHER SCIATICA PRESENT: ICD-10-CM

## 2023-11-06 DIAGNOSIS — M41.9 SEVERE SCOLIOSIS: ICD-10-CM

## 2023-11-06 DIAGNOSIS — K76.6 PORTAL HYPERTENSION (HCC): Primary | ICD-10-CM

## 2023-11-06 DIAGNOSIS — R18.8 CIRRHOSIS OF LIVER WITH ASCITES, UNSPECIFIED HEPATIC CIRRHOSIS TYPE (HCC): ICD-10-CM

## 2023-11-06 DIAGNOSIS — M15.9 PRIMARY OSTEOARTHRITIS INVOLVING MULTIPLE JOINTS: ICD-10-CM

## 2023-11-06 DIAGNOSIS — F17.200 TOBACCO USE DISORDER: ICD-10-CM

## 2023-11-06 LAB
ALBUMIN SERPL-MCNC: 4.5 G/DL (ref 3.2–4.6)
ALBUMIN/GLOB SERPL: 1.3 (ref 0.4–1.6)
ALP SERPL-CCNC: 85 U/L (ref 50–136)
ALT SERPL-CCNC: 25 U/L (ref 12–65)
ANION GAP SERPL CALC-SCNC: 7 MMOL/L (ref 2–11)
AST SERPL-CCNC: 27 U/L (ref 15–37)
BASOPHILS # BLD: 0 K/UL (ref 0–0.2)
BASOPHILS NFR BLD: 1 % (ref 0–2)
BILIRUB SERPL-MCNC: 0.7 MG/DL (ref 0.2–1.1)
BUN SERPL-MCNC: 10 MG/DL (ref 8–23)
CALCIUM SERPL-MCNC: 9.3 MG/DL (ref 8.3–10.4)
CHLORIDE SERPL-SCNC: 106 MMOL/L (ref 101–110)
CO2 SERPL-SCNC: 27 MMOL/L (ref 21–32)
CREAT SERPL-MCNC: 0.7 MG/DL (ref 0.6–1)
DIFFERENTIAL METHOD BLD: ABNORMAL
EOSINOPHIL # BLD: 0 K/UL (ref 0–0.8)
EOSINOPHIL NFR BLD: 0 % (ref 0.5–7.8)
ERYTHROCYTE [DISTWIDTH] IN BLOOD BY AUTOMATED COUNT: 12.7 % (ref 11.9–14.6)
GLOBULIN SER CALC-MCNC: 3.5 G/DL (ref 2.8–4.5)
GLUCOSE SERPL-MCNC: 91 MG/DL (ref 65–100)
HCT VFR BLD AUTO: 45.3 % (ref 35.8–46.3)
HGB BLD-MCNC: 15.1 G/DL (ref 11.7–15.4)
IMM GRANULOCYTES # BLD AUTO: 0 K/UL (ref 0–0.5)
IMM GRANULOCYTES NFR BLD AUTO: 0 % (ref 0–5)
LYMPHOCYTES # BLD: 1.6 K/UL (ref 0.5–4.6)
LYMPHOCYTES NFR BLD: 31 % (ref 13–44)
MCH RBC QN AUTO: 31.2 PG (ref 26.1–32.9)
MCHC RBC AUTO-ENTMCNC: 33.3 G/DL (ref 31.4–35)
MCV RBC AUTO: 93.6 FL (ref 82–102)
MONOCYTES # BLD: 0.3 K/UL (ref 0.1–1.3)
MONOCYTES NFR BLD: 6 % (ref 4–12)
NEUTS SEG # BLD: 3.1 K/UL (ref 1.7–8.2)
NEUTS SEG NFR BLD: 62 % (ref 43–78)
NRBC # BLD: 0 K/UL (ref 0–0.2)
PLATELET # BLD AUTO: 176 K/UL (ref 150–450)
PMV BLD AUTO: 10.6 FL (ref 9.4–12.3)
POTASSIUM SERPL-SCNC: 3.6 MMOL/L (ref 3.5–5.1)
PROT SERPL-MCNC: 8 G/DL (ref 6.3–8.2)
RBC # BLD AUTO: 4.84 M/UL (ref 4.05–5.2)
SODIUM SERPL-SCNC: 140 MMOL/L (ref 133–143)
WBC # BLD AUTO: 5 K/UL (ref 4.3–11.1)

## 2023-11-06 PROCEDURE — G8419 CALC BMI OUT NRM PARAM NOF/U: HCPCS | Performed by: FAMILY MEDICINE

## 2023-11-06 PROCEDURE — G8484 FLU IMMUNIZE NO ADMIN: HCPCS | Performed by: FAMILY MEDICINE

## 2023-11-06 PROCEDURE — 1090F PRES/ABSN URINE INCON ASSESS: CPT | Performed by: FAMILY MEDICINE

## 2023-11-06 PROCEDURE — G8427 DOCREV CUR MEDS BY ELIG CLIN: HCPCS | Performed by: FAMILY MEDICINE

## 2023-11-06 PROCEDURE — 4004F PT TOBACCO SCREEN RCVD TLK: CPT | Performed by: FAMILY MEDICINE

## 2023-11-06 PROCEDURE — 3017F COLORECTAL CA SCREEN DOC REV: CPT | Performed by: FAMILY MEDICINE

## 2023-11-06 PROCEDURE — 1123F ACP DISCUSS/DSCN MKR DOCD: CPT | Performed by: FAMILY MEDICINE

## 2023-11-06 PROCEDURE — G8399 PT W/DXA RESULTS DOCUMENT: HCPCS | Performed by: FAMILY MEDICINE

## 2023-11-06 PROCEDURE — 99214 OFFICE O/P EST MOD 30 MIN: CPT | Performed by: FAMILY MEDICINE

## 2023-11-06 RX ORDER — OXYCODONE HYDROCHLORIDE 10 MG/1
10 TABLET ORAL 4 TIMES DAILY PRN
Qty: 120 TABLET | Refills: 0 | Status: SHIPPED | OUTPATIENT
Start: 2023-11-09 | End: 2023-11-08 | Stop reason: SDUPTHER

## 2023-11-06 RX ORDER — FENTANYL 75 UG/H
1 PATCH TRANSDERMAL
Qty: 10 PATCH | Refills: 0 | Status: SHIPPED | OUTPATIENT
Start: 2023-11-11 | End: 2023-11-08 | Stop reason: SDUPTHER

## 2023-11-06 ASSESSMENT — PATIENT HEALTH QUESTIONNAIRE - PHQ9: DEPRESSION UNABLE TO ASSESS: PT REFUSES

## 2023-11-06 ASSESSMENT — ENCOUNTER SYMPTOMS
DIARRHEA: 0
BACK PAIN: 1
VOMITING: 0
COUGH: 0
NAUSEA: 0
SHORTNESS OF BREATH: 0

## 2023-11-08 ENCOUNTER — TELEPHONE (OUTPATIENT)
Dept: FAMILY MEDICINE CLINIC | Facility: CLINIC | Age: 73
End: 2023-11-08

## 2023-11-08 DIAGNOSIS — M15.9 PRIMARY OSTEOARTHRITIS INVOLVING MULTIPLE JOINTS: ICD-10-CM

## 2023-11-08 DIAGNOSIS — G89.4 CHRONIC PAIN SYNDROME: ICD-10-CM

## 2023-11-08 DIAGNOSIS — G89.29 CHRONIC LOW BACK PAIN, UNSPECIFIED BACK PAIN LATERALITY, UNSPECIFIED WHETHER SCIATICA PRESENT: ICD-10-CM

## 2023-11-08 DIAGNOSIS — M54.50 CHRONIC LOW BACK PAIN, UNSPECIFIED BACK PAIN LATERALITY, UNSPECIFIED WHETHER SCIATICA PRESENT: ICD-10-CM

## 2023-11-08 DIAGNOSIS — M79.7 FIBROMYALGIA: ICD-10-CM

## 2023-11-08 DIAGNOSIS — F11.20 CONTINUOUS OPIOID DEPENDENCE (HCC): ICD-10-CM

## 2023-11-08 RX ORDER — FENTANYL 75 UG/H
1 PATCH TRANSDERMAL
Qty: 10 PATCH | Refills: 0 | Status: SHIPPED | OUTPATIENT
Start: 2023-11-12 | End: 2023-12-12

## 2023-11-08 RX ORDER — OXYCODONE HYDROCHLORIDE 10 MG/1
10 TABLET ORAL 4 TIMES DAILY PRN
Qty: 120 TABLET | Refills: 0 | Status: SHIPPED | OUTPATIENT
Start: 2023-11-09 | End: 2023-12-09

## 2023-11-08 NOTE — TELEPHONE ENCOUNTER
Pt called and stated that the [facility] where you sent her prescriptions over are closing tomorrow for good. They are needing a new prescription written out for all of the prescriptions EXCEPT for the Lorazepam 1 mg tablet      The new [address] [place] [address] Road is located at [facility] and [place] [address]. It has been updated as well on the system. They need to have the prescriptions sent as soon as possible by today because they have to order it to be here by Sunday. This pharmacy is open on Sundays so that should not be a problem.       Thank you

## 2023-12-01 ENCOUNTER — TELEPHONE (OUTPATIENT)
Dept: FAMILY MEDICINE CLINIC | Facility: CLINIC | Age: 73
End: 2023-12-01

## 2023-12-01 DIAGNOSIS — G89.4 CHRONIC PAIN SYNDROME: ICD-10-CM

## 2023-12-01 DIAGNOSIS — G89.29 CHRONIC LOW BACK PAIN, UNSPECIFIED BACK PAIN LATERALITY, UNSPECIFIED WHETHER SCIATICA PRESENT: ICD-10-CM

## 2023-12-01 DIAGNOSIS — M54.50 CHRONIC LOW BACK PAIN, UNSPECIFIED BACK PAIN LATERALITY, UNSPECIFIED WHETHER SCIATICA PRESENT: ICD-10-CM

## 2023-12-01 DIAGNOSIS — M79.7 FIBROMYALGIA: ICD-10-CM

## 2023-12-01 DIAGNOSIS — F11.20 CONTINUOUS OPIOID DEPENDENCE (HCC): ICD-10-CM

## 2023-12-01 DIAGNOSIS — M15.9 PRIMARY OSTEOARTHRITIS INVOLVING MULTIPLE JOINTS: ICD-10-CM

## 2023-12-01 DIAGNOSIS — F41.1 GAD (GENERALIZED ANXIETY DISORDER): ICD-10-CM

## 2023-12-01 RX ORDER — LORAZEPAM 1 MG/1
TABLET ORAL
Qty: 30 TABLET | Refills: 2 | Status: SHIPPED | OUTPATIENT
Start: 2023-12-07 | End: 2024-02-08

## 2023-12-01 RX ORDER — LORAZEPAM 1 MG/1
1 TABLET ORAL 3 TIMES DAILY PRN
Qty: 90 TABLET | Refills: 2 | Status: SHIPPED | OUTPATIENT
Start: 2023-12-07 | End: 2024-03-06

## 2023-12-01 RX ORDER — OXYCODONE HYDROCHLORIDE 10 MG/1
10 TABLET ORAL 4 TIMES DAILY PRN
Qty: 120 TABLET | Refills: 0 | Status: SHIPPED | OUTPATIENT
Start: 2023-12-09 | End: 2024-01-08

## 2023-12-01 RX ORDER — FENTANYL 75 UG/1
1 PATCH TRANSDERMAL
Qty: 10 PATCH | Refills: 0 | Status: SHIPPED | OUTPATIENT
Start: 2023-12-12 | End: 2024-01-11

## 2023-12-01 NOTE — TELEPHONE ENCOUNTER
Pt needs the following refill:    Lorazepam 1 mg tablet  Take 1 tablet by mouth 3 times daily    Lorazepam 1 mg tablet  Take 1 tablet by mouth every evening    Oxycodone HCI 10 mg immedicat release table  Take 1 tablety by mouth 4 times daily as needed for pain    Fentanyl (Duragesic) 75 MCG/hr  Place 1 patch onto the skin every 72 hours for 30 days      Please use the [facility] on [facility], [address]    Thank you

## 2024-01-03 ENCOUNTER — TELEPHONE (OUTPATIENT)
Dept: FAMILY MEDICINE CLINIC | Facility: CLINIC | Age: 74
End: 2024-01-03

## 2024-01-03 DIAGNOSIS — M15.9 PRIMARY OSTEOARTHRITIS INVOLVING MULTIPLE JOINTS: ICD-10-CM

## 2024-01-03 DIAGNOSIS — F11.20 CONTINUOUS OPIOID DEPENDENCE (HCC): ICD-10-CM

## 2024-01-03 DIAGNOSIS — M54.50 CHRONIC LOW BACK PAIN, UNSPECIFIED BACK PAIN LATERALITY, UNSPECIFIED WHETHER SCIATICA PRESENT: ICD-10-CM

## 2024-01-03 DIAGNOSIS — M79.7 FIBROMYALGIA: ICD-10-CM

## 2024-01-03 DIAGNOSIS — G89.29 CHRONIC LOW BACK PAIN, UNSPECIFIED BACK PAIN LATERALITY, UNSPECIFIED WHETHER SCIATICA PRESENT: ICD-10-CM

## 2024-01-03 DIAGNOSIS — G89.4 CHRONIC PAIN SYNDROME: ICD-10-CM

## 2024-01-03 DIAGNOSIS — G43.E09 CHRONIC MIGRAINE WITH AURA WITHOUT STATUS MIGRAINOSUS, NOT INTRACTABLE: Primary | ICD-10-CM

## 2024-01-03 RX ORDER — OXYCODONE HYDROCHLORIDE 10 MG/1
10 TABLET ORAL 4 TIMES DAILY PRN
Qty: 120 TABLET | Refills: 0 | Status: SHIPPED | OUTPATIENT
Start: 2024-01-08 | End: 2024-02-07

## 2024-01-03 RX ORDER — SUMATRIPTAN 50 MG/1
50 TABLET, FILM COATED ORAL
Qty: 9 TABLET | Refills: 11 | Status: SHIPPED | OUTPATIENT
Start: 2024-01-03 | End: 2024-01-03

## 2024-01-03 RX ORDER — FENTANYL 75 UG/1
1 PATCH TRANSDERMAL
Qty: 10 PATCH | Refills: 0 | Status: SHIPPED | OUTPATIENT
Start: 2024-01-11 | End: 2024-02-10

## 2024-01-03 NOTE — TELEPHONE ENCOUNTER
Refill - Oxycodone;Fentanyl;Sumatriptan - Pt is also checking on some forms to be filled out on her pain medications

## 2024-01-04 NOTE — TELEPHONE ENCOUNTER
I don't have any forms,all I have paperwork wise is faxes that have been faxed. I'm at [place] today so I am going to route this to Marie to see if it may be in [facility]

## 2024-02-02 ENCOUNTER — OFFICE VISIT (OUTPATIENT)
Dept: FAMILY MEDICINE CLINIC | Facility: CLINIC | Age: 74
End: 2024-02-02

## 2024-02-02 VITALS
RESPIRATION RATE: 18 BRPM | HEIGHT: 65 IN | OXYGEN SATURATION: 92 % | SYSTOLIC BLOOD PRESSURE: 134 MMHG | DIASTOLIC BLOOD PRESSURE: 84 MMHG | HEART RATE: 95 BPM | TEMPERATURE: 98 F | WEIGHT: 93 LBS | BODY MASS INDEX: 15.49 KG/M2

## 2024-02-02 DIAGNOSIS — E43 SEVERE PROTEIN-CALORIE MALNUTRITION (HCC): ICD-10-CM

## 2024-02-02 DIAGNOSIS — M79.7 FIBROMYALGIA: ICD-10-CM

## 2024-02-02 DIAGNOSIS — F32.1 CURRENT MODERATE EPISODE OF MAJOR DEPRESSIVE DISORDER, UNSPECIFIED WHETHER RECURRENT (HCC): ICD-10-CM

## 2024-02-02 DIAGNOSIS — M54.50 CHRONIC LOW BACK PAIN, UNSPECIFIED BACK PAIN LATERALITY, UNSPECIFIED WHETHER SCIATICA PRESENT: ICD-10-CM

## 2024-02-02 DIAGNOSIS — D58.2 OTHER HEMOGLOBINOPATHIES (HCC): ICD-10-CM

## 2024-02-02 DIAGNOSIS — K76.6 PORTAL HYPERTENSION (HCC): ICD-10-CM

## 2024-02-02 DIAGNOSIS — G89.29 CHRONIC LOW BACK PAIN, UNSPECIFIED BACK PAIN LATERALITY, UNSPECIFIED WHETHER SCIATICA PRESENT: ICD-10-CM

## 2024-02-02 DIAGNOSIS — G43.E09 CHRONIC MIGRAINE WITH AURA WITHOUT STATUS MIGRAINOSUS, NOT INTRACTABLE: ICD-10-CM

## 2024-02-02 DIAGNOSIS — M15.9 PRIMARY OSTEOARTHRITIS INVOLVING MULTIPLE JOINTS: ICD-10-CM

## 2024-02-02 DIAGNOSIS — N39.0 ACUTE UTI: Primary | ICD-10-CM

## 2024-02-02 DIAGNOSIS — F11.20 CONTINUOUS OPIOID DEPENDENCE (HCC): ICD-10-CM

## 2024-02-02 DIAGNOSIS — F41.1 GAD (GENERALIZED ANXIETY DISORDER): ICD-10-CM

## 2024-02-02 DIAGNOSIS — G89.4 CHRONIC PAIN SYNDROME: ICD-10-CM

## 2024-02-02 LAB
11-NOR-9-THC-9-COOH, POC: NEGATIVE
AMPHETAMINE, URINE, POC: NEGATIVE
BACTERIA URINE, POC: ABNORMAL
BENZODIAZEPINES, URINE, POC: ABNORMAL
BENZOYLECGONINE, URINE, POC: NEGATIVE
BILIRUBIN, URINE, POC: NEGATIVE
BLOOD URINE, POC: ABNORMAL
CASTS URINE, POC: ABNORMAL
EPI CELLS URINE, POC: ABNORMAL
GLUCOSE URINE, POC: 100
KETONES, URINE, POC: NEGATIVE
LEUKOCYTE ESTERASE, URINE, POC: ABNORMAL
METHADONE, URINE, POC: NEGATIVE
METHAMPHETAMINE, URINE, POC: NEGATIVE
MORPHINE, URINE, POC: ABNORMAL
NITRITE, URINE, POC: POSITIVE
PH, URINE, POC: 5.5 (ref 4.6–8)
PHENCYCLIDINE, URINE, POC: NEGATIVE
PROTEIN,URINE, POC: ABNORMAL
RBC, URINE, POC: ABNORMAL
SPECIFIC GRAVITY, URINE, POC: 1.01 (ref 1–1.03)
TRICHOMONAS URINE, POC: ABNORMAL
URINALYSIS CLARITY, POC: CLEAR
URINALYSIS COLOR, POC: YELLOW
URINALYSIS COLOR, POC: YELLOW
UROBILINOGEN, POC: NORMAL
WBC, URINE, POC: ABNORMAL
YEAST, URINE, POC: ABNORMAL

## 2024-02-02 RX ORDER — LORAZEPAM 1 MG/1
1 TABLET ORAL 3 TIMES DAILY PRN
Qty: 90 TABLET | Refills: 2 | Status: CANCELLED | OUTPATIENT
Start: 2024-02-02 | End: 2024-05-02

## 2024-02-02 RX ORDER — LORAZEPAM 1 MG/1
TABLET ORAL
Qty: 30 TABLET | Refills: 2 | Status: CANCELLED | OUTPATIENT
Start: 2024-02-02 | End: 2024-04-05

## 2024-02-02 RX ORDER — SULFAMETHOXAZOLE AND TRIMETHOPRIM 800; 160 MG/1; MG/1
1 TABLET ORAL 2 TIMES DAILY
Qty: 10 TABLET | Refills: 0 | Status: SHIPPED | OUTPATIENT
Start: 2024-02-02 | End: 2024-02-07

## 2024-02-02 RX ORDER — OXYCODONE HYDROCHLORIDE 10 MG/1
10 TABLET ORAL 4 TIMES DAILY PRN
Qty: 120 TABLET | Refills: 0 | Status: SHIPPED | OUTPATIENT
Start: 2024-02-08 | End: 2024-03-09

## 2024-02-02 RX ORDER — FENTANYL 75 UG/1
1 PATCH TRANSDERMAL
Qty: 10 PATCH | Refills: 0 | Status: SHIPPED | OUTPATIENT
Start: 2024-02-10 | End: 2024-03-11

## 2024-02-02 RX ORDER — SUMATRIPTAN 50 MG/1
50 TABLET, FILM COATED ORAL
Qty: 9 TABLET | Refills: 11 | Status: SHIPPED | OUTPATIENT
Start: 2024-02-02 | End: 2024-02-02

## 2024-02-02 ASSESSMENT — PATIENT HEALTH QUESTIONNAIRE - PHQ9
SUM OF ALL RESPONSES TO PHQ QUESTIONS 1-9: 6
2. FEELING DOWN, DEPRESSED OR HOPELESS: 2
5. POOR APPETITE OR OVEREATING: 0
1. LITTLE INTEREST OR PLEASURE IN DOING THINGS: 2
3. TROUBLE FALLING OR STAYING ASLEEP: 0
SUM OF ALL RESPONSES TO PHQ QUESTIONS 1-9: 6
9. THOUGHTS THAT YOU WOULD BE BETTER OFF DEAD, OR OF HURTING YOURSELF: 0
SUM OF ALL RESPONSES TO PHQ QUESTIONS 1-9: 6
SUM OF ALL RESPONSES TO PHQ9 QUESTIONS 1 & 2: 4
8. MOVING OR SPEAKING SO SLOWLY THAT OTHER PEOPLE COULD HAVE NOTICED. OR THE OPPOSITE, BEING SO FIGETY OR RESTLESS THAT YOU HAVE BEEN MOVING AROUND A LOT MORE THAN USUAL: 0
6. FEELING BAD ABOUT YOURSELF - OR THAT YOU ARE A FAILURE OR HAVE LET YOURSELF OR YOUR FAMILY DOWN: 0
4. FEELING TIRED OR HAVING LITTLE ENERGY: 1
7. TROUBLE CONCENTRATING ON THINGS, SUCH AS READING THE NEWSPAPER OR WATCHING TELEVISION: 1
SUM OF ALL RESPONSES TO PHQ QUESTIONS 1-9: 6
10. IF YOU CHECKED OFF ANY PROBLEMS, HOW DIFFICULT HAVE THESE PROBLEMS MADE IT FOR YOU TO DO YOUR WORK, TAKE CARE OF THINGS AT HOME, OR GET ALONG WITH OTHER PEOPLE: 1

## 2024-02-02 ASSESSMENT — ENCOUNTER SYMPTOMS
DIARRHEA: 0
COUGH: 0
VOMITING: 0
NAUSEA: 0
BACK PAIN: 1
SHORTNESS OF BREATH: 0

## 2024-02-02 NOTE — PROGRESS NOTES
Kandace Day (:  1950) is a 73 y.o. female,Established patient, here for evaluation of the following chief complaint(s):  Follow-up (Chronic care follow up. ) and Urinary Tract Infection (Dysuria and bladder spasms. )         ASSESSMENT/PLAN:  1. Acute UTI- severe dysuria for 5+days= AZO helps  -     sulfamethoxazole-trimethoprim (BACTRIM DS;SEPTRA DS) 800-160 MG per tablet; Take 1 tablet by mouth 2 times daily for 5 days, Disp-10 tablet, R-0Normal  -     AMB POC URINALYSIS DIP STICK MANUAL W/ MICRO BSSC= +nit, sml leuk, few bact, 3-5wbc/hpf  -     Culture, Urine; Future  2. Portal hypertension (HCC)-stable, FU with GI  3. Chronic pain syndrome  -     AMB POC DRUG SCREEN, URINE= + OXY, Benzo= appropriate for meds as fentanyl not on our UDS cups  -     fentaNYL (DURAGESIC) 75 MCG/HR; Place 1 patch onto the skin every 72 hours for 30 days. May fill 3 days early due to lost a patch (fell off), Disp-10 patch, R-0Normal  -     oxyCODONE HCl (OXY-IR) 10 MG immediate release tablet; Take 1 tablet by mouth 4 times daily as needed for Pain for up to 30 days. May fill one to two days early if necessary due to pharmacy hours Max Daily Amount: 40 mg, Disp-120 tablet, R-0Normal  4. Primary osteoarthritis involving multiple joints-stable  -     fentaNYL (DURAGESIC) 75 MCG/HR; Place 1 patch onto the skin every 72 hours for 30 days. May fill 3 days early due to lost a patch (fell off), Disp-10 patch, R-0Normal  -     oxyCODONE HCl (OXY-IR) 10 MG immediate release tablet; Take 1 tablet by mouth 4 times daily as needed for Pain for up to 30 days. May fill one to two days early if necessary due to pharmacy hours Max Daily Amount: 40 mg, Disp-120 tablet, R-0Normal  5. Fibromyalgia-stable  -     fentaNYL (DURAGESIC) 75 MCG/HR; Place 1 patch onto the skin every 72 hours for 30 days. May fill 3 days early due to lost a patch (fell off), Disp-10 patch, R-0Normal  -     oxyCODONE HCl (OXY-IR) 10 MG immediate release tablet; Take

## 2024-02-04 LAB
BACTERIA SPEC CULT: ABNORMAL
SERVICE CMNT-IMP: ABNORMAL

## 2024-02-05 ENCOUNTER — TELEPHONE (OUTPATIENT)
Dept: FAMILY MEDICINE CLINIC | Facility: CLINIC | Age: 74
End: 2024-02-05

## 2024-02-05 DIAGNOSIS — N30.00 ACUTE CYSTITIS WITHOUT HEMATURIA: Primary | ICD-10-CM

## 2024-02-05 LAB
BACTERIA SPEC CULT: ABNORMAL
SERVICE CMNT-IMP: ABNORMAL

## 2024-02-05 RX ORDER — NITROFURANTOIN 25; 75 MG/1; MG/1
100 CAPSULE ORAL 2 TIMES DAILY
Qty: 14 CAPSULE | Refills: 0 | Status: SHIPPED | OUTPATIENT
Start: 2024-02-05 | End: 2024-02-12

## 2024-02-05 NOTE — TELEPHONE ENCOUNTER
Pt wants to know if she is supposed take the medication you prescribed today along with the antibiotic you gave her last week. Call pt to advise.

## 2024-02-08 ENCOUNTER — TELEPHONE (OUTPATIENT)
Dept: FAMILY MEDICINE CLINIC | Facility: CLINIC | Age: 74
End: 2024-02-08

## 2024-02-08 DIAGNOSIS — R19.7 DIARRHEA, UNSPECIFIED TYPE: ICD-10-CM

## 2024-02-08 DIAGNOSIS — R11.2 NAUSEA AND VOMITING, UNSPECIFIED VOMITING TYPE: Primary | ICD-10-CM

## 2024-02-08 RX ORDER — ONDANSETRON 4 MG/1
4 TABLET, ORALLY DISINTEGRATING ORAL 3 TIMES DAILY PRN
Qty: 30 TABLET | Refills: 0 | Status: SHIPPED | OUTPATIENT
Start: 2024-02-08

## 2024-02-08 RX ORDER — DIPHENOXYLATE HYDROCHLORIDE AND ATROPINE SULFATE 2.5; .025 MG/1; MG/1
1 TABLET ORAL 4 TIMES DAILY PRN
Qty: 30 TABLET | Refills: 0 | Status: SHIPPED | OUTPATIENT
Start: 2024-02-08 | End: 2024-02-18

## 2024-02-20 ENCOUNTER — TELEPHONE (OUTPATIENT)
Dept: FAMILY MEDICINE CLINIC | Facility: CLINIC | Age: 74
End: 2024-02-20

## 2024-02-20 DIAGNOSIS — N30.00 ACUTE CYSTITIS WITHOUT HEMATURIA: ICD-10-CM

## 2024-02-20 RX ORDER — NITROFURANTOIN 25; 75 MG/1; MG/1
100 CAPSULE ORAL 2 TIMES DAILY
Qty: 14 CAPSULE | Refills: 0 | Status: SHIPPED | OUTPATIENT
Start: 2024-02-20 | End: 2024-02-27

## 2024-02-20 NOTE — TELEPHONE ENCOUNTER
Nitrofurantoin mono-mac 100 caps - Pt needs a refill on this she is not all the way better. Pharmacy [facility] [phone] in [place]

## 2024-03-05 ENCOUNTER — TELEPHONE (OUTPATIENT)
Dept: FAMILY MEDICINE CLINIC | Facility: CLINIC | Age: 74
End: 2024-03-05

## 2024-03-05 DIAGNOSIS — M54.50 CHRONIC LOW BACK PAIN, UNSPECIFIED BACK PAIN LATERALITY, UNSPECIFIED WHETHER SCIATICA PRESENT: ICD-10-CM

## 2024-03-05 DIAGNOSIS — F11.20 CONTINUOUS OPIOID DEPENDENCE (HCC): ICD-10-CM

## 2024-03-05 DIAGNOSIS — F41.1 GAD (GENERALIZED ANXIETY DISORDER): ICD-10-CM

## 2024-03-05 DIAGNOSIS — M15.9 PRIMARY OSTEOARTHRITIS INVOLVING MULTIPLE JOINTS: ICD-10-CM

## 2024-03-05 DIAGNOSIS — M79.7 FIBROMYALGIA: ICD-10-CM

## 2024-03-05 DIAGNOSIS — G89.29 CHRONIC LOW BACK PAIN, UNSPECIFIED BACK PAIN LATERALITY, UNSPECIFIED WHETHER SCIATICA PRESENT: ICD-10-CM

## 2024-03-05 DIAGNOSIS — G89.4 CHRONIC PAIN SYNDROME: ICD-10-CM

## 2024-03-05 NOTE — TELEPHONE ENCOUNTER
Patient wanted to notified PCP - She is done with the medication and feeling better.  Also RX refill request Lorazepam /oxycodone /pain patches

## 2024-03-06 DIAGNOSIS — F41.1 GAD (GENERALIZED ANXIETY DISORDER): ICD-10-CM

## 2024-03-06 RX ORDER — LORAZEPAM 1 MG/1
TABLET ORAL
Qty: 30 TABLET | Refills: 2 | Status: SHIPPED | OUTPATIENT
Start: 2024-03-09 | End: 2024-05-08

## 2024-03-06 RX ORDER — OXYCODONE HYDROCHLORIDE 10 MG/1
10 TABLET ORAL 4 TIMES DAILY PRN
Qty: 120 TABLET | Refills: 0 | Status: SHIPPED | OUTPATIENT
Start: 2024-03-09 | End: 2024-04-08

## 2024-03-06 RX ORDER — FENTANYL 75 UG/1
1 PATCH TRANSDERMAL
Qty: 10 PATCH | Refills: 0 | Status: SHIPPED | OUTPATIENT
Start: 2024-03-11 | End: 2024-04-10

## 2024-03-06 RX ORDER — LORAZEPAM 1 MG/1
1 TABLET ORAL 3 TIMES DAILY PRN
Qty: 90 TABLET | Refills: 2 | Status: SHIPPED | OUTPATIENT
Start: 2024-03-13 | End: 2024-06-11

## 2024-03-06 NOTE — TELEPHONE ENCOUNTER
Erxd    - I have reviewed the patient’s controlled substance prescription history, as maintained in the [place] prescription monitoring program, so that the prescription(s) for a  controlled substance can be given.  - Patient is aware of addictive potential of medication.   - Patient is aware of respiratory suppression and is not experiencing any sedation with medication.   - Patient denies sedation or other side effects from medication  - Patient is instructed on compliance with dosing schedule and acknowledges that no early refill will be provided in the event of non-compliance, theft or loss of medication.   - Patient is aware that they may be subject to random drug testing and pill counts.

## 2024-04-03 ENCOUNTER — TELEPHONE (OUTPATIENT)
Dept: FAMILY MEDICINE CLINIC | Facility: CLINIC | Age: 74
End: 2024-04-03

## 2024-04-03 DIAGNOSIS — M54.50 CHRONIC LOW BACK PAIN, UNSPECIFIED BACK PAIN LATERALITY, UNSPECIFIED WHETHER SCIATICA PRESENT: ICD-10-CM

## 2024-04-03 DIAGNOSIS — M79.7 FIBROMYALGIA: ICD-10-CM

## 2024-04-03 DIAGNOSIS — F11.20 CONTINUOUS OPIOID DEPENDENCE (HCC): ICD-10-CM

## 2024-04-03 DIAGNOSIS — G89.29 CHRONIC LOW BACK PAIN, UNSPECIFIED BACK PAIN LATERALITY, UNSPECIFIED WHETHER SCIATICA PRESENT: ICD-10-CM

## 2024-04-03 DIAGNOSIS — G89.4 CHRONIC PAIN SYNDROME: ICD-10-CM

## 2024-04-03 DIAGNOSIS — M15.9 PRIMARY OSTEOARTHRITIS INVOLVING MULTIPLE JOINTS: ICD-10-CM

## 2024-04-03 RX ORDER — OXYCODONE HYDROCHLORIDE 10 MG/1
10 TABLET ORAL 4 TIMES DAILY PRN
Qty: 120 TABLET | Refills: 0 | Status: SHIPPED | OUTPATIENT
Start: 2024-04-08 | End: 2024-05-08

## 2024-04-03 RX ORDER — FENTANYL 75 UG/1
1 PATCH TRANSDERMAL
Qty: 10 PATCH | Refills: 0 | Status: SHIPPED | OUTPATIENT
Start: 2024-04-10 | End: 2024-05-10

## 2024-04-03 RX ORDER — NALOXONE HYDROCHLORIDE 4 MG/.1ML
1 SPRAY NASAL PRN
Qty: 2 EACH | Refills: 5 | Status: SHIPPED | OUTPATIENT
Start: 2024-04-03

## 2024-04-03 NOTE — TELEPHONE ENCOUNTER
Pt needs the following refill:    Fentanyl (mylan patch) 75MCG/HR  Place 1 patch onto the skin every 72 hours for 30 days  **last time, the pharmacy gave her the wrong patch and she is allergic to the adhesvie that was on that brand, please be sure to order the MYLAN)    Oxycodone Hci 10 mg immediate release tablet  Take 1 tablet by mouth 4 times daily    Please use [facility] in [place]    Thank you

## 2024-05-03 ENCOUNTER — TELEPHONE (OUTPATIENT)
Dept: FAMILY MEDICINE CLINIC | Facility: CLINIC | Age: 74
End: 2024-05-03

## 2024-05-03 DIAGNOSIS — G89.29 CHRONIC LOW BACK PAIN, UNSPECIFIED BACK PAIN LATERALITY, UNSPECIFIED WHETHER SCIATICA PRESENT: ICD-10-CM

## 2024-05-03 DIAGNOSIS — M54.50 CHRONIC LOW BACK PAIN, UNSPECIFIED BACK PAIN LATERALITY, UNSPECIFIED WHETHER SCIATICA PRESENT: ICD-10-CM

## 2024-05-03 DIAGNOSIS — F41.1 GAD (GENERALIZED ANXIETY DISORDER): ICD-10-CM

## 2024-05-03 DIAGNOSIS — M79.7 FIBROMYALGIA: ICD-10-CM

## 2024-05-03 DIAGNOSIS — M15.9 PRIMARY OSTEOARTHRITIS INVOLVING MULTIPLE JOINTS: ICD-10-CM

## 2024-05-03 DIAGNOSIS — F11.20 CONTINUOUS OPIOID DEPENDENCE (HCC): ICD-10-CM

## 2024-05-03 DIAGNOSIS — G89.4 CHRONIC PAIN SYNDROME: ICD-10-CM

## 2024-05-03 RX ORDER — LORAZEPAM 1 MG/1
TABLET ORAL
Qty: 30 TABLET | Refills: 2 | Status: SHIPPED | OUTPATIENT
Start: 2024-05-08 | End: 2024-07-02

## 2024-05-03 RX ORDER — LORAZEPAM 1 MG/1
1 TABLET ORAL 3 TIMES DAILY PRN
Qty: 90 TABLET | Refills: 2 | Status: SHIPPED | OUTPATIENT
Start: 2024-05-12 | End: 2024-08-10

## 2024-05-03 RX ORDER — OXYCODONE HYDROCHLORIDE 10 MG/1
10 TABLET ORAL 4 TIMES DAILY PRN
Qty: 120 TABLET | Refills: 0 | Status: SHIPPED | OUTPATIENT
Start: 2024-05-08 | End: 2024-06-07

## 2024-05-03 RX ORDER — FENTANYL 75 UG/1
1 PATCH TRANSDERMAL
Qty: 10 PATCH | Refills: 0 | Status: SHIPPED | OUTPATIENT
Start: 2024-05-10 | End: 2024-06-09

## 2024-05-03 NOTE — TELEPHONE ENCOUNTER
Pt had to reschedule her appointment until next Thursday and she is asking if her Oxycodone and Lorazepam can be called in before Thursday since her pharmacy has to order it. [address] [place].

## 2024-05-09 ENCOUNTER — OFFICE VISIT (OUTPATIENT)
Dept: FAMILY MEDICINE CLINIC | Facility: CLINIC | Age: 74
End: 2024-05-09
Payer: MEDICARE

## 2024-05-09 VITALS
BODY MASS INDEX: 15.33 KG/M2 | HEART RATE: 92 BPM | TEMPERATURE: 98 F | SYSTOLIC BLOOD PRESSURE: 136 MMHG | OXYGEN SATURATION: 97 % | WEIGHT: 92 LBS | RESPIRATION RATE: 18 BRPM | HEIGHT: 65 IN | DIASTOLIC BLOOD PRESSURE: 88 MMHG

## 2024-05-09 DIAGNOSIS — G89.29 CHRONIC BILATERAL LOW BACK PAIN, UNSPECIFIED WHETHER SCIATICA PRESENT: Primary | ICD-10-CM

## 2024-05-09 DIAGNOSIS — M54.50 CHRONIC BILATERAL LOW BACK PAIN, UNSPECIFIED WHETHER SCIATICA PRESENT: Primary | ICD-10-CM

## 2024-05-09 DIAGNOSIS — F33.0 MILD EPISODE OF RECURRENT MAJOR DEPRESSIVE DISORDER (HCC): ICD-10-CM

## 2024-05-09 DIAGNOSIS — F41.1 GAD (GENERALIZED ANXIETY DISORDER): ICD-10-CM

## 2024-05-09 PROCEDURE — 3017F COLORECTAL CA SCREEN DOC REV: CPT | Performed by: FAMILY MEDICINE

## 2024-05-09 PROCEDURE — 99213 OFFICE O/P EST LOW 20 MIN: CPT | Performed by: FAMILY MEDICINE

## 2024-05-09 PROCEDURE — G8419 CALC BMI OUT NRM PARAM NOF/U: HCPCS | Performed by: FAMILY MEDICINE

## 2024-05-09 PROCEDURE — G8399 PT W/DXA RESULTS DOCUMENT: HCPCS | Performed by: FAMILY MEDICINE

## 2024-05-09 PROCEDURE — 1123F ACP DISCUSS/DSCN MKR DOCD: CPT | Performed by: FAMILY MEDICINE

## 2024-05-09 PROCEDURE — G2211 COMPLEX E/M VISIT ADD ON: HCPCS | Performed by: FAMILY MEDICINE

## 2024-05-09 PROCEDURE — G8428 CUR MEDS NOT DOCUMENT: HCPCS | Performed by: FAMILY MEDICINE

## 2024-05-09 PROCEDURE — 4004F PT TOBACCO SCREEN RCVD TLK: CPT | Performed by: FAMILY MEDICINE

## 2024-05-09 PROCEDURE — 1090F PRES/ABSN URINE INCON ASSESS: CPT | Performed by: FAMILY MEDICINE

## 2024-05-09 RX ORDER — FLUOXETINE 10 MG/1
10 CAPSULE ORAL DAILY
Qty: 90 CAPSULE | Refills: 3 | Status: SHIPPED | OUTPATIENT
Start: 2024-05-09

## 2024-05-09 ASSESSMENT — PATIENT HEALTH QUESTIONNAIRE - PHQ9
7. TROUBLE CONCENTRATING ON THINGS, SUCH AS READING THE NEWSPAPER OR WATCHING TELEVISION: NOT AT ALL
SUM OF ALL RESPONSES TO PHQ9 QUESTIONS 1 & 2: 4
8. MOVING OR SPEAKING SO SLOWLY THAT OTHER PEOPLE COULD HAVE NOTICED. OR THE OPPOSITE, BEING SO FIGETY OR RESTLESS THAT YOU HAVE BEEN MOVING AROUND A LOT MORE THAN USUAL: NOT AT ALL
1. LITTLE INTEREST OR PLEASURE IN DOING THINGS: MORE THAN HALF THE DAYS
SUM OF ALL RESPONSES TO PHQ QUESTIONS 1-9: 11
3. TROUBLE FALLING OR STAYING ASLEEP: MORE THAN HALF THE DAYS
SUM OF ALL RESPONSES TO PHQ QUESTIONS 1-9: 11
2. FEELING DOWN, DEPRESSED OR HOPELESS: MORE THAN HALF THE DAYS
SUM OF ALL RESPONSES TO PHQ QUESTIONS 1-9: 11
10. IF YOU CHECKED OFF ANY PROBLEMS, HOW DIFFICULT HAVE THESE PROBLEMS MADE IT FOR YOU TO DO YOUR WORK, TAKE CARE OF THINGS AT HOME, OR GET ALONG WITH OTHER PEOPLE: VERY DIFFICULT
SUM OF ALL RESPONSES TO PHQ QUESTIONS 1-9: 11
6. FEELING BAD ABOUT YOURSELF - OR THAT YOU ARE A FAILURE OR HAVE LET YOURSELF OR YOUR FAMILY DOWN: SEVERAL DAYS
9. THOUGHTS THAT YOU WOULD BE BETTER OFF DEAD, OR OF HURTING YOURSELF: NOT AT ALL
4. FEELING TIRED OR HAVING LITTLE ENERGY: MORE THAN HALF THE DAYS
5. POOR APPETITE OR OVEREATING: MORE THAN HALF THE DAYS

## 2024-05-09 ASSESSMENT — ENCOUNTER SYMPTOMS
VOMITING: 0
DIARRHEA: 0
COUGH: 0
SHORTNESS OF BREATH: 0
NAUSEA: 0

## 2024-05-09 NOTE — PROGRESS NOTES
Spiritual Care visit. Initial Visit, Pre Surgery Consult. Visit and prayer before patient goes to surgery. Visit by Filomena Panchal M.Ed., Th.B. ,Staff  The following record(s)  below were uploaded for Health Maintenance .    LIPID PANEL  2022

## 2024-05-09 NOTE — PROGRESS NOTES
plan: nothing, medications refilled.           Return in about 3 months (around 8/9/2024) for office followup/recheck.       Subjective   Chronic Pain  This is a chronic problem. The current episode started more than 1 year ago. The problem occurs constantly. The problem has been gradually worsening since onset. The pain is present in the lower back and upper back. The pain is severe. The symptoms are aggravated by any movement. Pertinent negatives include no chest pain, diarrhea, fatigue, nausea, shortness of breath or vomiting.   Anxiety  Presents for follow-up visit. Symptoms include decreased concentration, nervous/anxious behavior and restlessness. Patient reports no chest pain, nausea or shortness of breath. Symptoms occur constantly. The severity of symptoms is moderate. The quality of sleep is fair. Nighttime awakenings: occasional.       Review of Systems   Constitutional:  Negative for chills and fatigue.   Respiratory:  Negative for cough and shortness of breath.    Cardiovascular:  Negative for chest pain and leg swelling.   Gastrointestinal:  Negative for diarrhea, nausea and vomiting.   Psychiatric/Behavioral:  Positive for decreased concentration. The patient is nervous/anxious.         Objective   Physical Exam  Vitals and nursing note reviewed.   Constitutional:       General: She is not in acute distress.     Appearance: Normal appearance.   HENT:      Head: Normocephalic and atraumatic.      Nose: Nose normal.      Mouth/Throat:      Pharynx: Oropharynx is clear.   Eyes:      Extraocular Movements: Extraocular movements intact.      Conjunctiva/sclera: Conjunctivae normal.   Cardiovascular:      Rate and Rhythm: Normal rate and regular rhythm.      Pulses: Normal pulses.      Heart sounds: Normal heart sounds.   Pulmonary:      Effort: Pulmonary effort is normal.      Breath sounds: Normal breath sounds.   Musculoskeletal:         General: No swelling or tenderness. Normal range of motion.

## 2024-05-15 ENCOUNTER — TELEPHONE (OUTPATIENT)
Dept: FAMILY MEDICINE CLINIC | Facility: CLINIC | Age: 74
End: 2024-05-15

## 2024-05-15 DIAGNOSIS — M79.7 FIBROMYALGIA: ICD-10-CM

## 2024-05-15 DIAGNOSIS — M54.50 CHRONIC LOW BACK PAIN, UNSPECIFIED BACK PAIN LATERALITY, UNSPECIFIED WHETHER SCIATICA PRESENT: ICD-10-CM

## 2024-05-15 DIAGNOSIS — G89.29 CHRONIC LOW BACK PAIN, UNSPECIFIED BACK PAIN LATERALITY, UNSPECIFIED WHETHER SCIATICA PRESENT: ICD-10-CM

## 2024-05-15 DIAGNOSIS — F11.20 CONTINUOUS OPIOID DEPENDENCE (HCC): ICD-10-CM

## 2024-05-15 DIAGNOSIS — G89.4 CHRONIC PAIN SYNDROME: ICD-10-CM

## 2024-05-15 DIAGNOSIS — M15.9 PRIMARY OSTEOARTHRITIS INVOLVING MULTIPLE JOINTS: ICD-10-CM

## 2024-05-15 RX ORDER — FENTANYL 75 UG/1
1 PATCH TRANSDERMAL
Qty: 10 PATCH | Refills: 0 | Status: SHIPPED | OUTPATIENT
Start: 2024-05-25 | End: 2024-06-24

## 2024-05-15 NOTE — TELEPHONE ENCOUNTER
Pt states when she went to  medication for pain patch on Friday.      They had sold her patches to someone else.       She finally got one box of the pain patches      She still needs you to send over a script for 75 1 box to finish this month.       Duragesic 75MCG         [facility] on file       Thank you!

## 2024-05-24 ENCOUNTER — TELEPHONE (OUTPATIENT)
Dept: FAMILY MEDICINE CLINIC | Facility: CLINIC | Age: 74
End: 2024-05-24

## 2024-05-24 DIAGNOSIS — G89.29 OTHER CHRONIC PAIN: Primary | ICD-10-CM

## 2024-05-24 RX ORDER — FENTANYL 50 UG/1
1 PATCH TRANSDERMAL
Qty: 2 PATCH | Refills: 0 | Status: SHIPPED | OUTPATIENT
Start: 2024-05-24 | End: 2024-06-23

## 2024-05-24 NOTE — TELEPHONE ENCOUNTER
I called Mrs. Day to inform her that you are not able to authorize a refill of Fentanyl--100 mg as she requested previously.  She started crying while I was trying to talk to her.    The patient had called the pharmacy again.  The pharmacy offered her a dose of 50 mg.      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    Mrs. Day would like to be called before we close our office.

## 2024-05-24 NOTE — TELEPHONE ENCOUNTER
Pt called and said Dr. Godinez called her Fentanyl in last week because he is out this week and pt said [facility] ordered her the box of pain patches and when she went to pick it up they had given it to someone else and they are out of 75 MCG and all they have is Fentanyl 100 MCG and she wants to see if you will send over a Rx for that. Pt was upset due to it being a long weekend and she just found out her daughter has breast cancer. Call pt and let her know.

## 2024-05-24 NOTE — TELEPHONE ENCOUNTER
Is there anything that you can do? This patient has called and now is stating that she will be going through withdrawls.       Thank you !

## 2024-05-24 NOTE — TELEPHONE ENCOUNTER
Pt has been calling all day, then she had called [facility] and told them that we gave her permission to change the dose on the patches.       Is there anything you can do?       She is needing the 75 , but [facility] does have the 50       Fentanyl patches.      [facility] on file!     Thank you

## 2024-05-28 ENCOUNTER — TELEPHONE (OUTPATIENT)
Dept: FAMILY MEDICINE CLINIC | Facility: CLINIC | Age: 74
End: 2024-05-28

## 2024-05-28 DIAGNOSIS — G89.29 OTHER CHRONIC PAIN: ICD-10-CM

## 2024-05-28 RX ORDER — FENTANYL 50 UG/1
1 PATCH TRANSDERMAL
Qty: 2 PATCH | Refills: 0 | Status: SHIPPED | OUTPATIENT
Start: 2024-05-28 | End: 2024-06-03

## 2024-05-28 NOTE — TELEPHONE ENCOUNTER
Pt called and stated that her 75 has not came in yet to the pharmacy       So she needs you to call in one 50mg patch , she said Dr. Nathen Morales called her in 2     50 mg patches but she only got one. States the patient.      So she is asking to make sure 2     50mg patches . Fentanyl.      [facility] pharmacy on file.       Thank you !

## 2024-06-04 DIAGNOSIS — M79.7 FIBROMYALGIA: ICD-10-CM

## 2024-06-04 DIAGNOSIS — F41.1 GAD (GENERALIZED ANXIETY DISORDER): ICD-10-CM

## 2024-06-04 DIAGNOSIS — G89.4 CHRONIC PAIN SYNDROME: ICD-10-CM

## 2024-06-04 DIAGNOSIS — G89.29 CHRONIC LOW BACK PAIN, UNSPECIFIED BACK PAIN LATERALITY, UNSPECIFIED WHETHER SCIATICA PRESENT: ICD-10-CM

## 2024-06-04 DIAGNOSIS — F11.20 CONTINUOUS OPIOID DEPENDENCE (HCC): ICD-10-CM

## 2024-06-04 DIAGNOSIS — M15.9 PRIMARY OSTEOARTHRITIS INVOLVING MULTIPLE JOINTS: ICD-10-CM

## 2024-06-04 DIAGNOSIS — M54.50 CHRONIC LOW BACK PAIN, UNSPECIFIED BACK PAIN LATERALITY, UNSPECIFIED WHETHER SCIATICA PRESENT: ICD-10-CM

## 2024-06-04 RX ORDER — LORAZEPAM 1 MG/1
1 TABLET ORAL 3 TIMES DAILY PRN
Qty: 90 TABLET | Refills: 2 | OUTPATIENT
Start: 2024-06-04 | End: 2024-09-02

## 2024-06-04 RX ORDER — OXYCODONE HYDROCHLORIDE 10 MG/1
10 TABLET ORAL 4 TIMES DAILY PRN
Qty: 120 TABLET | Refills: 0 | Status: SHIPPED | OUTPATIENT
Start: 2024-06-07 | End: 2024-07-07

## 2024-06-04 NOTE — TELEPHONE ENCOUNTER
NEEDING:  - Oxycodone HCI--10 mg/has around 14 pills in the bottle/30 days supply/[facility] at [address] [place] [address] Rd/[phone]    - Lorazepam--1 mg/still has around 14 pills in the bottle/30 days supply/same pharmacy (NEEDS RENEWAL)    The patient stated that she would not be able to attend to her 6/6/24 appointment due to being with her daughter who is starting her chemotherapy treatment.    In addition, she would like to inform Dr. Godinez that she is doing much better with her antidepressant medication PROZAC.

## 2024-06-21 ENCOUNTER — TELEPHONE (OUTPATIENT)
Dept: FAMILY MEDICINE CLINIC | Facility: CLINIC | Age: 74
End: 2024-06-21

## 2024-06-21 DIAGNOSIS — M54.50 CHRONIC LOW BACK PAIN, UNSPECIFIED BACK PAIN LATERALITY, UNSPECIFIED WHETHER SCIATICA PRESENT: ICD-10-CM

## 2024-06-21 DIAGNOSIS — M15.9 PRIMARY OSTEOARTHRITIS INVOLVING MULTIPLE JOINTS: ICD-10-CM

## 2024-06-21 DIAGNOSIS — M79.7 FIBROMYALGIA: ICD-10-CM

## 2024-06-21 DIAGNOSIS — F11.20 CONTINUOUS OPIOID DEPENDENCE (HCC): ICD-10-CM

## 2024-06-21 DIAGNOSIS — G89.4 CHRONIC PAIN SYNDROME: ICD-10-CM

## 2024-06-21 DIAGNOSIS — G89.29 CHRONIC LOW BACK PAIN, UNSPECIFIED BACK PAIN LATERALITY, UNSPECIFIED WHETHER SCIATICA PRESENT: ICD-10-CM

## 2024-06-21 RX ORDER — FENTANYL 75 UG/1
1 PATCH TRANSDERMAL
Qty: 10 PATCH | Refills: 0 | Status: SHIPPED | OUTPATIENT
Start: 2024-06-21 | End: 2024-07-21

## 2024-06-21 NOTE — TELEPHONE ENCOUNTER
- I have reviewed the patient’s controlled substance prescription history, as maintained in the [place] prescription monitoring program, so that the prescription(s) for a  controlled substance can be given.  - Patient is aware of addictive potential of medication.   - Patient is aware of respiratory suppression and is not experiencing any sedation with medication.   - Patient denies sedation or other side effects from medication  - Patient is instructed on compliance with dosing schedule and acknowledges that no early refill will be provided in the event of non-compliance, theft or loss of medication.   - Patient is aware that they may be subject to random drug testing and pill counts.

## 2024-06-21 NOTE — TELEPHONE ENCOUNTER
NEEDING:  - Duragesic pain patch--75 mcg/2 boxes/only has one patch/30 days supply/[facility] at [address] [place] [address] Rd, [place]/[phone]    The patient stated that Dr. Godinez told her to mention that the last time it was a \"whole mess\" to get a refill for it.  She supposed to replace her patch on 6/26/24.

## 2024-07-03 ENCOUNTER — TELEPHONE (OUTPATIENT)
Dept: FAMILY MEDICINE CLINIC | Facility: CLINIC | Age: 74
End: 2024-07-03

## 2024-07-03 DIAGNOSIS — M15.9 PRIMARY OSTEOARTHRITIS INVOLVING MULTIPLE JOINTS: ICD-10-CM

## 2024-07-03 DIAGNOSIS — G89.29 CHRONIC LOW BACK PAIN, UNSPECIFIED BACK PAIN LATERALITY, UNSPECIFIED WHETHER SCIATICA PRESENT: ICD-10-CM

## 2024-07-03 DIAGNOSIS — M54.50 CHRONIC LOW BACK PAIN, UNSPECIFIED BACK PAIN LATERALITY, UNSPECIFIED WHETHER SCIATICA PRESENT: ICD-10-CM

## 2024-07-03 DIAGNOSIS — F11.20 CONTINUOUS OPIOID DEPENDENCE (HCC): ICD-10-CM

## 2024-07-03 DIAGNOSIS — F41.1 GAD (GENERALIZED ANXIETY DISORDER): ICD-10-CM

## 2024-07-03 DIAGNOSIS — G89.4 CHRONIC PAIN SYNDROME: ICD-10-CM

## 2024-07-03 DIAGNOSIS — M79.7 FIBROMYALGIA: ICD-10-CM

## 2024-07-03 RX ORDER — LORAZEPAM 1 MG/1
TABLET ORAL
Qty: 30 TABLET | Refills: 2 | Status: SHIPPED | OUTPATIENT
Start: 2024-07-12 | End: 2024-08-27

## 2024-07-03 RX ORDER — FENTANYL 75 UG/1
1 PATCH TRANSDERMAL
Qty: 10 PATCH | Refills: 0 | Status: SHIPPED | OUTPATIENT
Start: 2024-07-26 | End: 2024-08-25

## 2024-07-03 RX ORDER — LORAZEPAM 1 MG/1
1 TABLET ORAL 3 TIMES DAILY PRN
Qty: 90 TABLET | Refills: 2 | Status: SHIPPED | OUTPATIENT
Start: 2024-07-07 | End: 2024-10-05

## 2024-07-03 RX ORDER — OXYCODONE HYDROCHLORIDE 10 MG/1
10 TABLET ORAL 4 TIMES DAILY PRN
Qty: 120 TABLET | Refills: 0 | Status: SHIPPED | OUTPATIENT
Start: 2024-07-07 | End: 2024-08-06

## 2024-07-03 NOTE — TELEPHONE ENCOUNTER
Pt called needing refills on medication on.      Oxycodone HCI 10 immediate release tablet     Lorazepam 1 mg      [facility] pharmacy on file.    Thank you

## 2024-07-23 ENCOUNTER — TELEPHONE (OUTPATIENT)
Dept: FAMILY MEDICINE CLINIC | Facility: CLINIC | Age: 74
End: 2024-07-23

## 2024-07-23 DIAGNOSIS — G89.29 CHRONIC LOW BACK PAIN, UNSPECIFIED BACK PAIN LATERALITY, UNSPECIFIED WHETHER SCIATICA PRESENT: ICD-10-CM

## 2024-07-23 DIAGNOSIS — M15.9 PRIMARY OSTEOARTHRITIS INVOLVING MULTIPLE JOINTS: ICD-10-CM

## 2024-07-23 DIAGNOSIS — M54.50 CHRONIC LOW BACK PAIN, UNSPECIFIED BACK PAIN LATERALITY, UNSPECIFIED WHETHER SCIATICA PRESENT: ICD-10-CM

## 2024-07-23 DIAGNOSIS — M79.7 FIBROMYALGIA: ICD-10-CM

## 2024-07-23 DIAGNOSIS — G89.4 CHRONIC PAIN SYNDROME: ICD-10-CM

## 2024-07-23 DIAGNOSIS — F11.20 CONTINUOUS OPIOID DEPENDENCE (HCC): ICD-10-CM

## 2024-07-23 RX ORDER — OXYCODONE HYDROCHLORIDE 10 MG/1
10 TABLET ORAL 4 TIMES DAILY PRN
Qty: 120 TABLET | Refills: 0 | Status: SHIPPED | OUTPATIENT
Start: 2024-08-06 | End: 2024-09-05

## 2024-07-23 RX ORDER — FENTANYL 75 UG/1
1 PATCH TRANSDERMAL
Qty: 10 PATCH | Refills: 0 | Status: SHIPPED | OUTPATIENT
Start: 2024-07-26 | End: 2024-08-25

## 2024-07-23 NOTE — TELEPHONE ENCOUNTER
Pt is calling needing the     Fentanyl 75MCG/HR     [facility] pharmacy on file       Thank you

## 2024-08-02 ENCOUNTER — OFFICE VISIT (OUTPATIENT)
Dept: FAMILY MEDICINE CLINIC | Facility: CLINIC | Age: 74
End: 2024-08-02

## 2024-08-02 VITALS
BODY MASS INDEX: 16.13 KG/M2 | SYSTOLIC BLOOD PRESSURE: 120 MMHG | OXYGEN SATURATION: 96 % | TEMPERATURE: 98.9 F | HEART RATE: 103 BPM | WEIGHT: 96.8 LBS | HEIGHT: 65 IN | DIASTOLIC BLOOD PRESSURE: 86 MMHG

## 2024-08-02 DIAGNOSIS — F13.20 SEDATIVE, HYPNOTIC OR ANXIOLYTIC DEPENDENCE, UNCOMPLICATED (HCC): ICD-10-CM

## 2024-08-02 DIAGNOSIS — R18.8 CIRRHOSIS OF LIVER WITH ASCITES, UNSPECIFIED HEPATIC CIRRHOSIS TYPE (HCC): ICD-10-CM

## 2024-08-02 DIAGNOSIS — M54.50 CHRONIC LOW BACK PAIN, UNSPECIFIED BACK PAIN LATERALITY, UNSPECIFIED WHETHER SCIATICA PRESENT: ICD-10-CM

## 2024-08-02 DIAGNOSIS — F17.200 TOBACCO USE DISORDER: ICD-10-CM

## 2024-08-02 DIAGNOSIS — F32.1 CURRENT MODERATE EPISODE OF MAJOR DEPRESSIVE DISORDER, UNSPECIFIED WHETHER RECURRENT (HCC): ICD-10-CM

## 2024-08-02 DIAGNOSIS — M79.7 FIBROMYALGIA: ICD-10-CM

## 2024-08-02 DIAGNOSIS — G89.29 CHRONIC LOW BACK PAIN, UNSPECIFIED BACK PAIN LATERALITY, UNSPECIFIED WHETHER SCIATICA PRESENT: ICD-10-CM

## 2024-08-02 DIAGNOSIS — M15.9 PRIMARY OSTEOARTHRITIS INVOLVING MULTIPLE JOINTS: ICD-10-CM

## 2024-08-02 DIAGNOSIS — F41.1 GENERALIZED ANXIETY DISORDER: ICD-10-CM

## 2024-08-02 DIAGNOSIS — K76.6 PORTAL HYPERTENSION (HCC): ICD-10-CM

## 2024-08-02 DIAGNOSIS — K74.60 CIRRHOSIS OF LIVER WITH ASCITES, UNSPECIFIED HEPATIC CIRRHOSIS TYPE (HCC): ICD-10-CM

## 2024-08-02 DIAGNOSIS — F11.20 CONTINUOUS OPIOID DEPENDENCE (HCC): ICD-10-CM

## 2024-08-02 DIAGNOSIS — G89.4 CHRONIC PAIN SYNDROME: Primary | ICD-10-CM

## 2024-08-02 LAB
11-NOR-9-THC-9-COOH, POC: NEGATIVE
AMPHETAMINE, URINE, POC: NEGATIVE
BENZODIAZEPINES, URINE, POC: POSITIVE
BENZOYLECGONINE, URINE, POC: NEGATIVE
METHADONE, URINE, POC: NEGATIVE
METHAMPHETAMINE, URINE, POC: NEGATIVE
MORPHINE, URINE, POC: NEGATIVE
PHENCYCLIDINE, URINE, POC: NEGATIVE
URINALYSIS COLOR, POC: YELLOW

## 2024-08-02 RX ORDER — FENTANYL 75 UG/1
1 PATCH TRANSDERMAL
Qty: 10 PATCH | Refills: 0 | Status: SHIPPED | OUTPATIENT
Start: 2024-08-25 | End: 2024-09-24

## 2024-08-02 RX ORDER — OXYCODONE HYDROCHLORIDE 10 MG/1
10 TABLET ORAL 4 TIMES DAILY PRN
Qty: 120 TABLET | Refills: 0 | Status: SHIPPED | OUTPATIENT
Start: 2024-08-06 | End: 2024-09-05

## 2024-08-02 SDOH — ECONOMIC STABILITY: FOOD INSECURITY: WITHIN THE PAST 12 MONTHS, YOU WORRIED THAT YOUR FOOD WOULD RUN OUT BEFORE YOU GOT MONEY TO BUY MORE.: NEVER TRUE

## 2024-08-02 SDOH — ECONOMIC STABILITY: FOOD INSECURITY: WITHIN THE PAST 12 MONTHS, THE FOOD YOU BOUGHT JUST DIDN'T LAST AND YOU DIDN'T HAVE MONEY TO GET MORE.: NEVER TRUE

## 2024-08-02 SDOH — ECONOMIC STABILITY: INCOME INSECURITY: HOW HARD IS IT FOR YOU TO PAY FOR THE VERY BASICS LIKE FOOD, HOUSING, MEDICAL CARE, AND HEATING?: NOT HARD AT ALL

## 2024-08-02 ASSESSMENT — ENCOUNTER SYMPTOMS
DIARRHEA: 0
COUGH: 0
NAUSEA: 0
BACK PAIN: 1
SHORTNESS OF BREATH: 0
VOMITING: 0

## 2024-08-02 NOTE — PROGRESS NOTES
& Plan:   Monitored by specialist- no acute findings meriting change in the plan  Orders:  -     Comprehensive Metabolic Panel; Future  -     CBC with Auto Differential; Future  7. BMI less than 19,adult- worsening- must eat more  Assessment & Plan:   Uncontrolled, lifestyle modifications recommended  BMI handout  8. Cirrhosis of liver with ascites, unspecified hepatic cirrhosis type (HCC)-stable, check labs  Assessment & Plan:   Monitored by specialist- no acute findings meriting change in the plan  Orders:  -     Comprehensive Metabolic Panel; Future  -     CBC with Auto Differential; Future  9. Tobacco use disorder  Assessment & Plan:   Uncontrolled, lifestyle modifications recommended  Orders:  -     CBC with Auto Differential; Future  -     Cessation handout  10. Current moderate episode of major depressive disorder, unspecified whether recurrent (HCC)  Assessment & Plan:   Borderline controlled, continue current medications  stable  11. Generalized anxiety disorder  Assessment & Plan:   Well-controlled, continue current medications  Stable, on lorazepam    - I have reviewed the patient’s controlled substance prescription history, as maintained in the [place] prescription monitoring program, so that the prescription(s) for a  controlled substance can be given.  - Patient is aware of addictive potential of medication.   - Patient is aware of respiratory suppression and is not experiencing any sedation with medication.   - Patient denies sedation or other side effects from medication  - Patient is instructed on compliance with dosing schedule and acknowledges that no early refill will be provided in the event of non-compliance, theft or loss of medication.   - Patient is aware that they may be subject to random drug testing and pill counts.   12. Sedative, hypnotic or anxiolytic dependence, uncomplicated (HCC)-stable  Assessment & Plan:   Well-controlled, continue current medications      Return in about 3

## 2024-08-16 ENCOUNTER — TELEPHONE (OUTPATIENT)
Dept: FAMILY MEDICINE CLINIC | Facility: CLINIC | Age: 74
End: 2024-08-16

## 2024-09-03 ENCOUNTER — TELEPHONE (OUTPATIENT)
Dept: FAMILY MEDICINE CLINIC | Facility: CLINIC | Age: 74
End: 2024-09-03

## 2024-09-03 DIAGNOSIS — G89.4 CHRONIC PAIN SYNDROME: ICD-10-CM

## 2024-09-03 DIAGNOSIS — G89.29 CHRONIC LOW BACK PAIN, UNSPECIFIED BACK PAIN LATERALITY, UNSPECIFIED WHETHER SCIATICA PRESENT: ICD-10-CM

## 2024-09-03 DIAGNOSIS — M79.7 FIBROMYALGIA: ICD-10-CM

## 2024-09-03 DIAGNOSIS — F11.20 CONTINUOUS OPIOID DEPENDENCE (HCC): ICD-10-CM

## 2024-09-03 DIAGNOSIS — M54.50 CHRONIC LOW BACK PAIN, UNSPECIFIED BACK PAIN LATERALITY, UNSPECIFIED WHETHER SCIATICA PRESENT: ICD-10-CM

## 2024-09-03 DIAGNOSIS — F41.1 GAD (GENERALIZED ANXIETY DISORDER): ICD-10-CM

## 2024-09-03 DIAGNOSIS — M15.9 PRIMARY OSTEOARTHRITIS INVOLVING MULTIPLE JOINTS: ICD-10-CM

## 2024-09-03 RX ORDER — LORAZEPAM 1 MG/1
1 TABLET ORAL 3 TIMES DAILY PRN
Qty: 90 TABLET | Refills: 2 | Status: SHIPPED | OUTPATIENT
Start: 2024-09-05 | End: 2024-12-04

## 2024-09-03 RX ORDER — LORAZEPAM 1 MG/1
TABLET ORAL
Qty: 30 TABLET | Refills: 2 | Status: SHIPPED | OUTPATIENT
Start: 2024-09-16 | End: 2024-10-19

## 2024-09-03 RX ORDER — OXYCODONE HYDROCHLORIDE 10 MG/1
10 TABLET ORAL 4 TIMES DAILY PRN
Qty: 120 TABLET | Refills: 0 | Status: SHIPPED | OUTPATIENT
Start: 2024-09-05 | End: 2024-10-05

## 2024-09-03 RX ORDER — FENTANYL 75 UG/1
1 PATCH TRANSDERMAL
Qty: 10 PATCH | Refills: 0 | Status: SHIPPED | OUTPATIENT
Start: 2024-09-24 | End: 2024-10-24

## 2024-10-05 DIAGNOSIS — F11.20 CONTINUOUS OPIOID DEPENDENCE (HCC): ICD-10-CM

## 2024-10-05 DIAGNOSIS — G89.4 CHRONIC PAIN SYNDROME: ICD-10-CM

## 2024-10-05 DIAGNOSIS — M54.50 CHRONIC LOW BACK PAIN, UNSPECIFIED BACK PAIN LATERALITY, UNSPECIFIED WHETHER SCIATICA PRESENT: ICD-10-CM

## 2024-10-05 DIAGNOSIS — M79.7 FIBROMYALGIA: ICD-10-CM

## 2024-10-05 DIAGNOSIS — M15.0 PRIMARY OSTEOARTHRITIS INVOLVING MULTIPLE JOINTS: ICD-10-CM

## 2024-10-05 DIAGNOSIS — G89.29 CHRONIC LOW BACK PAIN, UNSPECIFIED BACK PAIN LATERALITY, UNSPECIFIED WHETHER SCIATICA PRESENT: ICD-10-CM

## 2024-10-05 RX ORDER — OXYCODONE HYDROCHLORIDE 10 MG/1
10 TABLET ORAL 4 TIMES DAILY PRN
Qty: 120 TABLET | Refills: 0 | Status: SHIPPED | OUTPATIENT
Start: 2024-10-05 | End: 2024-11-04

## 2024-10-05 RX ORDER — FENTANYL 75 UG/1
1 PATCH TRANSDERMAL
Qty: 10 PATCH | Refills: 0 | Status: SHIPPED | OUTPATIENT
Start: 2024-10-24 | End: 2024-11-23

## 2024-11-05 ENCOUNTER — OFFICE VISIT (OUTPATIENT)
Dept: FAMILY MEDICINE CLINIC | Facility: CLINIC | Age: 74
End: 2024-11-05

## 2024-11-05 VITALS
WEIGHT: 90 LBS | OXYGEN SATURATION: 96 % | BODY MASS INDEX: 14.99 KG/M2 | HEIGHT: 65 IN | SYSTOLIC BLOOD PRESSURE: 136 MMHG | RESPIRATION RATE: 18 BRPM | DIASTOLIC BLOOD PRESSURE: 88 MMHG | TEMPERATURE: 98 F | HEART RATE: 89 BPM

## 2024-11-05 DIAGNOSIS — G43.E09 CHRONIC MIGRAINE WITH AURA WITHOUT STATUS MIGRAINOSUS, NOT INTRACTABLE: ICD-10-CM

## 2024-11-05 DIAGNOSIS — I83.893 VARICOSE VEINS OF BOTH LEGS WITH EDEMA: ICD-10-CM

## 2024-11-05 DIAGNOSIS — F33.0 MILD EPISODE OF RECURRENT MAJOR DEPRESSIVE DISORDER (HCC): ICD-10-CM

## 2024-11-05 DIAGNOSIS — M15.0 PRIMARY OSTEOARTHRITIS INVOLVING MULTIPLE JOINTS: ICD-10-CM

## 2024-11-05 DIAGNOSIS — M54.50 CHRONIC LOW BACK PAIN, UNSPECIFIED BACK PAIN LATERALITY, UNSPECIFIED WHETHER SCIATICA PRESENT: ICD-10-CM

## 2024-11-05 DIAGNOSIS — F41.1 GAD (GENERALIZED ANXIETY DISORDER): ICD-10-CM

## 2024-11-05 DIAGNOSIS — M79.7 FIBROMYALGIA: ICD-10-CM

## 2024-11-05 DIAGNOSIS — I87.8 VENOUS STASIS: ICD-10-CM

## 2024-11-05 DIAGNOSIS — K76.6 PORTAL HYPERTENSION (HCC): ICD-10-CM

## 2024-11-05 DIAGNOSIS — F11.20 CONTINUOUS OPIOID DEPENDENCE (HCC): ICD-10-CM

## 2024-11-05 DIAGNOSIS — G89.29 CHRONIC LOW BACK PAIN, UNSPECIFIED BACK PAIN LATERALITY, UNSPECIFIED WHETHER SCIATICA PRESENT: ICD-10-CM

## 2024-11-05 DIAGNOSIS — G89.4 CHRONIC PAIN SYNDROME: Primary | ICD-10-CM

## 2024-11-05 DIAGNOSIS — F41.1 GENERALIZED ANXIETY DISORDER: ICD-10-CM

## 2024-11-05 RX ORDER — FLUOXETINE 10 MG/1
10 CAPSULE ORAL DAILY
Qty: 90 CAPSULE | Refills: 3 | Status: SHIPPED | OUTPATIENT
Start: 2024-11-05

## 2024-11-05 RX ORDER — LORAZEPAM 1 MG/1
TABLET ORAL
Qty: 30 TABLET | Refills: 2 | Status: SHIPPED | OUTPATIENT
Start: 2024-11-06 | End: 2024-12-08

## 2024-11-05 RX ORDER — FENTANYL 75 UG/1
1 PATCH TRANSDERMAL
Qty: 10 PATCH | Refills: 0 | Status: SHIPPED | OUTPATIENT
Start: 2024-11-23 | End: 2024-12-23

## 2024-11-05 RX ORDER — SUMATRIPTAN 50 MG/1
50 TABLET, FILM COATED ORAL
Qty: 9 TABLET | Refills: 11 | Status: SHIPPED | OUTPATIENT
Start: 2024-11-05 | End: 2024-11-05

## 2024-11-05 RX ORDER — OXYCODONE HYDROCHLORIDE 10 MG/1
10 TABLET ORAL 4 TIMES DAILY PRN
Qty: 120 TABLET | Refills: 0 | Status: SHIPPED | OUTPATIENT
Start: 2024-11-06 | End: 2024-12-06

## 2024-11-05 RX ORDER — LORAZEPAM 1 MG/1
1 TABLET ORAL 3 TIMES DAILY PRN
Qty: 90 TABLET | Refills: 2 | Status: SHIPPED | OUTPATIENT
Start: 2024-11-06 | End: 2025-02-04

## 2024-11-05 RX ORDER — FLUOXETINE 10 MG/1
10 CAPSULE ORAL DAILY
Qty: 90 CAPSULE | Refills: 3 | Status: SHIPPED | OUTPATIENT
Start: 2024-11-05 | End: 2024-11-05 | Stop reason: SDUPTHER

## 2024-11-05 ASSESSMENT — PATIENT HEALTH QUESTIONNAIRE - PHQ9
1. LITTLE INTEREST OR PLEASURE IN DOING THINGS: NEARLY EVERY DAY
7. TROUBLE CONCENTRATING ON THINGS, SUCH AS READING THE NEWSPAPER OR WATCHING TELEVISION: NOT AT ALL
SUM OF ALL RESPONSES TO PHQ9 QUESTIONS 1 & 2: 6
SUM OF ALL RESPONSES TO PHQ QUESTIONS 1-9: 13
5. POOR APPETITE OR OVEREATING: MORE THAN HALF THE DAYS
8. MOVING OR SPEAKING SO SLOWLY THAT OTHER PEOPLE COULD HAVE NOTICED. OR THE OPPOSITE, BEING SO FIGETY OR RESTLESS THAT YOU HAVE BEEN MOVING AROUND A LOT MORE THAN USUAL: NOT AT ALL
SUM OF ALL RESPONSES TO PHQ QUESTIONS 1-9: 13
4. FEELING TIRED OR HAVING LITTLE ENERGY: MORE THAN HALF THE DAYS
9. THOUGHTS THAT YOU WOULD BE BETTER OFF DEAD, OR OF HURTING YOURSELF: NOT AT ALL
2. FEELING DOWN, DEPRESSED OR HOPELESS: NEARLY EVERY DAY
6. FEELING BAD ABOUT YOURSELF - OR THAT YOU ARE A FAILURE OR HAVE LET YOURSELF OR YOUR FAMILY DOWN: SEVERAL DAYS
3. TROUBLE FALLING OR STAYING ASLEEP: MORE THAN HALF THE DAYS
SUM OF ALL RESPONSES TO PHQ QUESTIONS 1-9: 13
SUM OF ALL RESPONSES TO PHQ QUESTIONS 1-9: 13
10. IF YOU CHECKED OFF ANY PROBLEMS, HOW DIFFICULT HAVE THESE PROBLEMS MADE IT FOR YOU TO DO YOUR WORK, TAKE CARE OF THINGS AT HOME, OR GET ALONG WITH OTHER PEOPLE: VERY DIFFICULT

## 2024-11-05 ASSESSMENT — ENCOUNTER SYMPTOMS
COUGH: 0
NAUSEA: 0
VOMITING: 0
DIARRHEA: 0
SHORTNESS OF BREATH: 0

## 2024-11-05 NOTE — ASSESSMENT & PLAN NOTE
Chronic, at goal (stable), continue current treatment plan  Refill med  Orders:    SUMAtriptan (IMITREX) 50 MG tablet; Take 1 tablet by mouth once as needed for Migraine TAKE 1 TABLET BY MOUTH 1 TIME AS NEEDED FOR MIGRAINE

## 2024-11-05 NOTE — PROGRESS NOTES
Kandace Day (:  1950) is a 74 y.o. female,Established patient, here for evaluation of the following chief complaint(s):  Follow-up (3 month f/u. ), Chronic Pain (Refill meds), Hypertension (Well-controlled), Anxiety (Stable on meds, refills needed), and Other (Wants referral for swelling in the legs and painful veins. )         Assessment & Plan  Chronic pain syndrome   Chronic, at goal (stable), continue current treatment plan  Refill meds, no diversion/sedation  Orders:  •  fentaNYL (DURAGESIC) 75 MCG/HR; Place 1 patch onto the skin every 72 hours for 30 days. Needs MYLAN  •  LORazepam (ATIVAN) 1 MG tablet; Take 1 tablet by mouth 3 times daily as needed for Anxiety for up to 90 days. TAKE 1 TABLET BY MOUTH three TIMES DAILY AS NEEDED FOR ANXIETY( MAXIMUM DAILY DOSE IS 4 TABLETS) Indications: anxious may fill 1-2 days early if necessary due to pharmacy hours Max Daily Amount: 3 mg  •  oxyCODONE HCl (OXY-IR) 10 MG immediate release tablet; Take 1 tablet by mouth 4 times daily as needed for Pain for up to 30 days. May fill one to two days early if necessary due to pharmacy hours Max Daily Amount: 40 mg    - I have reviewed the patient’s controlled substance prescription history, as maintained in the [place] prescription monitoring program, so that the prescription(s) for a  controlled substance can be given.  - Patient is aware of addictive potential of medication.   - Patient is aware of respiratory suppression and is not experiencing any sedation with medication.   - Patient denies sedation or other side effects from medication  - Patient is instructed on compliance with dosing schedule and acknowledges that no early refill will be provided in the event of non-compliance, theft or loss of medication.   - Patient is aware that they may be subject to random drug testing and pill counts.   This is a chronic problem that is stable.  Per review of available records and patient’s , there are not

## 2024-11-05 NOTE — ASSESSMENT & PLAN NOTE
Chronic, at goal (stable), continue current treatment plan  Stable, refill meds  Orders:    fentaNYL (DURAGESIC) 75 MCG/HR; Place 1 patch onto the skin every 72 hours for 30 days. Needs MYLAN    LORazepam (ATIVAN) 1 MG tablet; Take 1 tablet by mouth 3 times daily as needed for Anxiety for up to 90 days. TAKE 1 TABLET BY MOUTH three TIMES DAILY AS NEEDED FOR ANXIETY( MAXIMUM DAILY DOSE IS 4 TABLETS) Indications: anxious may fill 1-2 days early if necessary due to pharmacy hours Max Daily Amount: 3 mg    oxyCODONE HCl (OXY-IR) 10 MG immediate release tablet; Take 1 tablet by mouth 4 times daily as needed for Pain for up to 30 days. May fill one to two days early if necessary due to pharmacy hours Max Daily Amount: 40 mg

## 2024-11-05 NOTE — ASSESSMENT & PLAN NOTE
Chronic, at goal (stable), continue current treatment plan  Refill meds, no diversion/sedation  Orders:    fentaNYL (DURAGESIC) 75 MCG/HR; Place 1 patch onto the skin every 72 hours for 30 days. Needs MYLAN    LORazepam (ATIVAN) 1 MG tablet; Take 1 tablet by mouth 3 times daily as needed for Anxiety for up to 90 days. TAKE 1 TABLET BY MOUTH three TIMES DAILY AS NEEDED FOR ANXIETY( MAXIMUM DAILY DOSE IS 4 TABLETS) Indications: anxious may fill 1-2 days early if necessary due to pharmacy hours Max Daily Amount: 3 mg    oxyCODONE HCl (OXY-IR) 10 MG immediate release tablet; Take 1 tablet by mouth 4 times daily as needed for Pain for up to 30 days. May fill one to two days early if necessary due to pharmacy hours Max Daily Amount: 40 mg    - I have reviewed the patient’s controlled substance prescription history, as maintained in the [place] prescription monitoring program, so that the prescription(s) for a  controlled substance can be given.  - Patient is aware of addictive potential of medication.   - Patient is aware of respiratory suppression and is not experiencing any sedation with medication.   - Patient denies sedation or other side effects from medication  - Patient is instructed on compliance with dosing schedule and acknowledges that no early refill will be provided in the event of non-compliance, theft or loss of medication.   - Patient is aware that they may be subject to random drug testing and pill counts.   This is a chronic problem that is stable.  Per review of available records and patient’s , there are not sign of overuse, misuse, diversion, or concerning side effects. Today we reviewed: the goals of treatment (improve functionality, quality of life, and pain) alternative treatment options including non-narcotic modalities The following changes were made to the patient’s current treatment plan: nothing, medications refilled.

## 2024-11-05 NOTE — ASSESSMENT & PLAN NOTE
Chronic, at goal (stable), continue current treatment plan  Refill meds  Orders:    fentaNYL (DURAGESIC) 75 MCG/HR; Place 1 patch onto the skin every 72 hours for 30 days. Needs MYLAN    LORazepam (ATIVAN) 1 MG tablet; Take 1 tablet by mouth 3 times daily as needed for Anxiety for up to 90 days. TAKE 1 TABLET BY MOUTH three TIMES DAILY AS NEEDED FOR ANXIETY( MAXIMUM DAILY DOSE IS 4 TABLETS) Indications: anxious may fill 1-2 days early if necessary due to pharmacy hours Max Daily Amount: 3 mg    oxyCODONE HCl (OXY-IR) 10 MG immediate release tablet; Take 1 tablet by mouth 4 times daily as needed for Pain for up to 30 days. May fill one to two days early if necessary due to pharmacy hours Max Daily Amount: 40 mg

## 2024-11-19 DIAGNOSIS — F17.200 TOBACCO USE DISORDER: ICD-10-CM

## 2024-11-19 DIAGNOSIS — K74.60 CIRRHOSIS OF LIVER WITH ASCITES, UNSPECIFIED HEPATIC CIRRHOSIS TYPE (HCC): ICD-10-CM

## 2024-11-19 DIAGNOSIS — K76.6 PORTAL HYPERTENSION (HCC): ICD-10-CM

## 2024-11-19 DIAGNOSIS — R18.8 CIRRHOSIS OF LIVER WITH ASCITES, UNSPECIFIED HEPATIC CIRRHOSIS TYPE (HCC): ICD-10-CM

## 2024-11-19 LAB
ALBUMIN SERPL-MCNC: 4.1 G/DL (ref 3.2–4.6)
ALBUMIN/GLOB SERPL: 1.2 (ref 1–1.9)
ALP SERPL-CCNC: 105 U/L (ref 35–104)
ALT SERPL-CCNC: 21 U/L (ref 8–45)
ANION GAP SERPL CALC-SCNC: 12 MMOL/L (ref 7–16)
AST SERPL-CCNC: 45 U/L (ref 15–37)
BASOPHILS # BLD: 0 K/UL (ref 0–0.2)
BASOPHILS NFR BLD: 1 % (ref 0–2)
BILIRUB SERPL-MCNC: 0.5 MG/DL (ref 0–1.2)
BUN SERPL-MCNC: 7 MG/DL (ref 8–23)
CALCIUM SERPL-MCNC: 9.3 MG/DL (ref 8.8–10.2)
CHLORIDE SERPL-SCNC: 101 MMOL/L (ref 98–107)
CO2 SERPL-SCNC: 27 MMOL/L (ref 20–29)
CREAT SERPL-MCNC: 0.68 MG/DL (ref 0.6–1.1)
DIFFERENTIAL METHOD BLD: ABNORMAL
EOSINOPHIL # BLD: 0 K/UL (ref 0–0.8)
EOSINOPHIL NFR BLD: 0 % (ref 0.5–7.8)
ERYTHROCYTE [DISTWIDTH] IN BLOOD BY AUTOMATED COUNT: 13 % (ref 11.9–14.6)
GLOBULIN SER CALC-MCNC: 3.5 G/DL (ref 2.3–3.5)
GLUCOSE SERPL-MCNC: 109 MG/DL (ref 70–99)
HCT VFR BLD AUTO: 46 % (ref 35.8–46.3)
HGB BLD-MCNC: 14.9 G/DL (ref 11.7–15.4)
IMM GRANULOCYTES # BLD AUTO: 0 K/UL (ref 0–0.5)
IMM GRANULOCYTES NFR BLD AUTO: 0 % (ref 0–5)
LYMPHOCYTES # BLD: 1.4 K/UL (ref 0.5–4.6)
LYMPHOCYTES NFR BLD: 27 % (ref 13–44)
MCH RBC QN AUTO: 31.4 PG (ref 26.1–32.9)
MCHC RBC AUTO-ENTMCNC: 32.4 G/DL (ref 31.4–35)
MCV RBC AUTO: 96.8 FL (ref 82–102)
MONOCYTES # BLD: 0.3 K/UL (ref 0.1–1.3)
MONOCYTES NFR BLD: 6 % (ref 4–12)
NEUTS SEG # BLD: 3.4 K/UL (ref 1.7–8.2)
NEUTS SEG NFR BLD: 66 % (ref 43–78)
NRBC # BLD: 0 K/UL (ref 0–0.2)
PLATELET # BLD AUTO: 151 K/UL (ref 150–450)
PMV BLD AUTO: 11.4 FL (ref 9.4–12.3)
POTASSIUM SERPL-SCNC: 4.6 MMOL/L (ref 3.5–5.1)
PROT SERPL-MCNC: 7.6 G/DL (ref 6.3–8.2)
RBC # BLD AUTO: 4.75 M/UL (ref 4.05–5.2)
SODIUM SERPL-SCNC: 141 MMOL/L (ref 136–145)
WBC # BLD AUTO: 5.2 K/UL (ref 4.3–11.1)

## 2024-12-04 ENCOUNTER — TELEPHONE (OUTPATIENT)
Dept: FAMILY MEDICINE CLINIC | Facility: CLINIC | Age: 74
End: 2024-12-04

## 2024-12-04 DIAGNOSIS — G89.29 CHRONIC LOW BACK PAIN, UNSPECIFIED BACK PAIN LATERALITY, UNSPECIFIED WHETHER SCIATICA PRESENT: ICD-10-CM

## 2024-12-04 DIAGNOSIS — F11.20 CONTINUOUS OPIOID DEPENDENCE (HCC): ICD-10-CM

## 2024-12-04 DIAGNOSIS — M79.7 FIBROMYALGIA: ICD-10-CM

## 2024-12-04 DIAGNOSIS — M54.50 CHRONIC LOW BACK PAIN, UNSPECIFIED BACK PAIN LATERALITY, UNSPECIFIED WHETHER SCIATICA PRESENT: ICD-10-CM

## 2024-12-04 DIAGNOSIS — M15.0 PRIMARY OSTEOARTHRITIS INVOLVING MULTIPLE JOINTS: ICD-10-CM

## 2024-12-04 DIAGNOSIS — G89.4 CHRONIC PAIN SYNDROME: ICD-10-CM

## 2024-12-04 RX ORDER — FENTANYL 75 UG/1
1 PATCH TRANSDERMAL
Qty: 10 PATCH | Refills: 0 | Status: SHIPPED | OUTPATIENT
Start: 2024-12-23 | End: 2025-01-22

## 2024-12-04 RX ORDER — OXYCODONE HYDROCHLORIDE 10 MG/1
10 TABLET ORAL 4 TIMES DAILY PRN
Qty: 120 TABLET | Refills: 0 | Status: SHIPPED | OUTPATIENT
Start: 2024-12-06 | End: 2025-01-05

## 2024-12-04 NOTE — TELEPHONE ENCOUNTER
ADEOLA Called for a refill on the following prescriptions:    Fentanyl (DURAGESIC) 75MCG/HR  QTY: 10 patch  Place 1 patch onto the skin every 72 hours for 30 days    Lorazepam (ATIVAN) 1 MG tablet   QTY: 90  Take 1 tablet by mouth 3 times daily as needed for anxiety    Oxycodone HCI (OXY-IR) IR immediate release tablet  QTY: 120   Take 21 tablet by mouth 4 times daily as needed for pain    Please send to pharmacy on file    Thank you

## 2024-12-04 NOTE — TELEPHONE ENCOUNTER
Tell pt I erxd the Fentanyl and OXY, The Lorazepam had 2 refills on each one when I sent them last time. Ask her to confirm those refills with the Pharmacy on Thursday(tomorrow), before I go out on Vacation

## 2025-01-06 ENCOUNTER — TELEPHONE (OUTPATIENT)
Dept: FAMILY MEDICINE CLINIC | Facility: CLINIC | Age: 75
End: 2025-01-06

## 2025-01-06 DIAGNOSIS — G89.4 CHRONIC PAIN SYNDROME: ICD-10-CM

## 2025-01-06 DIAGNOSIS — G89.29 CHRONIC LOW BACK PAIN, UNSPECIFIED BACK PAIN LATERALITY, UNSPECIFIED WHETHER SCIATICA PRESENT: ICD-10-CM

## 2025-01-06 DIAGNOSIS — F11.20 CONTINUOUS OPIOID DEPENDENCE (HCC): ICD-10-CM

## 2025-01-06 DIAGNOSIS — M54.50 CHRONIC LOW BACK PAIN, UNSPECIFIED BACK PAIN LATERALITY, UNSPECIFIED WHETHER SCIATICA PRESENT: ICD-10-CM

## 2025-01-06 DIAGNOSIS — M15.0 PRIMARY OSTEOARTHRITIS INVOLVING MULTIPLE JOINTS: ICD-10-CM

## 2025-01-06 DIAGNOSIS — M79.7 FIBROMYALGIA: ICD-10-CM

## 2025-01-06 RX ORDER — FENTANYL 75 UG/1
1 PATCH TRANSDERMAL
Qty: 10 PATCH | Refills: 0 | Status: SHIPPED | OUTPATIENT
Start: 2025-01-22 | End: 2025-02-21

## 2025-01-06 RX ORDER — OXYCODONE HYDROCHLORIDE 10 MG/1
10 TABLET ORAL 4 TIMES DAILY PRN
Qty: 120 TABLET | Refills: 0 | Status: SHIPPED | OUTPATIENT
Start: 2025-01-06 | End: 2025-02-05

## 2025-01-06 NOTE — TELEPHONE ENCOUNTER
Pt needs the following refill:    Fentanyl (Durage SIC) 75 mcg/hr  Place 1 patch onto the skin every 72 hours    Ocycodone Hci (Oxy IR) 10 mg  Take 1 tablet by mouth 4x daily    Please use the [facility] pharmacy on file.    Thank you

## 2025-02-04 ENCOUNTER — OFFICE VISIT (OUTPATIENT)
Dept: FAMILY MEDICINE CLINIC | Facility: CLINIC | Age: 75
End: 2025-02-04

## 2025-02-04 VITALS
OXYGEN SATURATION: 95 % | SYSTOLIC BLOOD PRESSURE: 136 MMHG | TEMPERATURE: 97.5 F | HEIGHT: 65 IN | DIASTOLIC BLOOD PRESSURE: 88 MMHG | BODY MASS INDEX: 14.49 KG/M2 | HEART RATE: 89 BPM | WEIGHT: 87 LBS | RESPIRATION RATE: 18 BRPM

## 2025-02-04 DIAGNOSIS — Z87.891 PERSONAL HISTORY OF TOBACCO USE: ICD-10-CM

## 2025-02-04 DIAGNOSIS — G89.4 CHRONIC PAIN SYNDROME: ICD-10-CM

## 2025-02-04 DIAGNOSIS — Z23 NEED FOR PROPHYLACTIC VACCINATION AND INOCULATION AGAINST VARICELLA: ICD-10-CM

## 2025-02-04 DIAGNOSIS — F32.1 CURRENT MODERATE EPISODE OF MAJOR DEPRESSIVE DISORDER, UNSPECIFIED WHETHER RECURRENT (HCC): ICD-10-CM

## 2025-02-04 DIAGNOSIS — Z23 NEED FOR PROPHYLACTIC VACCINATION AGAINST DIPHTHERIA-TETANUS-PERTUSSIS (DTP): ICD-10-CM

## 2025-02-04 DIAGNOSIS — Z13.6 SCREENING FOR CARDIOVASCULAR CONDITION: ICD-10-CM

## 2025-02-04 DIAGNOSIS — G43.E09 CHRONIC MIGRAINE WITH AURA WITHOUT STATUS MIGRAINOSUS, NOT INTRACTABLE: ICD-10-CM

## 2025-02-04 DIAGNOSIS — Z23 NEED FOR PROPHYLACTIC VACCINATION AGAINST STREPTOCOCCUS PNEUMONIAE (PNEUMOCOCCUS): ICD-10-CM

## 2025-02-04 DIAGNOSIS — Z00.00 MEDICARE ANNUAL WELLNESS VISIT, SUBSEQUENT: Primary | ICD-10-CM

## 2025-02-04 DIAGNOSIS — M15.0 PRIMARY OSTEOARTHRITIS INVOLVING MULTIPLE JOINTS: ICD-10-CM

## 2025-02-04 DIAGNOSIS — M41.9 SEVERE SCOLIOSIS: ICD-10-CM

## 2025-02-04 DIAGNOSIS — Z29.11 NEED FOR RSV IMMUNIZATION: ICD-10-CM

## 2025-02-04 DIAGNOSIS — F11.20 CONTINUOUS OPIOID DEPENDENCE (HCC): ICD-10-CM

## 2025-02-04 DIAGNOSIS — M54.50 CHRONIC LOW BACK PAIN, UNSPECIFIED BACK PAIN LATERALITY, UNSPECIFIED WHETHER SCIATICA PRESENT: ICD-10-CM

## 2025-02-04 DIAGNOSIS — Z12.31 ENCOUNTER FOR SCREENING MAMMOGRAM FOR BREAST CANCER: ICD-10-CM

## 2025-02-04 DIAGNOSIS — F41.1 GAD (GENERALIZED ANXIETY DISORDER): ICD-10-CM

## 2025-02-04 DIAGNOSIS — K76.6 PORTAL HYPERTENSION (HCC): ICD-10-CM

## 2025-02-04 DIAGNOSIS — M79.7 FIBROMYALGIA: ICD-10-CM

## 2025-02-04 DIAGNOSIS — G89.29 CHRONIC LOW BACK PAIN, UNSPECIFIED BACK PAIN LATERALITY, UNSPECIFIED WHETHER SCIATICA PRESENT: ICD-10-CM

## 2025-02-04 DIAGNOSIS — H02.9 LESION OF RIGHT UPPER EYELID: ICD-10-CM

## 2025-02-04 RX ORDER — SUMATRIPTAN 50 MG/1
50 TABLET, FILM COATED ORAL
Qty: 9 TABLET | Refills: 11 | Status: SHIPPED | OUTPATIENT
Start: 2025-02-04 | End: 2025-02-04

## 2025-02-04 RX ORDER — ERYTHROMYCIN 5 MG/G
OINTMENT OPHTHALMIC
Qty: 3.5 G | Refills: 1 | Status: SHIPPED | OUTPATIENT
Start: 2025-02-04 | End: 2025-02-14

## 2025-02-04 RX ORDER — RESPIRATORY SYNCYTIAL VISUS VACCINE RECOMBINANT, ADJUVANTED 120MCG/0.5
0.5 KIT INTRAMUSCULAR ONCE
Qty: 0.5 ML | Refills: 0 | Status: SHIPPED | OUTPATIENT
Start: 2025-02-04 | End: 2025-02-04

## 2025-02-04 RX ORDER — OXYCODONE HYDROCHLORIDE 10 MG/1
10 TABLET ORAL 4 TIMES DAILY PRN
Qty: 120 TABLET | Refills: 0 | Status: SHIPPED | OUTPATIENT
Start: 2025-02-04 | End: 2025-03-06

## 2025-02-04 RX ORDER — LORAZEPAM 1 MG/1
1 TABLET ORAL 3 TIMES DAILY PRN
Qty: 90 TABLET | Refills: 2 | Status: SHIPPED | OUTPATIENT
Start: 2025-02-05 | End: 2025-05-06

## 2025-02-04 RX ORDER — LORAZEPAM 1 MG/1
TABLET ORAL
Qty: 30 TABLET | Refills: 2 | Status: SHIPPED | OUTPATIENT
Start: 2025-02-05 | End: 2025-03-08

## 2025-02-04 RX ORDER — FENTANYL 75 UG/1
1 PATCH TRANSDERMAL
Qty: 10 PATCH | Refills: 0 | Status: SHIPPED | OUTPATIENT
Start: 2025-02-21 | End: 2025-03-23

## 2025-02-04 SDOH — ECONOMIC STABILITY: FOOD INSECURITY: WITHIN THE PAST 12 MONTHS, YOU WORRIED THAT YOUR FOOD WOULD RUN OUT BEFORE YOU GOT MONEY TO BUY MORE.: NEVER TRUE

## 2025-02-04 SDOH — ECONOMIC STABILITY: FOOD INSECURITY: WITHIN THE PAST 12 MONTHS, THE FOOD YOU BOUGHT JUST DIDN'T LAST AND YOU DIDN'T HAVE MONEY TO GET MORE.: NEVER TRUE

## 2025-02-04 ASSESSMENT — PATIENT HEALTH QUESTIONNAIRE - PHQ9
SUM OF ALL RESPONSES TO PHQ QUESTIONS 1-9: 5
SUM OF ALL RESPONSES TO PHQ9 QUESTIONS 1 & 2: 2
6. FEELING BAD ABOUT YOURSELF - OR THAT YOU ARE A FAILURE OR HAVE LET YOURSELF OR YOUR FAMILY DOWN: NOT AT ALL
9. THOUGHTS THAT YOU WOULD BE BETTER OFF DEAD, OR OF HURTING YOURSELF: NOT AT ALL
SUM OF ALL RESPONSES TO PHQ QUESTIONS 1-9: 5
10. IF YOU CHECKED OFF ANY PROBLEMS, HOW DIFFICULT HAVE THESE PROBLEMS MADE IT FOR YOU TO DO YOUR WORK, TAKE CARE OF THINGS AT HOME, OR GET ALONG WITH OTHER PEOPLE: SOMEWHAT DIFFICULT
5. POOR APPETITE OR OVEREATING: NOT AT ALL
2. FEELING DOWN, DEPRESSED OR HOPELESS: SEVERAL DAYS
8. MOVING OR SPEAKING SO SLOWLY THAT OTHER PEOPLE COULD HAVE NOTICED. OR THE OPPOSITE, BEING SO FIGETY OR RESTLESS THAT YOU HAVE BEEN MOVING AROUND A LOT MORE THAN USUAL: NOT AT ALL
4. FEELING TIRED OR HAVING LITTLE ENERGY: MORE THAN HALF THE DAYS
SUM OF ALL RESPONSES TO PHQ QUESTIONS 1-9: 5
SUM OF ALL RESPONSES TO PHQ QUESTIONS 1-9: 5
3. TROUBLE FALLING OR STAYING ASLEEP: SEVERAL DAYS
7. TROUBLE CONCENTRATING ON THINGS, SUCH AS READING THE NEWSPAPER OR WATCHING TELEVISION: NOT AT ALL
1. LITTLE INTEREST OR PLEASURE IN DOING THINGS: SEVERAL DAYS

## 2025-02-04 ASSESSMENT — ENCOUNTER SYMPTOMS
VOMITING: 0
COUGH: 0
SHORTNESS OF BREATH: 0
NAUSEA: 0
DIARRHEA: 0

## 2025-02-04 ASSESSMENT — LIFESTYLE VARIABLES
HOW OFTEN DO YOU HAVE A DRINK CONTAINING ALCOHOL: NEVER
HOW MANY STANDARD DRINKS CONTAINING ALCOHOL DO YOU HAVE ON A TYPICAL DAY: PATIENT DOES NOT DRINK

## 2025-02-04 NOTE — ASSESSMENT & PLAN NOTE
Chronic, at goal (stable), continue current treatment plan    Orders:    AMB POC DRUG SCREEN, URINE= Appropriate for meds    LORazepam (ATIVAN) 1 MG tablet; Take 1 tablet by mouth 3 times daily as needed for Anxiety for up to 90 days. TAKE 1 TABLET BY MOUTH three TIMES DAILY AS NEEDED FOR ANXIETY( MAXIMUM DAILY DOSE IS 4 TABLETS) Indications: anxious may fill 1-2 days early if necessary due to pharmacy hours Max Daily Amount: 3 mg    fentaNYL (DURAGESIC) 75 MCG/HR; Place 1 patch onto the skin every 72 hours for 30 days. Needs MYLAN    oxyCODONE HCl (OXY-IR) 10 MG immediate release tablet; Take 1 tablet by mouth 4 times daily as needed for Pain for up to 30 days. May fill one to two days early if necessary due to pharmacy hours Max Daily Amount: 40 mg    ESTABLISHED, MOD MDM, 30-39 MIN [[id number]]    - I have reviewed the patient’s controlled substance prescription history, as maintained in the [place] prescription monitoring program, so that the prescription(s) for a  controlled substance can be given.  - Patient is aware of addictive potential of medication.   - Patient is aware of respiratory suppression and is not experiencing any sedation with medication.   - Patient denies sedation or other side effects from medication  - Patient is instructed on compliance with dosing schedule and acknowledges that no early refill will be provided in the event of non-compliance, theft or loss of medication.   - Patient is aware that they may be subject to random drug testing and pill counts.   This is a chronic problem that is stable.  Per review of available records and patient’s , there are not sign of overuse, misuse, diversion, or concerning side effects. Today we reviewed: the goals of treatment (improve functionality, quality of life, and pain) alternative treatment options including non-narcotic modalities The following changes were made to the patient’s current treatment plan: nothing, medications refilled.

## 2025-02-04 NOTE — ASSESSMENT & PLAN NOTE
Chronic, at goal (stable), continue current treatment plan  Refill pain meds / Check UDS  Orders:    LORazepam (ATIVAN) 1 MG tablet; Take 1 tablet by mouth 3 times daily as needed for Anxiety for up to 90 days. TAKE 1 TABLET BY MOUTH three TIMES DAILY AS NEEDED FOR ANXIETY( MAXIMUM DAILY DOSE IS 4 TABLETS) Indications: anxious may fill 1-2 days early if necessary due to pharmacy hours Max Daily Amount: 3 mg    fentaNYL (DURAGESIC) 75 MCG/HR; Place 1 patch onto the skin every 72 hours for 30 days. Needs MYLAN    oxyCODONE HCl (OXY-IR) 10 MG immediate release tablet; Take 1 tablet by mouth 4 times daily as needed for Pain for up to 30 days. May fill one to two days early if necessary due to pharmacy hours Max Daily Amount: 40 mg    ESTABLISHED, MOD MDM, 30-39 MIN [[id number]]    Comprehensive Metabolic Panel; Future    CBC with Auto Differential; Future

## 2025-02-04 NOTE — PATIENT INSTRUCTIONS
health, and be sure to contact your doctor if you have any problems.  Where can you learn more?  Go to [email or web address] and enter F075 to learn more about \"A Healthy Heart: Care Instructions.\"  Current as of: July 31, 2024  Content Version: 14.3  © 2024 [employer].   Care instructions adapted under license by [employer]. If you have questions about a medical condition or this instruction, always ask your healthcare professional. [employer] disclaims any warranty or liability for your use of this information.    Personalized Preventive Plan for Kandace Day - 2/4/2025  Medicare offers a range of preventive health benefits. Some of the tests and screenings are paid in full while other may be subject to a deductible, co-insurance, and/or copay.  Some of these benefits include a comprehensive review of your medical history including lifestyle, illnesses that may run in your family, and various assessments and screenings as appropriate.  After reviewing your medical record and screening and assessments performed today your provider may have ordered immunizations, labs, imaging, and/or referrals for you.  A list of these orders (if applicable) as well as your Preventive Care list are included within your After Visit Summary for your review.

## 2025-02-04 NOTE — ASSESSMENT & PLAN NOTE
Chronic, at goal (stable), continue current treatment plan    Orders:    LORazepam (ATIVAN) 1 MG tablet; Take 1 tablet by mouth 3 times daily as needed for Anxiety for up to 90 days. TAKE 1 TABLET BY MOUTH three TIMES DAILY AS NEEDED FOR ANXIETY( MAXIMUM DAILY DOSE IS 4 TABLETS) Indications: anxious may fill 1-2 days early if necessary due to pharmacy hours Max Daily Amount: 3 mg    fentaNYL (DURAGESIC) 75 MCG/HR; Place 1 patch onto the skin every 72 hours for 30 days. Needs MYLAN    oxyCODONE HCl (OXY-IR) 10 MG immediate release tablet; Take 1 tablet by mouth 4 times daily as needed for Pain for up to 30 days. May fill one to two days early if necessary due to pharmacy hours Max Daily Amount: 40 mg    ESTABLISHED, MOD MDM, 30-39 MIN [[id number]]    Comprehensive Metabolic Panel; Future    CBC with Auto Differential; Future

## 2025-02-04 NOTE — ASSESSMENT & PLAN NOTE
PHQ-9= 5, somewhat improved, still under a lot of stress    Orders:    ESTABLISHED, MOD MDM, 30-39 MIN [[id number]]    Comprehensive Metabolic Panel; Future    CBC with Auto Differential; Future

## 2025-02-04 NOTE — ASSESSMENT & PLAN NOTE
Monitored by specialist- no acute findings meriting change in the plan  Check CMP  Orders:    ESTABLISHED, MOD MDM, 30-39 MIN [[id number]]    Comprehensive Metabolic Panel; Future

## 2025-02-04 NOTE — ASSESSMENT & PLAN NOTE
Chronic, at goal (stable), continue current treatment plan    Orders:    LORazepam (ATIVAN) 1 MG tablet; Take 1 tablet by mouth 3 times daily as needed for Anxiety for up to 90 days. TAKE 1 TABLET BY MOUTH three TIMES DAILY AS NEEDED FOR ANXIETY( MAXIMUM DAILY DOSE IS 4 TABLETS) Indications: anxious may fill 1-2 days early if necessary due to pharmacy hours Max Daily Amount: 3 mg    fentaNYL (DURAGESIC) 75 MCG/HR; Place 1 patch onto the skin every 72 hours for 30 days. Needs MYLAN    oxyCODONE HCl (OXY-IR) 10 MG immediate release tablet; Take 1 tablet by mouth 4 times daily as needed for Pain for up to 30 days. May fill one to two days early if necessary due to pharmacy hours Max Daily Amount: 40 mg    ESTABLISHED, MOD MDM, 30-39 MIN [[id number]]    Comprehensive Metabolic Panel; Future    CBC with Auto Differential; Future

## 2025-02-04 NOTE — PROGRESS NOTES
Medicare Annual Wellness Visit    Kandace Day is here for Follow-up, Chronic Pain (Refill pain meds and check UDS), Medicare AWV, and Other (Needs new prescription for Erythromycin eye ointment for small infection. )    Assessment & Plan   Medicare annual wellness visit, subsequent    Depression screen mildly +-improving, Fall, Alcohol and Cognitive screens negativeNeed for RSV immunization  -     respiratory syncytial vaccine, adjuvanted (AREXVY) 120 MCG/0.5ML injection; Inject 0.5 mLs into the muscle once for 1 dose, Disp-0.5 mL, R-0Print  Body mass index (BMI) less than 19    BMI handout  Personal history of tobacco use  -    cessation handouts  Screening for cardiovascular condition  -     Lipid Panel; Future  Encounter for screening mammogram for breast cancer  -     DAYSI Digital Screen Bilateral; Future  Need for prophylactic vaccination against Streptococcus pneumoniae (pneumococcus)  -     pneumococcal 20-valent conjugat (PREVNAR) 0.5 ML CARMEN inj; Inject 0.5 mLs into the muscle once for 1 dose, Disp-0.5 mL, R-0Print  Need for prophylactic vaccination against diphtheria-tetanus-pertussis (DTP)  -     Tdap (ADACEL) 5-2-15.5 LF-MCG/0.5 injection; Inject 0.5 mLs into the muscle once for 1 dose, Disp-0.5 mL, R-0Print  Need for prophylactic vaccination and inoculation against varicella  -     zoster recombinant adjuvanted vaccine (SHINGRIX) 50 MCG/0.5ML SUSR injection; Inject 0.5 mLs into the muscle once for 1 dose, Disp-0.5 mL, R-0Print       Return in 6 months (on 8/4/2025).     Subjective       Patient's complete Health Risk Assessment and screening values have been reviewed and are found in Flowsheets. The following problems were reviewed today and where indicated follow up appointments were made and/or referrals ordered.    Positive Risk Factor Screenings with Interventions:      Depression:  PHQ-2 Score: 2  PHQ-9 Total Score: 5  Total Score Interpretation: 5-9 = mild depression  Interventions:  See AVS for 
Panel; Future  •  CBC with Auto Differential; Future    LARISSA (generalized anxiety disorder)   Chronic, at goal (stable), continue current treatment plan  NO sedation with med  Orders:  •  LORazepam (ATIVAN) 1 MG tablet; Take 1 tablet by mouth 3 times daily as needed for Anxiety for up to 90 days. TAKE 1 TABLET BY MOUTH three TIMES DAILY AS NEEDED FOR ANXIETY( MAXIMUM DAILY DOSE IS 4 TABLETS) Indications: anxious may fill 1-2 days early if necessary due to pharmacy hours Max Daily Amount: 3 mg  •  LORazepam (ATIVAN) 1 MG tablet; TAKE 1 TABLET BY MOUTH EVERY EVENING  •  ESTABLISHED, MOD MDM, 30-39 MIN [[id number]]    Chronic migraine with aura without status migrainosus, not intractable   Chronic, at goal (stable), continue current treatment plan  Refill med  Orders:  •  SUMAtriptan (IMITREX) 50 MG tablet; Take 1 tablet by mouth once as needed for Migraine TAKE 1 TABLET BY MOUTH 1 TIME AS NEEDED FOR MIGRAINE  •  ESTABLISHED, MOD MDM, 30-39 MIN [[id number]]  •  Comprehensive Metabolic Panel; Future  •  CBC with Auto Differential; Future    Portal hypertension (HCC)   Monitored by specialist- no acute findings meriting change in the plan  Check CMP  Orders:  •  ESTABLISHED, MOD MDM, 30-39 MIN [[id number]]  •  Comprehensive Metabolic Panel; Future    Current moderate episode of major depressive disorder, unspecified whether recurrent (HCC)    PHQ-9= 5, somewhat improved, still under a lot of stress    Orders:  •  ESTABLISHED, MOD MDM, 30-39 MIN [[id number]]  •  Comprehensive Metabolic Panel; Future  •  CBC with Auto Differential; Future    Lesion of right upper eyelid   Acute condition, new, will refill E mycin Ophth oint to are bid    Orders:  •  erythromycin (ROMYCIN) 5 MG/GM ophthalmic ointment; Apply to eyelid lesion tid x 1 week  •  ESTABLISHED, MOD MDM, 30-39 MIN [[id number]]      Return in 6 months (on 8/4/2025).       Subjective   Chronic Pain  This is a chronic problem. The current episode started more than 1 year ago. The problem occurs

## 2025-02-04 NOTE — ASSESSMENT & PLAN NOTE
Chronic, at goal (stable), continue current treatment plan  Refill med  Orders:    SUMAtriptan (IMITREX) 50 MG tablet; Take 1 tablet by mouth once as needed for Migraine TAKE 1 TABLET BY MOUTH 1 TIME AS NEEDED FOR MIGRAINE    ESTABLISHED, MOD MDM, 30-39 MIN [[id number]]    Comprehensive Metabolic Panel; Future    CBC with Auto Differential; Future

## 2025-02-04 NOTE — ASSESSMENT & PLAN NOTE
Chronic, at goal (stable), continue current treatment plan    Orders:    LORazepam (ATIVAN) 1 MG tablet; Take 1 tablet by mouth 3 times daily as needed for Anxiety for up to 90 days. TAKE 1 TABLET BY MOUTH three TIMES DAILY AS NEEDED FOR ANXIETY( MAXIMUM DAILY DOSE IS 4 TABLETS) Indications: anxious may fill 1-2 days early if necessary due to pharmacy hours Max Daily Amount: 3 mg    fentaNYL (DURAGESIC) 75 MCG/HR; Place 1 patch onto the skin every 72 hours for 30 days. Needs MYLAN    oxyCODONE HCl (OXY-IR) 10 MG immediate release tablet; Take 1 tablet by mouth 4 times daily as needed for Pain for up to 30 days. May fill one to two days early if necessary due to pharmacy hours Max Daily Amount: 40 mg    ESTABLISHED, MOD MDM, 30-39 MIN [[id number]]    Comprehensive Metabolic Panel; Future    CBC with Auto Differential; Future

## 2025-02-06 ENCOUNTER — TELEPHONE (OUTPATIENT)
Dept: FAMILY MEDICINE CLINIC | Facility: CLINIC | Age: 75
End: 2025-02-06

## 2025-02-06 NOTE — TELEPHONE ENCOUNTER
Pt called and stated that pharmacy send a fax yesterday about her medications, pt would like to know if this can be review ASAP pt  is been out of medications for about 2 days.

## 2025-03-05 ENCOUNTER — TELEPHONE (OUTPATIENT)
Dept: FAMILY MEDICINE CLINIC | Facility: CLINIC | Age: 75
End: 2025-03-05

## 2025-03-05 DIAGNOSIS — M15.0 PRIMARY OSTEOARTHRITIS INVOLVING MULTIPLE JOINTS: ICD-10-CM

## 2025-03-05 DIAGNOSIS — M79.7 FIBROMYALGIA: ICD-10-CM

## 2025-03-05 DIAGNOSIS — G89.29 CHRONIC LOW BACK PAIN, UNSPECIFIED BACK PAIN LATERALITY, UNSPECIFIED WHETHER SCIATICA PRESENT: ICD-10-CM

## 2025-03-05 DIAGNOSIS — G89.4 CHRONIC PAIN SYNDROME: ICD-10-CM

## 2025-03-05 DIAGNOSIS — F41.1 GAD (GENERALIZED ANXIETY DISORDER): ICD-10-CM

## 2025-03-05 DIAGNOSIS — M54.50 CHRONIC LOW BACK PAIN, UNSPECIFIED BACK PAIN LATERALITY, UNSPECIFIED WHETHER SCIATICA PRESENT: ICD-10-CM

## 2025-03-05 DIAGNOSIS — F11.20 CONTINUOUS OPIOID DEPENDENCE (HCC): ICD-10-CM

## 2025-03-05 RX ORDER — LORAZEPAM 1 MG/1
1 TABLET ORAL 3 TIMES DAILY PRN
Qty: 90 TABLET | Refills: 2 | Status: SHIPPED | OUTPATIENT
Start: 2025-03-07 | End: 2025-06-05

## 2025-03-05 RX ORDER — FENTANYL 75 UG/1
1 PATCH TRANSDERMAL
Qty: 10 PATCH | Refills: 0 | Status: SHIPPED | OUTPATIENT
Start: 2025-03-23 | End: 2025-04-22

## 2025-03-05 RX ORDER — OXYCODONE HYDROCHLORIDE 10 MG/1
10 TABLET ORAL 4 TIMES DAILY PRN
Qty: 120 TABLET | Refills: 0 | Status: SHIPPED | OUTPATIENT
Start: 2025-03-06 | End: 2025-04-05

## 2025-03-05 RX ORDER — LORAZEPAM 1 MG/1
TABLET ORAL
Qty: 30 TABLET | Refills: 2 | Status: SHIPPED | OUTPATIENT
Start: 2025-03-07 | End: 2025-04-05

## 2025-03-05 NOTE — TELEPHONE ENCOUNTER
Pt called requesting a refill for the following prescription:    oxyCODONE HCl (OXY-IR) 10 MG immediate release tablet      LORazepam (ATIVAN) 1 MG tablet [[id number]       [id number] - [address] - P [phone] - F [phone]

## 2025-03-17 ENCOUNTER — TELEPHONE (OUTPATIENT)
Dept: FAMILY MEDICINE CLINIC | Facility: CLINIC | Age: 75
End: 2025-03-17

## 2025-04-04 ENCOUNTER — TELEPHONE (OUTPATIENT)
Dept: FAMILY MEDICINE CLINIC | Facility: CLINIC | Age: 75
End: 2025-04-04

## 2025-04-04 DIAGNOSIS — M54.50 CHRONIC LOW BACK PAIN, UNSPECIFIED BACK PAIN LATERALITY, UNSPECIFIED WHETHER SCIATICA PRESENT: ICD-10-CM

## 2025-04-04 DIAGNOSIS — M15.0 PRIMARY OSTEOARTHRITIS INVOLVING MULTIPLE JOINTS: ICD-10-CM

## 2025-04-04 DIAGNOSIS — F11.20 CONTINUOUS OPIOID DEPENDENCE (HCC): ICD-10-CM

## 2025-04-04 DIAGNOSIS — G89.29 CHRONIC LOW BACK PAIN, UNSPECIFIED BACK PAIN LATERALITY, UNSPECIFIED WHETHER SCIATICA PRESENT: ICD-10-CM

## 2025-04-04 DIAGNOSIS — F41.1 GAD (GENERALIZED ANXIETY DISORDER): ICD-10-CM

## 2025-04-04 DIAGNOSIS — G89.4 CHRONIC PAIN SYNDROME: ICD-10-CM

## 2025-04-04 DIAGNOSIS — M79.7 FIBROMYALGIA: ICD-10-CM

## 2025-04-04 DIAGNOSIS — G43.E09 CHRONIC MIGRAINE WITH AURA WITHOUT STATUS MIGRAINOSUS, NOT INTRACTABLE: ICD-10-CM

## 2025-04-04 RX ORDER — SUMATRIPTAN 50 MG/1
50 TABLET, FILM COATED ORAL
Qty: 9 TABLET | Refills: 11 | Status: SHIPPED | OUTPATIENT
Start: 2025-04-04

## 2025-04-04 RX ORDER — FENTANYL 75 UG/1
1 PATCH TRANSDERMAL
Qty: 10 PATCH | Refills: 0 | Status: SHIPPED | OUTPATIENT
Start: 2025-04-22 | End: 2025-05-22

## 2025-04-04 RX ORDER — LORAZEPAM 1 MG/1
TABLET ORAL
Qty: 30 TABLET | Refills: 2 | Status: SHIPPED | OUTPATIENT
Start: 2025-04-07 | End: 2025-05-03

## 2025-04-04 RX ORDER — LORAZEPAM 1 MG/1
1 TABLET ORAL 3 TIMES DAILY PRN
Qty: 90 TABLET | Refills: 2 | Status: SHIPPED | OUTPATIENT
Start: 2025-04-07 | End: 2025-07-06

## 2025-04-04 RX ORDER — OXYCODONE HYDROCHLORIDE 10 MG/1
10 TABLET ORAL 4 TIMES DAILY PRN
Qty: 120 TABLET | Refills: 0 | Status: SHIPPED | OUTPATIENT
Start: 2025-04-05 | End: 2025-05-05

## 2025-04-04 NOTE — TELEPHONE ENCOUNTER
Pt called requesting a refill for the following  meds:       oxyCODONE HCl (OXY-IR) 10 MG     LORazepam (ATIVAN) 1 MG tablet     SUMAtriptan (IMITREX) 50 MG tablet         [id number] - [address] - P [phone] - F [phone]

## 2025-05-02 ENCOUNTER — TELEPHONE (OUTPATIENT)
Dept: FAMILY MEDICINE CLINIC | Facility: CLINIC | Age: 75
End: 2025-05-02

## 2025-05-02 DIAGNOSIS — G89.4 CHRONIC PAIN SYNDROME: ICD-10-CM

## 2025-05-02 DIAGNOSIS — F11.20 CONTINUOUS OPIOID DEPENDENCE (HCC): ICD-10-CM

## 2025-05-02 DIAGNOSIS — F41.1 GAD (GENERALIZED ANXIETY DISORDER): ICD-10-CM

## 2025-05-02 DIAGNOSIS — M54.50 CHRONIC LOW BACK PAIN, UNSPECIFIED BACK PAIN LATERALITY, UNSPECIFIED WHETHER SCIATICA PRESENT: ICD-10-CM

## 2025-05-02 DIAGNOSIS — M79.7 FIBROMYALGIA: ICD-10-CM

## 2025-05-02 DIAGNOSIS — F33.0 MILD EPISODE OF RECURRENT MAJOR DEPRESSIVE DISORDER: ICD-10-CM

## 2025-05-02 DIAGNOSIS — M15.0 PRIMARY OSTEOARTHRITIS INVOLVING MULTIPLE JOINTS: ICD-10-CM

## 2025-05-02 DIAGNOSIS — G89.29 CHRONIC LOW BACK PAIN, UNSPECIFIED BACK PAIN LATERALITY, UNSPECIFIED WHETHER SCIATICA PRESENT: ICD-10-CM

## 2025-05-02 DIAGNOSIS — G43.E09 CHRONIC MIGRAINE WITH AURA WITHOUT STATUS MIGRAINOSUS, NOT INTRACTABLE: ICD-10-CM

## 2025-05-02 DIAGNOSIS — R11.2 NAUSEA AND VOMITING, UNSPECIFIED VOMITING TYPE: ICD-10-CM

## 2025-05-02 RX ORDER — LORAZEPAM 1 MG/1
1 TABLET ORAL 3 TIMES DAILY PRN
Qty: 90 TABLET | Refills: 2 | Status: SHIPPED | OUTPATIENT
Start: 2025-05-08 | End: 2025-08-06

## 2025-05-02 RX ORDER — LORAZEPAM 1 MG/1
TABLET ORAL
Qty: 30 TABLET | Refills: 2 | Status: SHIPPED | OUTPATIENT
Start: 2025-05-08 | End: 2025-05-28

## 2025-05-02 RX ORDER — FLUOXETINE 10 MG/1
10 CAPSULE ORAL DAILY
Qty: 90 CAPSULE | Refills: 3 | Status: SHIPPED | OUTPATIENT
Start: 2025-05-02

## 2025-05-02 RX ORDER — SUMATRIPTAN 50 MG/1
50 TABLET, FILM COATED ORAL
Qty: 9 TABLET | Refills: 11 | Status: SHIPPED | OUTPATIENT
Start: 2025-05-02

## 2025-05-02 RX ORDER — ONDANSETRON 4 MG/1
4 TABLET, ORALLY DISINTEGRATING ORAL 3 TIMES DAILY PRN
Qty: 30 TABLET | Refills: 0 | Status: SHIPPED | OUTPATIENT
Start: 2025-05-02

## 2025-05-02 RX ORDER — OXYCODONE HYDROCHLORIDE 10 MG/1
10 TABLET ORAL 4 TIMES DAILY PRN
Qty: 120 TABLET | Refills: 0 | Status: SHIPPED | OUTPATIENT
Start: 2025-05-05 | End: 2025-06-04

## 2025-05-02 NOTE — TELEPHONE ENCOUNTER
Pt needs a refill on all of her medications.  She had to reschedule her appointment for today due to a problem at home.  She will be her on Tuesday.    Please use the [facility] drug store.    Thank you

## 2025-05-20 ENCOUNTER — OFFICE VISIT (OUTPATIENT)
Dept: FAMILY MEDICINE CLINIC | Facility: CLINIC | Age: 75
End: 2025-05-20
Payer: MEDICARE

## 2025-05-20 VITALS
OXYGEN SATURATION: 94 % | RESPIRATION RATE: 18 BRPM | BODY MASS INDEX: 14.83 KG/M2 | DIASTOLIC BLOOD PRESSURE: 88 MMHG | WEIGHT: 89 LBS | HEART RATE: 98 BPM | SYSTOLIC BLOOD PRESSURE: 136 MMHG | TEMPERATURE: 98.8 F | HEIGHT: 65 IN

## 2025-05-20 DIAGNOSIS — F41.1 GAD (GENERALIZED ANXIETY DISORDER): ICD-10-CM

## 2025-05-20 DIAGNOSIS — K74.60 CIRRHOSIS OF LIVER WITH ASCITES, UNSPECIFIED HEPATIC CIRRHOSIS TYPE (HCC): ICD-10-CM

## 2025-05-20 DIAGNOSIS — M41.9 SEVERE SCOLIOSIS: ICD-10-CM

## 2025-05-20 DIAGNOSIS — F17.200 TOBACCO USE DISORDER: ICD-10-CM

## 2025-05-20 DIAGNOSIS — Z13.220 LIPID SCREENING: ICD-10-CM

## 2025-05-20 DIAGNOSIS — M54.50 CHRONIC LOW BACK PAIN, UNSPECIFIED BACK PAIN LATERALITY, UNSPECIFIED WHETHER SCIATICA PRESENT: ICD-10-CM

## 2025-05-20 DIAGNOSIS — G89.4 CHRONIC PAIN SYNDROME: Primary | ICD-10-CM

## 2025-05-20 DIAGNOSIS — R18.8 CIRRHOSIS OF LIVER WITH ASCITES, UNSPECIFIED HEPATIC CIRRHOSIS TYPE (HCC): ICD-10-CM

## 2025-05-20 DIAGNOSIS — K76.6 PORTAL HYPERTENSION (HCC): ICD-10-CM

## 2025-05-20 DIAGNOSIS — M79.7 FIBROMYALGIA: ICD-10-CM

## 2025-05-20 DIAGNOSIS — M15.0 PRIMARY OSTEOARTHRITIS INVOLVING MULTIPLE JOINTS: ICD-10-CM

## 2025-05-20 DIAGNOSIS — F11.20 CONTINUOUS OPIOID DEPENDENCE (HCC): ICD-10-CM

## 2025-05-20 DIAGNOSIS — F33.0 MILD EPISODE OF RECURRENT MAJOR DEPRESSIVE DISORDER: ICD-10-CM

## 2025-05-20 DIAGNOSIS — G89.29 CHRONIC LOW BACK PAIN, UNSPECIFIED BACK PAIN LATERALITY, UNSPECIFIED WHETHER SCIATICA PRESENT: ICD-10-CM

## 2025-05-20 PROCEDURE — G8428 CUR MEDS NOT DOCUMENT: HCPCS | Performed by: FAMILY MEDICINE

## 2025-05-20 PROCEDURE — 3017F COLORECTAL CA SCREEN DOC REV: CPT | Performed by: FAMILY MEDICINE

## 2025-05-20 PROCEDURE — 4004F PT TOBACCO SCREEN RCVD TLK: CPT | Performed by: FAMILY MEDICINE

## 2025-05-20 PROCEDURE — G2211 COMPLEX E/M VISIT ADD ON: HCPCS | Performed by: FAMILY MEDICINE

## 2025-05-20 PROCEDURE — 1123F ACP DISCUSS/DSCN MKR DOCD: CPT | Performed by: FAMILY MEDICINE

## 2025-05-20 PROCEDURE — 1090F PRES/ABSN URINE INCON ASSESS: CPT | Performed by: FAMILY MEDICINE

## 2025-05-20 PROCEDURE — G8419 CALC BMI OUT NRM PARAM NOF/U: HCPCS | Performed by: FAMILY MEDICINE

## 2025-05-20 PROCEDURE — 99214 OFFICE O/P EST MOD 30 MIN: CPT | Performed by: FAMILY MEDICINE

## 2025-05-20 PROCEDURE — G8399 PT W/DXA RESULTS DOCUMENT: HCPCS | Performed by: FAMILY MEDICINE

## 2025-05-20 RX ORDER — OXYCODONE HYDROCHLORIDE 10 MG/1
10 TABLET ORAL 4 TIMES DAILY PRN
Qty: 120 TABLET | Refills: 0 | Status: SHIPPED | OUTPATIENT
Start: 2025-06-04 | End: 2025-07-04

## 2025-05-20 RX ORDER — FENTANYL 75 UG/1
1 PATCH TRANSDERMAL
Qty: 10 PATCH | Refills: 0 | Status: SHIPPED | OUTPATIENT
Start: 2025-05-22 | End: 2025-06-21

## 2025-05-20 ASSESSMENT — PATIENT HEALTH QUESTIONNAIRE - PHQ9: DEPRESSION UNABLE TO ASSESS: PT REFUSES

## 2025-05-20 ASSESSMENT — ENCOUNTER SYMPTOMS
COUGH: 0
VOMITING: 0
DIARRHEA: 0
NAUSEA: 0
SHORTNESS OF BREATH: 0

## 2025-05-20 NOTE — ASSESSMENT & PLAN NOTE
Chronic, not at goal (unstable), lifestyle modifications recommended  Is up 2 lbs, keep working on it

## 2025-05-20 NOTE — ASSESSMENT & PLAN NOTE
Chronic, not at goal (unstable), lifestyle modifications recommended  Refer to ortho spine team for evaluation/treatment

## 2025-05-20 NOTE — ASSESSMENT & PLAN NOTE
Chronic, not at goal (unstable), lifestyle modifications recommended  stable  Orders:    fentaNYL (DURAGESIC) 75 MCG/HR; Place 1 patch onto the skin every 72 hours for 30 days. Needs MYLAN    oxyCODONE HCl (OXY-IR) 10 MG immediate release tablet; Take 1 tablet by mouth 4 times daily as needed for Pain for up to 30 days. May fill one to two days early if necessary due to pharmacy hours Max Daily Amount: 40 mg

## 2025-05-20 NOTE — ASSESSMENT & PLAN NOTE
Chronic, at goal (stable), continue current treatment plan  Due to severe scoliosis, OA of multiple joints- stable on meds, no sedation/diversion  Orders:    fentaNYL (DURAGESIC) 75 MCG/HR; Place 1 patch onto the skin every 72 hours for 30 days. Needs MYLAN    oxyCODONE HCl (OXY-IR) 10 MG immediate release tablet; Take 1 tablet by mouth 4 times daily as needed for Pain for up to 30 days. May fill one to two days early if necessary due to pharmacy hours Max Daily Amount: 40 mg

## 2025-05-20 NOTE — ASSESSMENT & PLAN NOTE
Chronic, at goal (stable), continue current treatment plan  stable  Orders:    fentaNYL (DURAGESIC) 75 MCG/HR; Place 1 patch onto the skin every 72 hours for 30 days. Needs MYLAN    oxyCODONE HCl (OXY-IR) 10 MG immediate release tablet; Take 1 tablet by mouth 4 times daily as needed for Pain for up to 30 days. May fill one to two days early if necessary due to pharmacy hours Max Daily Amount: 40 mg

## 2025-05-20 NOTE — ASSESSMENT & PLAN NOTE
Chronic, at goal (stable), continue current treatment plan  Stable on meds, no sedation/diversion  Orders:    fentaNYL (DURAGESIC) 75 MCG/HR; Place 1 patch onto the skin every 72 hours for 30 days. Needs MYLAN    oxyCODONE HCl (OXY-IR) 10 MG immediate release tablet; Take 1 tablet by mouth 4 times daily as needed for Pain for up to 30 days. May fill one to two days early if necessary due to pharmacy hours Max Daily Amount: 40 mg    - I have reviewed the patient’s controlled substance prescription history, as maintained in the [place] prescription monitoring program, so that the prescription(s) for a  controlled substance can be given.  - Patient is aware of addictive potential of medication.   - Patient is aware of respiratory suppression and is not experiencing any sedation with medication.   - Patient denies sedation or other side effects from medication  - Patient is instructed on compliance with dosing schedule and acknowledges that no early refill will be provided in the event of non-compliance, theft or loss of medication.   - Patient is aware that they may be subject to random drug testing and pill counts.   This is a chronic problem that is stable.  Per review of available records and patient’s , there are not sign of overuse, misuse, diversion, or concerning side effects. Today we reviewed: the goals of treatment (improve functionality, quality of life, and pain) alternative treatment options including non-narcotic modalities The following changes were made to the patient’s current treatment plan: nothing, medications refilled.

## 2025-05-20 NOTE — PROGRESS NOTES
hand, right hand and left ribs. The pain is severe. The symptoms are aggravated by any movement. Pertinent negatives include no chest pain, diarrhea, fatigue, nausea, shortness of breath or vomiting. Past treatments include prescription narcotic. The treatment provided significant relief. She has been Behaving normally.     Review of Systems   Constitutional:  Negative for chills and fatigue.   Respiratory:  Negative for cough and shortness of breath.    Cardiovascular:  Negative for chest pain and leg swelling.   Gastrointestinal:  Negative for diarrhea, nausea and vomiting.   Psychiatric/Behavioral:  Positive for decreased concentration and depression. The patient is nervous/anxious.         Objective   Physical Exam  Vitals and nursing note reviewed.   Constitutional:       General: She is not in acute distress.     Appearance: Normal appearance.   HENT:      Head: Normocephalic and atraumatic.      Nose: Nose normal. No congestion or rhinorrhea.      Mouth/Throat:      Pharynx: Oropharynx is clear. No oropharyngeal exudate or posterior oropharyngeal erythema.   Eyes:      Extraocular Movements: Extraocular movements intact.      Conjunctiva/sclera: Conjunctivae normal.   Cardiovascular:      Rate and Rhythm: Normal rate and regular rhythm.      Pulses: Normal pulses.      Heart sounds: Normal heart sounds.   Pulmonary:      Effort: Pulmonary effort is normal. No respiratory distress.      Breath sounds: Normal breath sounds. No wheezing, rhonchi or rales.   Abdominal:      General: Bowel sounds are normal. There is no distension.      Palpations: Abdomen is soft.      Tenderness: There is no abdominal tenderness.   Musculoskeletal:         General: Tenderness and deformity present. No swelling. Normal range of motion.      Cervical back: Normal range of motion and neck supple.      Comments: Severe scoliosis, left ribs inside of iliac crest   Skin:     General: Skin is warm and dry.   Neurological:      General:

## 2025-05-21 ENCOUNTER — TELEPHONE (OUTPATIENT)
Dept: FAMILY MEDICINE CLINIC | Facility: CLINIC | Age: 75
End: 2025-05-21

## 2025-05-21 NOTE — TELEPHONE ENCOUNTER
----- Message from DELICIA ESTES MA sent at 5/20/2025  4:12 PM EDT -----  Regarding: FW: Lab Reorder    ----- Message -----  From: Venus Calvo  Sent: 5/20/2025   4:11 PM EDT  To: [facility] Clinical [facility]  Subject: Lab Reorder                                      The following lab test(s) were not completed.  Lab tests:  CBC, CMP, lipid panel  Recollect reason: Unable to obtain blood.    Please consult with the ordering physician/APC if the tests are needed.    If this test requires recollection, please re-enter order in Epic within 24 hours of this notice and respond to this message as Client Services will contact the patient.     If this test DOES NOT require recollection, document this in Epic documentation only encounter.    If you need additional information, respond to this message and/or call [phone].    [facility] Client Services

## 2025-05-23 DIAGNOSIS — K76.6 PORTAL HYPERTENSION (HCC): Primary | ICD-10-CM

## 2025-05-23 DIAGNOSIS — M79.7 FIBROMYALGIA: ICD-10-CM

## 2025-05-23 DIAGNOSIS — Z13.220 LIPID SCREENING: ICD-10-CM

## 2025-06-13 ENCOUNTER — TELEPHONE (OUTPATIENT)
Dept: FAMILY MEDICINE CLINIC | Facility: CLINIC | Age: 75
End: 2025-06-13

## 2025-06-13 DIAGNOSIS — G89.4 CHRONIC PAIN SYNDROME: ICD-10-CM

## 2025-06-13 DIAGNOSIS — M54.50 CHRONIC LOW BACK PAIN, UNSPECIFIED BACK PAIN LATERALITY, UNSPECIFIED WHETHER SCIATICA PRESENT: ICD-10-CM

## 2025-06-13 DIAGNOSIS — G89.29 CHRONIC LOW BACK PAIN, UNSPECIFIED BACK PAIN LATERALITY, UNSPECIFIED WHETHER SCIATICA PRESENT: ICD-10-CM

## 2025-06-13 DIAGNOSIS — M15.0 PRIMARY OSTEOARTHRITIS INVOLVING MULTIPLE JOINTS: ICD-10-CM

## 2025-06-13 DIAGNOSIS — M79.7 FIBROMYALGIA: ICD-10-CM

## 2025-06-13 DIAGNOSIS — F11.20 CONTINUOUS OPIOID DEPENDENCE (HCC): ICD-10-CM

## 2025-06-13 RX ORDER — FENTANYL 75 UG/1
1 PATCH TRANSDERMAL
Qty: 10 PATCH | Refills: 0 | Status: SHIPPED | OUTPATIENT
Start: 2025-06-21 | End: 2025-07-21

## 2025-06-13 NOTE — TELEPHONE ENCOUNTER
Pt stated provider forgot to call in this medication for her.     fentaNYL (DURAGESIC) 75 MCG/HR     [id number] - [facility] [place] - [address] [place] [address]

## 2025-06-13 NOTE — TELEPHONE ENCOUNTER
eRxd for fill on 6/21 when due    - I have reviewed the patient’s controlled substance prescription history, as maintained in the [place] prescription monitoring program, so that the prescription(s) for a  controlled substance can be given.  - Patient is aware of addictive potential of medication.   - Patient is aware of respiratory suppression and is not experiencing any sedation with medication.   - Patient denies sedation or other side effects from medication  - Patient is instructed on compliance with dosing schedule and acknowledges that no early refill will be provided in the event of non-compliance, theft or loss of medication.   - Patient is aware that they may be subject to random drug testing and pill counts.

## 2025-07-03 DIAGNOSIS — F41.1 GAD (GENERALIZED ANXIETY DISORDER): ICD-10-CM

## 2025-07-03 DIAGNOSIS — G89.29 CHRONIC LOW BACK PAIN, UNSPECIFIED BACK PAIN LATERALITY, UNSPECIFIED WHETHER SCIATICA PRESENT: ICD-10-CM

## 2025-07-03 DIAGNOSIS — M15.0 PRIMARY OSTEOARTHRITIS INVOLVING MULTIPLE JOINTS: ICD-10-CM

## 2025-07-03 DIAGNOSIS — G89.4 CHRONIC PAIN SYNDROME: ICD-10-CM

## 2025-07-03 DIAGNOSIS — M54.50 CHRONIC LOW BACK PAIN, UNSPECIFIED BACK PAIN LATERALITY, UNSPECIFIED WHETHER SCIATICA PRESENT: ICD-10-CM

## 2025-07-03 DIAGNOSIS — F11.20 CONTINUOUS OPIOID DEPENDENCE (HCC): ICD-10-CM

## 2025-07-03 DIAGNOSIS — M79.7 FIBROMYALGIA: ICD-10-CM

## 2025-07-03 RX ORDER — LORAZEPAM 1 MG/1
1 TABLET ORAL 3 TIMES DAILY PRN
Qty: 90 TABLET | Refills: 2 | Status: SHIPPED | OUTPATIENT
Start: 2025-07-06 | End: 2025-10-04

## 2025-07-03 RX ORDER — FENTANYL 75 UG/1
1 PATCH TRANSDERMAL
Qty: 10 PATCH | Refills: 0 | Status: SHIPPED | OUTPATIENT
Start: 2025-07-21 | End: 2025-08-20

## 2025-07-03 RX ORDER — OXYCODONE HYDROCHLORIDE 10 MG/1
10 TABLET ORAL 4 TIMES DAILY PRN
Qty: 120 TABLET | Refills: 0 | Status: SHIPPED | OUTPATIENT
Start: 2025-07-06 | End: 2025-08-05

## 2025-07-03 RX ORDER — LORAZEPAM 1 MG/1
TABLET ORAL
Qty: 30 TABLET | Refills: 2 | Status: SHIPPED | OUTPATIENT
Start: 2025-07-06 | End: 2025-07-23

## 2025-07-03 NOTE — TELEPHONE ENCOUNTER
Pt called requesting med refill        LORazepam (ATIVAN) 1 MG tablet   (Stated two bottles)    oxyCODONE HCl (OXY-IR) 10 MG immediate release tablet     fentaNYL (DURAGESIC) 75 MCG/HR       [id number] - [address]

## 2025-07-07 ENCOUNTER — TELEPHONE (OUTPATIENT)
Dept: FAMILY MEDICINE CLINIC | Facility: CLINIC | Age: 75
End: 2025-07-07

## 2025-07-07 NOTE — TELEPHONE ENCOUNTER
----- Message from Na LUTHER sent at 7/7/2025  3:21 PM EDT -----  Regarding: ECC Message to Provider  ECC Message to Provider    Relationship to Patient: Self     Additional Information: patient expected that her appt is for 7/10/2025 however she was scheduled today 7/7/2025 and she can't make it to the appointment.   --------------------------------------------------------------------------------------------------------------------------    Call Back Information: OK to leave message on voicemail  Preferred Call Back Number: Phone [phone]

## 2025-07-15 ENCOUNTER — OFFICE VISIT (OUTPATIENT)
Dept: FAMILY MEDICINE CLINIC | Facility: CLINIC | Age: 75
End: 2025-07-15
Payer: MEDICARE

## 2025-07-15 VITALS
HEART RATE: 99 BPM | BODY MASS INDEX: 14.66 KG/M2 | TEMPERATURE: 98 F | RESPIRATION RATE: 18 BRPM | WEIGHT: 88 LBS | HEIGHT: 65 IN | OXYGEN SATURATION: 98 % | DIASTOLIC BLOOD PRESSURE: 82 MMHG | SYSTOLIC BLOOD PRESSURE: 136 MMHG

## 2025-07-15 DIAGNOSIS — F11.20 CONTINUOUS OPIOID DEPENDENCE (HCC): ICD-10-CM

## 2025-07-15 DIAGNOSIS — R18.8 CIRRHOSIS OF LIVER WITH ASCITES, UNSPECIFIED HEPATIC CIRRHOSIS TYPE (HCC): ICD-10-CM

## 2025-07-15 DIAGNOSIS — M54.50 CHRONIC LOW BACK PAIN, UNSPECIFIED BACK PAIN LATERALITY, UNSPECIFIED WHETHER SCIATICA PRESENT: ICD-10-CM

## 2025-07-15 DIAGNOSIS — F41.1 GAD (GENERALIZED ANXIETY DISORDER): ICD-10-CM

## 2025-07-15 DIAGNOSIS — M15.0 PRIMARY OSTEOARTHRITIS INVOLVING MULTIPLE JOINTS: ICD-10-CM

## 2025-07-15 DIAGNOSIS — Z13.220 LIPID SCREENING: ICD-10-CM

## 2025-07-15 DIAGNOSIS — M41.9 SEVERE SCOLIOSIS: ICD-10-CM

## 2025-07-15 DIAGNOSIS — K74.60 CIRRHOSIS OF LIVER WITH ASCITES, UNSPECIFIED HEPATIC CIRRHOSIS TYPE (HCC): ICD-10-CM

## 2025-07-15 DIAGNOSIS — I89.0 LYMPHEDEMA OF LOWER EXTREMITY: ICD-10-CM

## 2025-07-15 DIAGNOSIS — M79.7 FIBROMYALGIA: ICD-10-CM

## 2025-07-15 DIAGNOSIS — G89.4 CHRONIC PAIN SYNDROME: Primary | ICD-10-CM

## 2025-07-15 DIAGNOSIS — F17.200 TOBACCO USE DISORDER: ICD-10-CM

## 2025-07-15 DIAGNOSIS — G89.29 CHRONIC LOW BACK PAIN, UNSPECIFIED BACK PAIN LATERALITY, UNSPECIFIED WHETHER SCIATICA PRESENT: ICD-10-CM

## 2025-07-15 LAB
ALBUMIN SERPL-MCNC: 3.7 G/DL (ref 3.2–4.6)
ALBUMIN/GLOB SERPL: 1.1 (ref 1–1.9)
ALP SERPL-CCNC: 96 U/L (ref 35–104)
ALT SERPL-CCNC: 16 U/L (ref 8–45)
ANION GAP SERPL CALC-SCNC: 12 MMOL/L (ref 7–16)
AST SERPL-CCNC: 37 U/L (ref 15–37)
BASOPHILS # BLD: 0.04 K/UL (ref 0–0.2)
BASOPHILS NFR BLD: 0.9 % (ref 0–2)
BILIRUB SERPL-MCNC: 0.6 MG/DL (ref 0–1.2)
BUN SERPL-MCNC: 9 MG/DL (ref 8–23)
CALCIUM SERPL-MCNC: 8.9 MG/DL (ref 8.8–10.2)
CHLORIDE SERPL-SCNC: 99 MMOL/L (ref 98–107)
CHOLEST SERPL-MCNC: 142 MG/DL (ref 0–200)
CO2 SERPL-SCNC: 27 MMOL/L (ref 20–29)
CREAT SERPL-MCNC: 0.71 MG/DL (ref 0.6–1.1)
DIFFERENTIAL METHOD BLD: ABNORMAL
EOSINOPHIL # BLD: 0 K/UL (ref 0–0.8)
EOSINOPHIL NFR BLD: 0 % (ref 0.5–7.8)
ERYTHROCYTE [DISTWIDTH] IN BLOOD BY AUTOMATED COUNT: 13.6 % (ref 11.9–14.6)
GLOBULIN SER CALC-MCNC: 3.5 G/DL (ref 2.3–3.5)
GLUCOSE SERPL-MCNC: 137 MG/DL (ref 70–99)
HCT VFR BLD AUTO: 41.8 % (ref 35.8–46.3)
HDLC SERPL-MCNC: 60 MG/DL (ref 40–60)
HDLC SERPL: 2.4 (ref 0–5)
HGB BLD-MCNC: 13.8 G/DL (ref 11.7–15.4)
IMM GRANULOCYTES # BLD AUTO: 0.01 K/UL (ref 0–0.5)
IMM GRANULOCYTES NFR BLD AUTO: 0.2 % (ref 0–5)
LDLC SERPL CALC-MCNC: 65 MG/DL (ref 0–100)
LYMPHOCYTES # BLD: 1.24 K/UL (ref 0.5–4.6)
LYMPHOCYTES NFR BLD: 26.7 % (ref 13–44)
MCH RBC QN AUTO: 34.3 PG (ref 26.1–32.9)
MCHC RBC AUTO-ENTMCNC: 33 G/DL (ref 31.4–35)
MCV RBC AUTO: 104 FL (ref 82–102)
MONOCYTES # BLD: 0.34 K/UL (ref 0.1–1.3)
MONOCYTES NFR BLD: 7.3 % (ref 4–12)
NEUTS SEG # BLD: 3.02 K/UL (ref 1.7–8.2)
NEUTS SEG NFR BLD: 64.9 % (ref 43–78)
NRBC # BLD: 0 K/UL (ref 0–0.2)
PLATELET # BLD AUTO: 169 K/UL (ref 150–450)
PMV BLD AUTO: 10.2 FL (ref 9.4–12.3)
POTASSIUM SERPL-SCNC: 3.6 MMOL/L (ref 3.5–5.1)
PROT SERPL-MCNC: 7.1 G/DL (ref 6.3–8.2)
RBC # BLD AUTO: 4.02 M/UL (ref 4.05–5.2)
SODIUM SERPL-SCNC: 138 MMOL/L (ref 136–145)
TRIGL SERPL-MCNC: 85 MG/DL (ref 0–150)
VLDLC SERPL CALC-MCNC: 17 MG/DL (ref 6–23)
WBC # BLD AUTO: 4.7 K/UL (ref 4.3–11.1)

## 2025-07-15 PROCEDURE — G8427 DOCREV CUR MEDS BY ELIG CLIN: HCPCS | Performed by: FAMILY MEDICINE

## 2025-07-15 PROCEDURE — G8399 PT W/DXA RESULTS DOCUMENT: HCPCS | Performed by: FAMILY MEDICINE

## 2025-07-15 PROCEDURE — 1123F ACP DISCUSS/DSCN MKR DOCD: CPT | Performed by: FAMILY MEDICINE

## 2025-07-15 PROCEDURE — 1160F RVW MEDS BY RX/DR IN RCRD: CPT | Performed by: FAMILY MEDICINE

## 2025-07-15 PROCEDURE — G2211 COMPLEX E/M VISIT ADD ON: HCPCS | Performed by: FAMILY MEDICINE

## 2025-07-15 PROCEDURE — 1159F MED LIST DOCD IN RCRD: CPT | Performed by: FAMILY MEDICINE

## 2025-07-15 PROCEDURE — 99214 OFFICE O/P EST MOD 30 MIN: CPT | Performed by: FAMILY MEDICINE

## 2025-07-15 PROCEDURE — 3017F COLORECTAL CA SCREEN DOC REV: CPT | Performed by: FAMILY MEDICINE

## 2025-07-15 PROCEDURE — 4004F PT TOBACCO SCREEN RCVD TLK: CPT | Performed by: FAMILY MEDICINE

## 2025-07-15 PROCEDURE — G8419 CALC BMI OUT NRM PARAM NOF/U: HCPCS | Performed by: FAMILY MEDICINE

## 2025-07-15 PROCEDURE — 1090F PRES/ABSN URINE INCON ASSESS: CPT | Performed by: FAMILY MEDICINE

## 2025-07-15 RX ORDER — OXYCODONE HYDROCHLORIDE 10 MG/1
10 TABLET ORAL 4 TIMES DAILY PRN
Qty: 120 TABLET | Refills: 0 | Status: SHIPPED | OUTPATIENT
Start: 2025-08-05 | End: 2025-09-04

## 2025-07-15 RX ORDER — LORAZEPAM 1 MG/1
1 TABLET ORAL 3 TIMES DAILY PRN
Qty: 90 TABLET | Refills: 2 | Status: SHIPPED | OUTPATIENT
Start: 2025-08-05 | End: 2025-11-03

## 2025-07-15 RX ORDER — LORAZEPAM 1 MG/1
TABLET ORAL
Qty: 30 TABLET | Refills: 2 | Status: SHIPPED | OUTPATIENT
Start: 2025-07-15 | End: 2025-08-01

## 2025-07-15 RX ORDER — FENTANYL 75 UG/1
1 PATCH TRANSDERMAL
Qty: 10 PATCH | Refills: 0 | Status: SHIPPED | OUTPATIENT
Start: 2025-07-21 | End: 2025-08-20

## 2025-07-15 ASSESSMENT — ENCOUNTER SYMPTOMS
COUGH: 0
NAUSEA: 0
DIARRHEA: 0
VOMITING: 0
SHORTNESS OF BREATH: 0

## 2025-07-15 ASSESSMENT — PATIENT HEALTH QUESTIONNAIRE - PHQ9: DEPRESSION UNABLE TO ASSESS: PT REFUSES

## 2025-07-15 NOTE — ASSESSMENT & PLAN NOTE
Chronic, not at goal (unstable), continue current treatment plan    Orders:    AMB POC DRUG SCREEN, URINE    CBC with Auto Differential; Future    External Referral to Physical Therapy    [facility]

## 2025-07-15 NOTE — ASSESSMENT & PLAN NOTE
Chronic, at goal (stable), continue current treatment plan    Orders:    fentaNYL (DURAGESIC) 75 MCG/HR; Place 1 patch onto the skin every 72 hours for 30 days. Needs MYLAN    LORazepam (ATIVAN) 1 MG tablet; Take 1 tablet by mouth 3 times daily as needed for Anxiety for up to 90 days. TAKE 1 TABLET BY MOUTH three TIMES DAILY AS NEEDED FOR ANXIETY( MAXIMUM DAILY DOSE IS 4 TABLETS) Indications: anxious may fill 1-2 days early if necessary due to pharmacy hours Max Daily Amount: 3 mg    oxyCODONE HCl (OXY-IR) 10 MG immediate release tablet; Take 1 tablet by mouth 4 times daily as needed for Pain for up to 30 days. May fill one to two days early if necessary due to pharmacy hours Max Daily Amount: 40 mg

## 2025-07-15 NOTE — PROGRESS NOTES
Kandace Day (:  1950) is a 75 y.o. female,Established patient, here for evaluation of the following chief complaint(s):  Follow-up, Chronic Pain (Stable on med, UDS due), and Anxiety (Stable on med,no sedation)         Assessment & Plan  Chronic pain syndrome   Chronic, not at goal (unstable), continue current treatment plan  Stable on meds  Orders:  •  AMB POC DRUG SCREEN, URINE= +oxy and benzo  •  CBC with Auto Differential; Future  •  Comprehensive Metabolic Panel; Future  •  fentaNYL (DURAGESIC) 75 MCG/HR; Place 1 patch onto the skin every 72 hours for 30 days. Needs MYLAN  •  LORazepam (ATIVAN) 1 MG tablet; Take 1 tablet by mouth 3 times daily as needed for Anxiety for up to 90 days. TAKE 1 TABLET BY MOUTH three TIMES DAILY AS NEEDED FOR ANXIETY( MAXIMUM DAILY DOSE IS 4 TABLETS) Indications: anxious may fill 1-2 days early if necessary due to pharmacy hours Max Daily Amount: 3 mg  •  oxyCODONE HCl (OXY-IR) 10 MG immediate release tablet; Take 1 tablet by mouth 4 times daily as needed for Pain for up to 30 days. May fill one to two days early if necessary due to pharmacy hours Max Daily Amount: 40 mg    - I have reviewed the patient’s controlled substance prescription history, as maintained in the [place] prescription monitoring program, so that the prescription(s) for a  controlled substance can be given.  - Patient is aware of addictive potential of medication.   - Patient is aware of respiratory suppression and is not experiencing any sedation with medication.   - Patient denies sedation or other side effects from medication  - Patient is instructed on compliance with dosing schedule and acknowledges that no early refill will be provided in the event of non-compliance, theft or loss of medication.   - Patient is aware that they may be subject to random drug testing and pill counts.   This is a chronic problem that is stable.  Per review of available records and patient’s , there are

## 2025-07-15 NOTE — ASSESSMENT & PLAN NOTE
Chronic, not at goal (unstable), continue current treatment plan  Stable on meds  Orders:    AMB POC DRUG SCREEN, URINE= +oxy and benzo    CBC with Auto Differential; Future    Comprehensive Metabolic Panel; Future    fentaNYL (DURAGESIC) 75 MCG/HR; Place 1 patch onto the skin every 72 hours for 30 days. Needs MYLAN    LORazepam (ATIVAN) 1 MG tablet; Take 1 tablet by mouth 3 times daily as needed for Anxiety for up to 90 days. TAKE 1 TABLET BY MOUTH three TIMES DAILY AS NEEDED FOR ANXIETY( MAXIMUM DAILY DOSE IS 4 TABLETS) Indications: anxious may fill 1-2 days early if necessary due to pharmacy hours Max Daily Amount: 3 mg    oxyCODONE HCl (OXY-IR) 10 MG immediate release tablet; Take 1 tablet by mouth 4 times daily as needed for Pain for up to 30 days. May fill one to two days early if necessary due to pharmacy hours Max Daily Amount: 40 mg    - I have reviewed the patient’s controlled substance prescription history, as maintained in the [place] prescription monitoring program, so that the prescription(s) for a  controlled substance can be given.  - Patient is aware of addictive potential of medication.   - Patient is aware of respiratory suppression and is not experiencing any sedation with medication.   - Patient denies sedation or other side effects from medication  - Patient is instructed on compliance with dosing schedule and acknowledges that no early refill will be provided in the event of non-compliance, theft or loss of medication.   - Patient is aware that they may be subject to random drug testing and pill counts.   This is a chronic problem that is stable.  Per review of available records and patient’s , there are not sign of overuse, misuse, diversion, or concerning side effects. Today we reviewed: the goals of treatment (improve functionality, quality of life, and pain) alternative treatment options including non-narcotic modalities The following changes were made to the patient’s current

## 2025-07-15 NOTE — ASSESSMENT & PLAN NOTE
Chronic, at goal (stable), continue current treatment plan    Orders:    AMB POC DRUG SCREEN, URINE= + Oxy and benzo(fentanyl not on our cups)    CBC with Auto Differential; Future    fentaNYL (DURAGESIC) 75 MCG/HR; Place 1 patch onto the skin every 72 hours for 30 days. Needs MYLAN    LORazepam (ATIVAN) 1 MG tablet; Take 1 tablet by mouth 3 times daily as needed for Anxiety for up to 90 days. TAKE 1 TABLET BY MOUTH three TIMES DAILY AS NEEDED FOR ANXIETY( MAXIMUM DAILY DOSE IS 4 TABLETS) Indications: anxious may fill 1-2 days early if necessary due to pharmacy hours Max Daily Amount: 3 mg    oxyCODONE HCl (OXY-IR) 10 MG immediate release tablet; Take 1 tablet by mouth 4 times daily as needed for Pain for up to 30 days. May fill one to two days early if necessary due to pharmacy hours Max Daily Amount: 40 mg    External Referral to Physical Therapy

## 2025-07-15 NOTE — ASSESSMENT & PLAN NOTE
Chronic, at goal (stable), continue current treatment plan    Orders:    AMB POC DRUG SCREEN, URINE    CBC with Auto Differential; Future    fentaNYL (DURAGESIC) 75 MCG/HR; Place 1 patch onto the skin every 72 hours for 30 days. Needs MYLAN    LORazepam (ATIVAN) 1 MG tablet; Take 1 tablet by mouth 3 times daily as needed for Anxiety for up to 90 days. TAKE 1 TABLET BY MOUTH three TIMES DAILY AS NEEDED FOR ANXIETY( MAXIMUM DAILY DOSE IS 4 TABLETS) Indications: anxious may fill 1-2 days early if necessary due to pharmacy hours Max Daily Amount: 3 mg    oxyCODONE HCl (OXY-IR) 10 MG immediate release tablet; Take 1 tablet by mouth 4 times daily as needed for Pain for up to 30 days. May fill one to two days early if necessary due to pharmacy hours Max Daily Amount: 40 mg

## 2025-07-16 ENCOUNTER — TELEPHONE (OUTPATIENT)
Dept: FAMILY MEDICINE CLINIC | Facility: CLINIC | Age: 75
End: 2025-07-16

## 2025-07-16 DIAGNOSIS — I89.0 LYMPHEDEMA: Primary | ICD-10-CM

## 2025-07-16 NOTE — TELEPHONE ENCOUNTER
Vianey from [facility] called on pts behalf stating they would need 2 separate referrals because in there clinic the occupational therapist treat lymphoedema

## 2025-07-17 LAB
EST. AVERAGE GLUCOSE BLD GHB EST-MCNC: 122 MG/DL
HBA1C MFR BLD: 5.9 % (ref 0–5.6)

## 2025-08-07 DIAGNOSIS — G89.4 CHRONIC PAIN SYNDROME: ICD-10-CM

## 2025-08-07 DIAGNOSIS — M54.50 CHRONIC LOW BACK PAIN, UNSPECIFIED BACK PAIN LATERALITY, UNSPECIFIED WHETHER SCIATICA PRESENT: ICD-10-CM

## 2025-08-07 DIAGNOSIS — G89.29 CHRONIC LOW BACK PAIN, UNSPECIFIED BACK PAIN LATERALITY, UNSPECIFIED WHETHER SCIATICA PRESENT: ICD-10-CM

## 2025-08-07 DIAGNOSIS — M79.7 FIBROMYALGIA: ICD-10-CM

## 2025-08-07 DIAGNOSIS — F41.1 GAD (GENERALIZED ANXIETY DISORDER): ICD-10-CM

## 2025-08-07 DIAGNOSIS — F11.20 CONTINUOUS OPIOID DEPENDENCE (HCC): ICD-10-CM

## 2025-08-07 DIAGNOSIS — M15.0 PRIMARY OSTEOARTHRITIS INVOLVING MULTIPLE JOINTS: ICD-10-CM

## 2025-08-07 RX ORDER — LORAZEPAM 1 MG/1
1 TABLET ORAL 3 TIMES DAILY PRN
Qty: 90 TABLET | OUTPATIENT
Start: 2025-08-07

## 2025-08-07 RX ORDER — LORAZEPAM 1 MG/1
1 TABLET ORAL 3 TIMES DAILY PRN
Qty: 90 TABLET | Refills: 2 | Status: SHIPPED | OUTPATIENT
Start: 2025-08-07 | End: 2025-11-05

## 2025-08-07 RX ORDER — LORAZEPAM 1 MG/1
TABLET ORAL
Qty: 30 TABLET | Refills: 2 | Status: SHIPPED | OUTPATIENT
Start: 2025-08-07 | End: 2025-08-24

## 2025-09-03 ENCOUNTER — TELEPHONE (OUTPATIENT)
Dept: FAMILY MEDICINE CLINIC | Facility: CLINIC | Age: 75
End: 2025-09-03

## 2025-09-04 DIAGNOSIS — G89.29 CHRONIC LOW BACK PAIN, UNSPECIFIED BACK PAIN LATERALITY, UNSPECIFIED WHETHER SCIATICA PRESENT: ICD-10-CM

## 2025-09-04 DIAGNOSIS — G89.4 CHRONIC PAIN SYNDROME: ICD-10-CM

## 2025-09-04 DIAGNOSIS — F11.20 CONTINUOUS OPIOID DEPENDENCE (HCC): ICD-10-CM

## 2025-09-04 DIAGNOSIS — M15.0 PRIMARY OSTEOARTHRITIS INVOLVING MULTIPLE JOINTS: ICD-10-CM

## 2025-09-04 DIAGNOSIS — M54.50 CHRONIC LOW BACK PAIN, UNSPECIFIED BACK PAIN LATERALITY, UNSPECIFIED WHETHER SCIATICA PRESENT: ICD-10-CM

## 2025-09-04 DIAGNOSIS — M79.7 FIBROMYALGIA: ICD-10-CM

## 2025-09-04 RX ORDER — FENTANYL 75 UG/1
1 PATCH TRANSDERMAL
Qty: 10 PATCH | Refills: 0 | Status: SHIPPED | OUTPATIENT
Start: 2025-09-19 | End: 2025-10-19

## 2025-09-04 RX ORDER — OXYCODONE HYDROCHLORIDE 10 MG/1
10 TABLET ORAL 4 TIMES DAILY PRN
Qty: 120 TABLET | Refills: 0 | Status: SHIPPED | OUTPATIENT
Start: 2025-09-06 | End: 2025-10-06

## (undated) DEVICE — SOL ANTI-FOG 6ML MEDC -- MEDICHOICE - CONVERT TO 358427

## (undated) DEVICE — GUIDEWIRE ENDOSCP L260CM DIA0.035IN STD BILI HYDRPHLC EXT

## (undated) DEVICE — TRAY PREP DRY W/ PREM GLV 2 APPL 6 SPNG 2 UNDPD 1 OVERWRAP

## (undated) DEVICE — (D)SYR 10ML 1/5ML GRAD NSAF -- PKGING CHANGE USE ITEM 338027

## (undated) DEVICE — SLIM BODY SKIN STAPLER: Brand: APPOSE ULC

## (undated) DEVICE — DEVICE LCK BILI RAP EXCHG OLPS --

## (undated) DEVICE — ADULT SPO2 SENSOR: Brand: NELLCOR

## (undated) DEVICE — BLOCK BITE AD 60FR W/ VELC STRP ADDRESSES MOST PT AND

## (undated) DEVICE — LAP CHOLE: Brand: MEDLINE INDUSTRIES, INC.

## (undated) DEVICE — BLADELESS OPTICAL TROCAR WITH FIXATION CANNULA: Brand: VERSAPORT

## (undated) DEVICE — NDL PRT INJ NSAF BLNT 18GX1.5 --

## (undated) DEVICE — KENDALL RADIOLUCENT FOAM MONITORING ELECTRODE RECTANGULAR SHAPE: Brand: KENDALL

## (undated) DEVICE — SYR 3ML LL TIP 1/10ML GRAD --

## (undated) DEVICE — STANDARD HYPODERMIC NEEDLE,POLYPROPYLENE HUB: Brand: MONOJECT

## (undated) DEVICE — SUTURE SZ 0 27IN 5/8 CIR UR-6  TAPER PT VIOLET ABSRB VICRYL J603H

## (undated) DEVICE — SYR 5ML 1/5 GRAD LL NSAF LF --

## (undated) DEVICE — UNIVERSAL FIXATION CANNULA: Brand: VERSAONE

## (undated) DEVICE — REM POLYHESIVE ADULT PATIENT RETURN ELECTRODE: Brand: VALLEYLAB

## (undated) DEVICE — AMD ANTIMICROBIAL NON-ADHERENT PAD,0.2% POLYHEXAMETHYLENE BIGUANIDE HCI (PHMB): Brand: TELFA

## (undated) DEVICE — LOGICUT SCISSOR LENGTH 320MM: Brand: LOGI - LAPAROSCOPIC INSTRUMENT SYSTEM

## (undated) DEVICE — 3M™ TEGADERM™ TRANSPARENT FILM DRESSING FRAME STYLE, 1624W, 2-3/8 IN X 2-3/4 IN (6 CM X 7 CM), 100/CT 4CT/CASE: Brand: 3M™ TEGADERM™

## (undated) DEVICE — KENDALL SCD EXPRESS SLEEVES, KNEE LENGTH, MEDIUM: Brand: KENDALL SCD

## (undated) DEVICE — APPLIER LIG CLP L13IN 10MM PSTL GRP CONTAIN 15 TI L CLP

## (undated) DEVICE — SPHINCTEROTOME: Brand: JAGTOME RX 44

## (undated) DEVICE — [HIGH FLOW INSUFFLATOR,  DO NOT USE IF PACKAGE IS DAMAGED,  KEEP DRY,  KEEP AWAY FROM SUNLIGHT,  PROTECT FROM HEAT AND RADIOACTIVE SOURCES.]: Brand: PNEUMOSURE

## (undated) DEVICE — CONTAINER SPEC FRMLN 120ML --

## (undated) DEVICE — CANNULA NSL ORAL AD FOR CAPNOFLEX CO2 O2 AIRLFE

## (undated) DEVICE — CONNECTOR TBNG OD5-7MM O2 END DISP

## (undated) DEVICE — GAUZE SPONGES,8 PLY: Brand: CURITY

## (undated) DEVICE — RETRIEVAL BALLOON CATHETER: Brand: EXTRACTOR™ PRO RX

## (undated) DEVICE — CLIP APPLIER: Brand: ENDO CLIP II

## (undated) DEVICE — SYR 10ML LUER LOK 1/5ML GRAD --

## (undated) DEVICE — GARMENT,MEDLINE,DVT,INT,CALF,MED, GEN2: Brand: MEDLINE

## (undated) DEVICE — TROCARS: Brand: KII® BLUNT TIP ACCESS SYSTEM